# Patient Record
Sex: MALE | Race: WHITE | NOT HISPANIC OR LATINO | URBAN - METROPOLITAN AREA
[De-identification: names, ages, dates, MRNs, and addresses within clinical notes are randomized per-mention and may not be internally consistent; named-entity substitution may affect disease eponyms.]

---

## 2017-01-04 ENCOUNTER — EMERGENCY (EMERGENCY)
Facility: HOSPITAL | Age: 67
LOS: 0 days | Discharge: HOME | End: 2017-01-05

## 2017-06-27 DIAGNOSIS — R04.0 EPISTAXIS: ICD-10-CM

## 2017-06-27 DIAGNOSIS — Z79.82 LONG TERM (CURRENT) USE OF ASPIRIN: ICD-10-CM

## 2017-06-27 DIAGNOSIS — Z91.040 LATEX ALLERGY STATUS: ICD-10-CM

## 2017-06-27 DIAGNOSIS — I10 ESSENTIAL (PRIMARY) HYPERTENSION: ICD-10-CM

## 2018-09-06 ENCOUNTER — APPOINTMENT (OUTPATIENT)
Dept: UROLOGY | Facility: CLINIC | Age: 68
End: 2018-09-06
Payer: COMMERCIAL

## 2018-09-06 DIAGNOSIS — Z87.891 PERSONAL HISTORY OF NICOTINE DEPENDENCE: ICD-10-CM

## 2018-09-06 DIAGNOSIS — M10.9 GOUT, UNSPECIFIED: ICD-10-CM

## 2018-09-06 DIAGNOSIS — E78.5 HYPERLIPIDEMIA, UNSPECIFIED: ICD-10-CM

## 2018-09-06 DIAGNOSIS — Z82.3 FAMILY HISTORY OF STROKE: ICD-10-CM

## 2018-09-06 DIAGNOSIS — I10 ESSENTIAL (PRIMARY) HYPERTENSION: ICD-10-CM

## 2018-09-06 DIAGNOSIS — Z78.9 OTHER SPECIFIED HEALTH STATUS: ICD-10-CM

## 2018-09-06 DIAGNOSIS — Z85.528 PERSONAL HISTORY OF OTHER MALIGNANT NEOPLASM OF KIDNEY: ICD-10-CM

## 2018-09-06 LAB
BILIRUB UR QL STRIP: NORMAL
CLARITY UR: CLEAR
COLLECTION METHOD: NORMAL
GLUCOSE UR-MCNC: NORMAL
HCG UR QL: NORMAL EU/DL
HGB UR QL STRIP.AUTO: NORMAL
KETONES UR-MCNC: NORMAL
LEUKOCYTE ESTERASE UR QL STRIP: NORMAL
NITRITE UR QL STRIP: NORMAL
PH UR STRIP: 5
PROT UR STRIP-MCNC: NORMAL
SP GR UR STRIP: 1010

## 2018-09-06 PROCEDURE — 81003 URINALYSIS AUTO W/O SCOPE: CPT | Mod: QW

## 2018-09-06 PROCEDURE — 99204 OFFICE O/P NEW MOD 45 MIN: CPT

## 2018-09-08 ENCOUNTER — OUTPATIENT (OUTPATIENT)
Dept: OUTPATIENT SERVICES | Facility: HOSPITAL | Age: 68
LOS: 1 days | Discharge: HOME | End: 2018-09-08

## 2018-09-18 ENCOUNTER — OTHER (OUTPATIENT)
Age: 68
End: 2018-09-18

## 2018-10-25 ENCOUNTER — APPOINTMENT (OUTPATIENT)
Dept: UROLOGY | Facility: CLINIC | Age: 68
End: 2018-10-25
Payer: COMMERCIAL

## 2018-10-25 ENCOUNTER — RESULT CHARGE (OUTPATIENT)
Age: 68
End: 2018-10-25

## 2018-10-25 VITALS
WEIGHT: 230 LBS | HEART RATE: 92 BPM | HEIGHT: 68 IN | SYSTOLIC BLOOD PRESSURE: 124 MMHG | BODY MASS INDEX: 34.86 KG/M2 | DIASTOLIC BLOOD PRESSURE: 77 MMHG

## 2018-10-25 LAB
BILIRUB UR QL STRIP: NORMAL
CLARITY UR: CLEAR
COLLECTION METHOD: NORMAL
GLUCOSE UR-MCNC: NORMAL
HCG UR QL: NORMAL EU/DL
HGB UR QL STRIP.AUTO: NORMAL
KETONES UR-MCNC: NORMAL
LEUKOCYTE ESTERASE UR QL STRIP: NORMAL
NITRITE UR QL STRIP: NORMAL
PH UR STRIP: 5
PROT UR STRIP-MCNC: 30
SP GR UR STRIP: 1015

## 2018-10-25 PROCEDURE — 99213 OFFICE O/P EST LOW 20 MIN: CPT

## 2018-11-01 ENCOUNTER — TRANSCRIPTION ENCOUNTER (OUTPATIENT)
Age: 68
End: 2018-11-01

## 2018-11-08 DIAGNOSIS — R89.7 ABNORMAL HISTOLOGICAL FINDINGS IN SPECIMENS FROM OTHER ORGANS, SYSTEMS AND TISSUES: ICD-10-CM

## 2019-01-18 ENCOUNTER — APPOINTMENT (OUTPATIENT)
Dept: UROLOGY | Facility: CLINIC | Age: 69
End: 2019-01-18
Payer: COMMERCIAL

## 2019-01-18 PROCEDURE — 52000 CYSTOURETHROSCOPY: CPT | Mod: 22

## 2019-02-18 ENCOUNTER — FORM ENCOUNTER (OUTPATIENT)
Age: 69
End: 2019-02-18

## 2019-02-19 ENCOUNTER — OUTPATIENT (OUTPATIENT)
Dept: OUTPATIENT SERVICES | Facility: HOSPITAL | Age: 69
LOS: 1 days | Discharge: HOME | End: 2019-02-19

## 2019-02-19 VITALS
WEIGHT: 229.28 LBS | SYSTOLIC BLOOD PRESSURE: 141 MMHG | RESPIRATION RATE: 16 BRPM | HEIGHT: 68 IN | HEART RATE: 84 BPM | DIASTOLIC BLOOD PRESSURE: 72 MMHG | OXYGEN SATURATION: 97 % | TEMPERATURE: 97 F

## 2019-02-19 DIAGNOSIS — Z01.818 ENCOUNTER FOR OTHER PREPROCEDURAL EXAMINATION: ICD-10-CM

## 2019-02-19 DIAGNOSIS — Z98.890 OTHER SPECIFIED POSTPROCEDURAL STATES: Chronic | ICD-10-CM

## 2019-02-19 DIAGNOSIS — Z90.5 ACQUIRED ABSENCE OF KIDNEY: Chronic | ICD-10-CM

## 2019-02-19 DIAGNOSIS — D41.4 NEOPLASM OF UNCERTAIN BEHAVIOR OF BLADDER: ICD-10-CM

## 2019-02-19 DIAGNOSIS — D11.9 BENIGN NEOPLASM OF MAJOR SALIVARY GLAND, UNSPECIFIED: Chronic | ICD-10-CM

## 2019-02-19 LAB
ALBUMIN SERPL ELPH-MCNC: 4.8 G/DL — SIGNIFICANT CHANGE UP (ref 3.5–5.2)
ALP SERPL-CCNC: 66 U/L — SIGNIFICANT CHANGE UP (ref 30–115)
ALT FLD-CCNC: 25 U/L — SIGNIFICANT CHANGE UP (ref 0–41)
ANION GAP SERPL CALC-SCNC: 16 MMOL/L — HIGH (ref 7–14)
APPEARANCE UR: CLEAR — SIGNIFICANT CHANGE UP
APTT BLD: 35.3 SEC — SIGNIFICANT CHANGE UP (ref 27–39.2)
AST SERPL-CCNC: 23 U/L — SIGNIFICANT CHANGE UP (ref 0–41)
BASOPHILS # BLD AUTO: 0.02 K/UL — SIGNIFICANT CHANGE UP (ref 0–0.2)
BASOPHILS NFR BLD AUTO: 0.4 % — SIGNIFICANT CHANGE UP (ref 0–1)
BILIRUB SERPL-MCNC: 0.8 MG/DL — SIGNIFICANT CHANGE UP (ref 0.2–1.2)
BILIRUB UR-MCNC: NEGATIVE — SIGNIFICANT CHANGE UP
BUN SERPL-MCNC: 21 MG/DL — HIGH (ref 10–20)
CALCIUM SERPL-MCNC: 10.5 MG/DL — HIGH (ref 8.5–10.1)
CHLORIDE SERPL-SCNC: 97 MMOL/L — LOW (ref 98–110)
CO2 SERPL-SCNC: 27 MMOL/L — SIGNIFICANT CHANGE UP (ref 17–32)
COLOR SPEC: YELLOW — SIGNIFICANT CHANGE UP
CREAT SERPL-MCNC: 0.9 MG/DL — SIGNIFICANT CHANGE UP (ref 0.7–1.5)
DIFF PNL FLD: NEGATIVE — SIGNIFICANT CHANGE UP
EOSINOPHIL # BLD AUTO: 0.08 K/UL — SIGNIFICANT CHANGE UP (ref 0–0.7)
EOSINOPHIL NFR BLD AUTO: 1.5 % — SIGNIFICANT CHANGE UP (ref 0–8)
GLUCOSE SERPL-MCNC: 72 MG/DL — SIGNIFICANT CHANGE UP (ref 70–99)
GLUCOSE UR QL: NEGATIVE MG/DL — SIGNIFICANT CHANGE UP
HCT VFR BLD CALC: 48.3 % — SIGNIFICANT CHANGE UP (ref 42–52)
HGB BLD-MCNC: 16 G/DL — SIGNIFICANT CHANGE UP (ref 14–18)
IMM GRANULOCYTES NFR BLD AUTO: 0.2 % — SIGNIFICANT CHANGE UP (ref 0.1–0.3)
INR BLD: 0.95 RATIO — SIGNIFICANT CHANGE UP (ref 0.65–1.3)
KETONES UR-MCNC: NEGATIVE — SIGNIFICANT CHANGE UP
LEUKOCYTE ESTERASE UR-ACNC: NEGATIVE — SIGNIFICANT CHANGE UP
LYMPHOCYTES # BLD AUTO: 1.08 K/UL — LOW (ref 1.2–3.4)
LYMPHOCYTES # BLD AUTO: 19.6 % — LOW (ref 20.5–51.1)
MCHC RBC-ENTMCNC: 31.3 PG — HIGH (ref 27–31)
MCHC RBC-ENTMCNC: 33.1 G/DL — SIGNIFICANT CHANGE UP (ref 32–37)
MCV RBC AUTO: 94.3 FL — HIGH (ref 80–94)
MONOCYTES # BLD AUTO: 0.58 K/UL — SIGNIFICANT CHANGE UP (ref 0.1–0.6)
MONOCYTES NFR BLD AUTO: 10.5 % — HIGH (ref 1.7–9.3)
NEUTROPHILS # BLD AUTO: 3.74 K/UL — SIGNIFICANT CHANGE UP (ref 1.4–6.5)
NEUTROPHILS NFR BLD AUTO: 67.8 % — SIGNIFICANT CHANGE UP (ref 42.2–75.2)
NITRITE UR-MCNC: NEGATIVE — SIGNIFICANT CHANGE UP
NRBC # BLD: 0 /100 WBCS — SIGNIFICANT CHANGE UP (ref 0–0)
PH UR: 5.5 — SIGNIFICANT CHANGE UP (ref 5–8)
PLATELET # BLD AUTO: 242 K/UL — SIGNIFICANT CHANGE UP (ref 130–400)
POTASSIUM SERPL-MCNC: 5 MMOL/L — SIGNIFICANT CHANGE UP (ref 3.5–5)
POTASSIUM SERPL-SCNC: 5 MMOL/L — SIGNIFICANT CHANGE UP (ref 3.5–5)
PROT SERPL-MCNC: 7.7 G/DL — SIGNIFICANT CHANGE UP (ref 6–8)
PROT UR-MCNC: 100 MG/DL
PROTHROM AB SERPL-ACNC: 10.9 SEC — SIGNIFICANT CHANGE UP (ref 9.95–12.87)
RBC # BLD: 5.12 M/UL — SIGNIFICANT CHANGE UP (ref 4.7–6.1)
RBC # FLD: 14.7 % — HIGH (ref 11.5–14.5)
SODIUM SERPL-SCNC: 140 MMOL/L — SIGNIFICANT CHANGE UP (ref 135–146)
SP GR SPEC: 1.02 — SIGNIFICANT CHANGE UP (ref 1.01–1.03)
UROBILINOGEN FLD QL: 0.2 MG/DL — SIGNIFICANT CHANGE UP (ref 0.2–0.2)
WBC # BLD: 5.51 K/UL — SIGNIFICANT CHANGE UP (ref 4.8–10.8)
WBC # FLD AUTO: 5.51 K/UL — SIGNIFICANT CHANGE UP (ref 4.8–10.8)
WBC UR QL: SIGNIFICANT CHANGE UP /HPF

## 2019-02-19 NOTE — H&P PST ADULT - REASON FOR ADMISSION
69 yo male presents with hx "malignant tumor in my left kidney& I had part of it removed, I go for routine f/u& I have polyps in my bladder", pt is scheduled fo biopsy, turbt  denies chest pain, palpitations, shortness of breath, dyspnea, or dysuria. exercise tolerance: 2 blocks/ flights of stairs w/o sob

## 2019-02-19 NOTE — H&P PST ADULT - PMH
Back pain    BPH (benign prostatic hyperplasia)    GERD (gastroesophageal reflux disease)    Gout    HTN (hypertension)    Renal cancer, left

## 2019-02-20 LAB
CULTURE RESULTS: NO GROWTH — SIGNIFICANT CHANGE UP
SPECIMEN SOURCE: SIGNIFICANT CHANGE UP

## 2019-03-01 ENCOUNTER — TRANSCRIPTION ENCOUNTER (OUTPATIENT)
Age: 69
End: 2019-03-01

## 2019-03-04 ENCOUNTER — RESULT REVIEW (OUTPATIENT)
Age: 69
End: 2019-03-04

## 2019-03-04 ENCOUNTER — APPOINTMENT (OUTPATIENT)
Dept: UROLOGY | Facility: HOSPITAL | Age: 69
End: 2019-03-04
Payer: COMMERCIAL

## 2019-03-04 ENCOUNTER — OUTPATIENT (OUTPATIENT)
Dept: OUTPATIENT SERVICES | Facility: HOSPITAL | Age: 69
LOS: 1 days | Discharge: HOME | End: 2019-03-04

## 2019-03-04 VITALS
HEART RATE: 105 BPM | HEIGHT: 68 IN | OXYGEN SATURATION: 100 % | RESPIRATION RATE: 16 BRPM | DIASTOLIC BLOOD PRESSURE: 84 MMHG | SYSTOLIC BLOOD PRESSURE: 151 MMHG | WEIGHT: 225.09 LBS | TEMPERATURE: 98 F

## 2019-03-04 VITALS — DIASTOLIC BLOOD PRESSURE: 60 MMHG | RESPIRATION RATE: 18 BRPM | SYSTOLIC BLOOD PRESSURE: 110 MMHG | HEART RATE: 90 BPM

## 2019-03-04 DIAGNOSIS — Z90.5 ACQUIRED ABSENCE OF KIDNEY: Chronic | ICD-10-CM

## 2019-03-04 DIAGNOSIS — Z98.890 OTHER SPECIFIED POSTPROCEDURAL STATES: Chronic | ICD-10-CM

## 2019-03-04 DIAGNOSIS — D11.9 BENIGN NEOPLASM OF MAJOR SALIVARY GLAND, UNSPECIFIED: Chronic | ICD-10-CM

## 2019-03-04 PROBLEM — K21.9 GASTRO-ESOPHAGEAL REFLUX DISEASE WITHOUT ESOPHAGITIS: Chronic | Status: ACTIVE | Noted: 2019-02-19

## 2019-03-04 PROBLEM — I10 ESSENTIAL (PRIMARY) HYPERTENSION: Chronic | Status: ACTIVE | Noted: 2019-02-19

## 2019-03-04 PROBLEM — M54.9 DORSALGIA, UNSPECIFIED: Chronic | Status: ACTIVE | Noted: 2019-02-19

## 2019-03-04 PROBLEM — M10.9 GOUT, UNSPECIFIED: Chronic | Status: ACTIVE | Noted: 2019-02-19

## 2019-03-04 PROCEDURE — 52234 CYSTOSCOPY AND TREATMENT: CPT

## 2019-03-04 PROCEDURE — 52630 REMOVE PROSTATE REGROWTH: CPT

## 2019-03-04 RX ORDER — ACETAMINOPHEN 500 MG
650 TABLET ORAL ONCE
Qty: 0 | Refills: 0 | Status: DISCONTINUED | OUTPATIENT
Start: 2019-03-04 | End: 2019-03-19

## 2019-03-04 RX ORDER — HYDROMORPHONE HYDROCHLORIDE 2 MG/ML
0.5 INJECTION INTRAMUSCULAR; INTRAVENOUS; SUBCUTANEOUS
Qty: 0 | Refills: 0 | Status: DISCONTINUED | OUTPATIENT
Start: 2019-03-04 | End: 2019-03-04

## 2019-03-04 RX ORDER — KETOROLAC TROMETHAMINE 30 MG/ML
30 SYRINGE (ML) INJECTION ONCE
Qty: 0 | Refills: 0 | Status: DISCONTINUED | OUTPATIENT
Start: 2019-03-04 | End: 2019-03-19

## 2019-03-04 RX ORDER — ONDANSETRON 8 MG/1
4 TABLET, FILM COATED ORAL ONCE
Qty: 0 | Refills: 0 | Status: DISCONTINUED | OUTPATIENT
Start: 2019-03-04 | End: 2019-03-19

## 2019-03-04 RX ORDER — SODIUM CHLORIDE 9 MG/ML
1000 INJECTION, SOLUTION INTRAVENOUS
Qty: 0 | Refills: 0 | Status: DISCONTINUED | OUTPATIENT
Start: 2019-03-04 | End: 2019-03-19

## 2019-03-04 RX ORDER — PHENAZOPYRIDINE HCL 100 MG
200 TABLET ORAL ONCE
Qty: 0 | Refills: 0 | Status: DISCONTINUED | OUTPATIENT
Start: 2019-03-04 | End: 2019-03-19

## 2019-03-04 RX ORDER — OXYCODONE AND ACETAMINOPHEN 5; 325 MG/1; MG/1
1 TABLET ORAL ONCE
Qty: 0 | Refills: 0 | Status: DISCONTINUED | OUTPATIENT
Start: 2019-03-04 | End: 2019-03-04

## 2019-03-04 NOTE — ASU DISCHARGE PLAN (ADULT/PEDIATRIC). - SPECIAL INSTRUCTIONS
expect some blood in urine   expect some pain   cath to leg bag   finish ABT   call now for appt tomorrow to remove cath

## 2019-03-04 NOTE — BRIEF OPERATIVE NOTE - PROCEDURE
<<-----Click on this checkbox to enter Procedure Cystoscopy  03/04/2019    Active  CHET  TURBT, using bipolar cautery  03/04/2019    Active  CHET  TURP, using bipolar cautery probe  03/04/2019    Active  CHET  Prostate biopsy, transurethral  03/04/2019  cold cup  Active  CHET

## 2019-03-05 ENCOUNTER — APPOINTMENT (OUTPATIENT)
Dept: UROLOGY | Facility: CLINIC | Age: 69
End: 2019-03-05
Payer: COMMERCIAL

## 2019-03-05 DIAGNOSIS — R31.9 HEMATURIA, UNSPECIFIED: ICD-10-CM

## 2019-03-05 LAB — SURGICAL PATHOLOGY STUDY: SIGNIFICANT CHANGE UP

## 2019-03-05 PROCEDURE — 99213 OFFICE O/P EST LOW 20 MIN: CPT

## 2019-03-05 NOTE — HISTORY OF PRESENT ILLNESS
[Urinary Retention] : urinary retention [FreeTextEntry1] : 68 y.o male s/p TURBT/TURP/TU prostate biopsy yesterday 3/4/19\par here for catheter removal\par doing well\par no complaints\par appetite good\par denies fevers

## 2019-03-05 NOTE — PHYSICAL EXAM
[General Appearance - Well Developed] : well developed [General Appearance - Well Nourished] : well nourished [Normal Appearance] : normal appearance [Well Groomed] : well groomed [General Appearance - In No Acute Distress] : no acute distress [Abdomen Soft] : soft [Abdomen Tenderness] : non-tender [Costovertebral Angle Tenderness] : no ~M costovertebral angle tenderness [Urethral Meatus] : meatus normal [Scrotum] : the scrotum was normal [Testes Mass (___cm)] : there were no testicular masses [Edema] : no peripheral edema [] : no respiratory distress [Respiration, Rhythm And Depth] : normal respiratory rhythm and effort [Exaggerated Use Of Accessory Muscles For Inspiration] : no accessory muscle use [Oriented To Time, Place, And Person] : oriented to person, place, and time [Affect] : the affect was normal [Mood] : the mood was normal [Not Anxious] : not anxious [Normal Station and Gait] : the gait and station were normal for the patient's age [No Focal Deficits] : no focal deficits [No Palpable Adenopathy] : no palpable adenopathy [FreeTextEntry1] : landon catheter to leg nag with clear urine removed without difficulty- pt tolerated well

## 2019-03-05 NOTE — ASSESSMENT
[FreeTextEntry1] : 1. s/p TURBT/TURP/TU prostate biopsy yesterday 3/4/19\par \par increase liquid intake- if no u/o in 6-8 hrs call office or go to the ER\par f/u 7-10 days for pathology discussion

## 2019-03-08 DIAGNOSIS — M10.9 GOUT, UNSPECIFIED: ICD-10-CM

## 2019-03-08 DIAGNOSIS — N40.0 BENIGN PROSTATIC HYPERPLASIA WITHOUT LOWER URINARY TRACT SYMPTOMS: ICD-10-CM

## 2019-03-08 DIAGNOSIS — M54.9 DORSALGIA, UNSPECIFIED: ICD-10-CM

## 2019-03-08 DIAGNOSIS — Z87.891 PERSONAL HISTORY OF NICOTINE DEPENDENCE: ICD-10-CM

## 2019-03-08 DIAGNOSIS — N34.2 OTHER URETHRITIS: ICD-10-CM

## 2019-03-08 DIAGNOSIS — I10 ESSENTIAL (PRIMARY) HYPERTENSION: ICD-10-CM

## 2019-03-08 DIAGNOSIS — Z85.528 PERSONAL HISTORY OF OTHER MALIGNANT NEOPLASM OF KIDNEY: ICD-10-CM

## 2019-03-08 DIAGNOSIS — Z90.5 ACQUIRED ABSENCE OF KIDNEY: ICD-10-CM

## 2019-03-08 DIAGNOSIS — K21.9 GASTRO-ESOPHAGEAL REFLUX DISEASE WITHOUT ESOPHAGITIS: ICD-10-CM

## 2019-03-08 DIAGNOSIS — D41.4 NEOPLASM OF UNCERTAIN BEHAVIOR OF BLADDER: ICD-10-CM

## 2019-03-20 ENCOUNTER — APPOINTMENT (OUTPATIENT)
Dept: UROLOGY | Facility: CLINIC | Age: 69
End: 2019-03-20
Payer: COMMERCIAL

## 2019-03-20 VITALS — WEIGHT: 230 LBS | HEIGHT: 68 IN | BODY MASS INDEX: 34.86 KG/M2

## 2019-03-20 VITALS — DIASTOLIC BLOOD PRESSURE: 74 MMHG | SYSTOLIC BLOOD PRESSURE: 137 MMHG | HEART RATE: 90 BPM

## 2019-03-20 PROCEDURE — 99213 OFFICE O/P EST LOW 20 MIN: CPT | Mod: 24

## 2019-04-09 ENCOUNTER — APPOINTMENT (OUTPATIENT)
Dept: UROLOGY | Facility: CLINIC | Age: 69
End: 2019-04-09

## 2020-03-24 ENCOUNTER — APPOINTMENT (OUTPATIENT)
Dept: UROLOGY | Facility: CLINIC | Age: 70
End: 2020-03-24

## 2020-07-13 ENCOUNTER — INPATIENT (INPATIENT)
Facility: HOSPITAL | Age: 70
LOS: 7 days | Discharge: HOME HEALTH | End: 2020-07-21
Attending: THORACIC SURGERY (CARDIOTHORACIC VASCULAR SURGERY) | Admitting: THORACIC SURGERY (CARDIOTHORACIC VASCULAR SURGERY)
Payer: MEDICARE

## 2020-07-13 VITALS
TEMPERATURE: 99 F | RESPIRATION RATE: 18 BRPM | WEIGHT: 240.97 LBS | OXYGEN SATURATION: 96 % | DIASTOLIC BLOOD PRESSURE: 94 MMHG | SYSTOLIC BLOOD PRESSURE: 149 MMHG | HEART RATE: 118 BPM

## 2020-07-13 DIAGNOSIS — Z98.890 OTHER SPECIFIED POSTPROCEDURAL STATES: Chronic | ICD-10-CM

## 2020-07-13 DIAGNOSIS — D11.9 BENIGN NEOPLASM OF MAJOR SALIVARY GLAND, UNSPECIFIED: Chronic | ICD-10-CM

## 2020-07-13 DIAGNOSIS — Z90.5 ACQUIRED ABSENCE OF KIDNEY: Chronic | ICD-10-CM

## 2020-07-13 DIAGNOSIS — D41.4 NEOPLASM OF UNCERTAIN BEHAVIOR OF BLADDER: Chronic | ICD-10-CM

## 2020-07-13 DIAGNOSIS — Z98.1 ARTHRODESIS STATUS: Chronic | ICD-10-CM

## 2020-07-13 LAB
ALBUMIN SERPL ELPH-MCNC: 4.4 G/DL — SIGNIFICANT CHANGE UP (ref 3.5–5.2)
ALP SERPL-CCNC: 60 U/L — SIGNIFICANT CHANGE UP (ref 30–115)
ALT FLD-CCNC: 24 U/L — SIGNIFICANT CHANGE UP (ref 0–41)
ANION GAP SERPL CALC-SCNC: 13 MMOL/L — SIGNIFICANT CHANGE UP (ref 7–14)
AST SERPL-CCNC: 22 U/L — SIGNIFICANT CHANGE UP (ref 0–41)
BILIRUB SERPL-MCNC: 0.6 MG/DL — SIGNIFICANT CHANGE UP (ref 0.2–1.2)
BUN SERPL-MCNC: 22 MG/DL — HIGH (ref 10–20)
CALCIUM SERPL-MCNC: 10.2 MG/DL — HIGH (ref 8.5–10.1)
CHLORIDE SERPL-SCNC: 102 MMOL/L — SIGNIFICANT CHANGE UP (ref 98–110)
CO2 SERPL-SCNC: 23 MMOL/L — SIGNIFICANT CHANGE UP (ref 17–32)
CREAT SERPL-MCNC: 0.9 MG/DL — SIGNIFICANT CHANGE UP (ref 0.7–1.5)
GLUCOSE SERPL-MCNC: 146 MG/DL — HIGH (ref 70–99)
HCT VFR BLD CALC: 48.2 % — SIGNIFICANT CHANGE UP (ref 42–52)
HGB BLD-MCNC: 15.7 G/DL — SIGNIFICANT CHANGE UP (ref 14–18)
INR BLD: 0.93 RATIO — SIGNIFICANT CHANGE UP (ref 0.65–1.3)
MCHC RBC-ENTMCNC: 31 PG — SIGNIFICANT CHANGE UP (ref 27–31)
MCHC RBC-ENTMCNC: 32.6 G/DL — SIGNIFICANT CHANGE UP (ref 32–37)
MCV RBC AUTO: 95.3 FL — HIGH (ref 80–94)
NRBC # BLD: 0 /100 WBCS — SIGNIFICANT CHANGE UP (ref 0–0)
NT-PROBNP SERPL-SCNC: 34 PG/ML — SIGNIFICANT CHANGE UP (ref 0–300)
PLATELET # BLD AUTO: 195 K/UL — SIGNIFICANT CHANGE UP (ref 130–400)
POTASSIUM SERPL-MCNC: 4.3 MMOL/L — SIGNIFICANT CHANGE UP (ref 3.5–5)
POTASSIUM SERPL-SCNC: 4.3 MMOL/L — SIGNIFICANT CHANGE UP (ref 3.5–5)
PROT SERPL-MCNC: 7.4 G/DL — SIGNIFICANT CHANGE UP (ref 6–8)
PROTHROM AB SERPL-ACNC: 10.7 SEC — SIGNIFICANT CHANGE UP (ref 9.95–12.87)
RBC # BLD: 5.06 M/UL — SIGNIFICANT CHANGE UP (ref 4.7–6.1)
RBC # FLD: 14.5 % — SIGNIFICANT CHANGE UP (ref 11.5–14.5)
SODIUM SERPL-SCNC: 138 MMOL/L — SIGNIFICANT CHANGE UP (ref 135–146)
TROPONIN T SERPL-MCNC: <0.01 NG/ML — SIGNIFICANT CHANGE UP
WBC # BLD: 8.71 K/UL — SIGNIFICANT CHANGE UP (ref 4.8–10.8)
WBC # FLD AUTO: 8.71 K/UL — SIGNIFICANT CHANGE UP (ref 4.8–10.8)

## 2020-07-13 PROCEDURE — 93010 ELECTROCARDIOGRAM REPORT: CPT | Mod: 76

## 2020-07-13 PROCEDURE — 99285 EMERGENCY DEPT VISIT HI MDM: CPT | Mod: CS

## 2020-07-13 PROCEDURE — 71046 X-RAY EXAM CHEST 2 VIEWS: CPT | Mod: 26

## 2020-07-13 RX ORDER — ASPIRIN/CALCIUM CARB/MAGNESIUM 324 MG
162 TABLET ORAL ONCE
Refills: 0 | Status: COMPLETED | OUTPATIENT
Start: 2020-07-13 | End: 2020-07-13

## 2020-07-13 RX ORDER — SODIUM CHLORIDE 9 MG/ML
350 INJECTION INTRAMUSCULAR; INTRAVENOUS; SUBCUTANEOUS ONCE
Refills: 0 | Status: DISCONTINUED | OUTPATIENT
Start: 2020-07-14 | End: 2020-07-14

## 2020-07-13 RX ORDER — OMEGA-3 ACID ETHYL ESTERS 1 G
2 CAPSULE ORAL
Refills: 0 | Status: DISCONTINUED | OUTPATIENT
Start: 2020-07-13 | End: 2020-07-14

## 2020-07-13 RX ORDER — SENNA PLUS 8.6 MG/1
2 TABLET ORAL AT BEDTIME
Refills: 0 | Status: DISCONTINUED | OUTPATIENT
Start: 2020-07-13 | End: 2020-07-15

## 2020-07-13 RX ORDER — PANTOPRAZOLE SODIUM 20 MG/1
40 TABLET, DELAYED RELEASE ORAL
Refills: 0 | Status: DISCONTINUED | OUTPATIENT
Start: 2020-07-13 | End: 2020-07-15

## 2020-07-13 RX ORDER — ALLOPURINOL 300 MG
300 TABLET ORAL DAILY
Refills: 0 | Status: DISCONTINUED | OUTPATIENT
Start: 2020-07-13 | End: 2020-07-15

## 2020-07-13 RX ORDER — ENOXAPARIN SODIUM 100 MG/ML
40 INJECTION SUBCUTANEOUS AT BEDTIME
Refills: 0 | Status: DISCONTINUED | OUTPATIENT
Start: 2020-07-13 | End: 2020-07-14

## 2020-07-13 RX ORDER — HYDROCHLOROTHIAZIDE 25 MG
12.5 TABLET ORAL DAILY
Refills: 0 | Status: DISCONTINUED | OUTPATIENT
Start: 2020-07-13 | End: 2020-07-14

## 2020-07-13 RX ORDER — ASPIRIN/CALCIUM CARB/MAGNESIUM 324 MG
1 TABLET ORAL
Qty: 0 | Refills: 0 | DISCHARGE

## 2020-07-13 RX ORDER — OMEPRAZOLE 10 MG/1
1 CAPSULE, DELAYED RELEASE ORAL
Qty: 0 | Refills: 0 | DISCHARGE

## 2020-07-13 RX ORDER — LOSARTAN POTASSIUM 100 MG/1
50 TABLET, FILM COATED ORAL DAILY
Refills: 0 | Status: DISCONTINUED | OUTPATIENT
Start: 2020-07-13 | End: 2020-07-14

## 2020-07-13 RX ORDER — SODIUM CHLORIDE 9 MG/ML
500 INJECTION, SOLUTION INTRAVENOUS
Refills: 0 | Status: DISCONTINUED | OUTPATIENT
Start: 2020-07-14 | End: 2020-07-14

## 2020-07-13 RX ORDER — ACETAMINOPHEN 500 MG
2 TABLET ORAL
Qty: 0 | Refills: 0 | DISCHARGE

## 2020-07-13 RX ORDER — ASPIRIN/CALCIUM CARB/MAGNESIUM 324 MG
81 TABLET ORAL DAILY
Refills: 0 | Status: DISCONTINUED | OUTPATIENT
Start: 2020-07-13 | End: 2020-07-15

## 2020-07-13 RX ORDER — CHLORHEXIDINE GLUCONATE 213 G/1000ML
1 SOLUTION TOPICAL
Refills: 0 | Status: DISCONTINUED | OUTPATIENT
Start: 2020-07-13 | End: 2020-07-15

## 2020-07-13 RX ADMIN — Medication 162 MILLIGRAM(S): at 11:32

## 2020-07-13 NOTE — ED ADULT TRIAGE NOTE - CHIEF COMPLAINT QUOTE
Patient c/o left sided CP that started this morning after eating and radiates down left arm. Denies SOB and states pain has subsided

## 2020-07-13 NOTE — H&P ADULT - NSICDXPASTMEDICALHX_GEN_ALL_CORE_FT
PAST MEDICAL HISTORY:  Back pain     BPH (benign prostatic hyperplasia)     GERD (gastroesophageal reflux disease)     Gout     HTN (hypertension)     Renal cancer, left Renal cell carcinoma s/p partial left nephrectomy (20% removed)

## 2020-07-13 NOTE — H&P ADULT - NSHPPHYSICALEXAM_GEN_ALL_CORE
CONSTITUTIONAL: No acute distress, obese, well-groomed, AAOx3  HEAD: Atraumatic, normocephalic  EYES: EOM intact, PERRLA, conjunctiva and sclera clear  ENT: Supple, no masses, no thyromegaly, no bruits, no JVD; moist mucous membranes  PULMONARY: Clear to auscultation bilaterally; no wheezes, rales, or rhonchi  CARDIOVASCULAR: Sinus tachycardia; no murmurs, rubs, or gallops  GASTROINTESTINAL: Soft, non-tender, distended; bowel sounds present  MUSCULOSKELETAL: 2+ peripheral pulses; no clubbing, no cyanosis, no edema  NEUROLOGY: non-focal  SKIN: No rashes or lesions; warm and dry

## 2020-07-13 NOTE — H&P ADULT - NSHPLABSRESULTS_GEN_ALL_CORE
15.7   8.71  )-----------( 195      ( 13 Jul 2020 11:20 )             48.2     07-13    138  |  102  |  22<H>  ----------------------------<  146<H>  4.3   |  23  |  0.9    Ca    10.2<H>      13 Jul 2020 11:20    TPro  7.4  /  Alb  4.4  /  TBili  0.6  /  DBili  x   /  AST  22  /  ALT  24  /  AlkPhos  60  07-13    PT/INR - ( 13 Jul 2020 11:42 )   PT: 10.70 sec;   INR: 0.93 ratio         Troponin T, Serum: <0.01 ng/mL (07-13-20 @ 11:20)    CARDIAC MARKERS ( 13 Jul 2020 11:20 )  x     / <0.01 ng/mL / x     / x     / x        < from: 12 Lead ECG (07.13.20 @ 11:37) >    Diagnosis Line Sinus tachycardia  Low voltage QRS  Left anterior fascicular block  Inferior infarct , age undetermined  Possible Anterolateral infarct , age undetermined  Abnormal ECG  < end of copied text >

## 2020-07-13 NOTE — ED PROVIDER NOTE - NS ED MD TWO NIGHTS YN
Yes Consent 2/Introductory Paragraph: Mohs surgery was explained to the patient and consent was obtained. The risks, benefits and alternatives to therapy were discussed in detail. Specifically, the risks of infection, scarring, bleeding, prolonged wound healing, incomplete removal, allergy to anesthesia, nerve injury and recurrence were addressed. Prior to the procedure, the treatment site was clearly identified and confirmed by the patient using mirror when possible. All components of Universal Protocol/PAUSE Rule completed.

## 2020-07-13 NOTE — H&P ADULT - NSHPOUTPATIENTPROVIDERS_GEN_ALL_CORE
PCP: Dr. Vinita Farrell  Cardiologist: Dr. Sree Fernandes  Rheumatologist: Dr. Constantino Diamond  Nephrologist: Dr. Sylvia Arce

## 2020-07-13 NOTE — ED ADULT NURSE NOTE - OBJECTIVE STATEMENT
Pt states after eating breakfast he had left back/shoulder pain that radiated down his arm associated with left side chest tightness and mild dizziness.

## 2020-07-13 NOTE — CHART NOTE - NSCHARTNOTEFT_GEN_A_CORE
Pre cath note:    indication:  [ ] STEMI                [ ] NSTEMI                 [ ] Acute coronary syndrome                     [ ]Unstable Angina   [ ] high risk  [ ] intermediate risk  [ ] low risk                     [ ] Stable Angina     non-invasive testing:  abdnormal stress test                        Date:                     result: [ ] high risk  [ ] intermediate risk  [ ] low risk    Anti- Anginal medications:                    [x] not used                       [ ] used                   [ ] not used but strong indication not to use    Ejection Fraction                   [ ] <29            [ ] 30-39%   [ ] 40-49%     [ ]>50%    CHF                   [ ] active (within last 14 days on meds   [ ] Chronic (on meds but no exacerbation)    COPD                   [ ] mild (on chronic bronchodilators)  [ ] moderate (on chronic steroid therapy)      [ ] severe (indication for home O2 or PACO2 >50)    Other risk factors:                       [ ] Previous MI                     [ ] CVA/ stroke                    [ ] carotid stent/ CEA                    [ ] PVD/PAD- (arterial aneurysm, non-palpable pulses, tortuous vessel with inability to insert catheter, infra-renal dissection, renal or subclavian artery stenosis)                    [ ] diabetic                    [ ] previous CABG                    [ ] Renal Failure                           15.7   8.71  )-----------( 195      ( 13 Jul 2020 11:20 )             48.2     07-13    138  |  102  |  22<H>  ----------------------------<  146<H>  4.3   |  23  |  0.9    Ca    10.2<H>      13 Jul 2020 11:20    TPro  7.4  /  Alb  4.4  /  TBili  0.6  /  DBili  x   /  AST  22  /  ALT  24  /  AlkPhos  60  07-13                         -Patient Schedule for LHC/PCI tomorrow at :   -Keep NPO after midnight  -Hold Anticoagulation prior to PCI: Hold Lovenox for DVT prophylaxis on day of cardiac cath  -Start IV Fluids NS at : patient already on IV fluids Pre cath note:    indication:  [ ] STEMI                [ ] NSTEMI                 [ ] Acute coronary syndrome                     [ ]Unstable Angina   [ ] high risk  [ ] intermediate risk  [ ] low risk                     [x] Stable Angina     non-invasive testing:  abnormal stress test                        Date:                     result: [ ] high risk  [ ] intermediate risk  [ ] low risk    Anti- Anginal medications:                    [x] not used                       [ ] used                   [ ] not used but strong indication not to use    Ejection Fraction                   [ ] <29            [ ] 30-39%   [ ] 40-49%     [ ]>50%    CHF                   [ ] active (within last 14 days on meds   [ ] Chronic (on meds but no exacerbation)    COPD                   [ ] mild (on chronic bronchodilators)  [ ] moderate (on chronic steroid therapy)      [ ] severe (indication for home O2 or PACO2 >50)    Other risk factors:                       [ ] Previous MI                     [ ] CVA/ stroke                    [ ] carotid stent/ CEA                    [ ] PVD/PAD- (arterial aneurysm, non-palpable pulses, tortuous vessel with inability to insert catheter, infra-renal dissection, renal or subclavian artery stenosis)                    [ ] diabetic                    [ ] previous CABG                    [ ] Renal Failure                           15.7   8.71  )-----------( 195      ( 13 Jul 2020 11:20 )             48.2     07-13    138  |  102  |  22<H>  ----------------------------<  146<H>  4.3   |  23  |  0.9    Ca    10.2<H>      13 Jul 2020 11:20    TPro  7.4  /  Alb  4.4  /  TBili  0.6  /  DBili  x   /  AST  22  /  ALT  24  /  AlkPhos  60  07-13                         -Patient Schedule for LHC/PCI tomorrow at :   -Keep NPO after midnight  -Hold Anticoagulation prior to PCI: Hold Lovenox for DVT prophylaxis on day of cardiac cath  -Start IV Fluids NS at : 350cc to give 1 hour prior to cardiac cath Pre cath note:    indication:  [ ] STEMI                [ ] NSTEMI                 [ ] Acute coronary syndrome                     [ ]Unstable Angina   [ ] high risk  [ ] intermediate risk  [ ] low risk                     [x] Stable Angina     non-invasive testing:  abnormal stress test                        Date:                     result: [ ] high risk  [ ] intermediate risk  [ ] low risk    Anti- Anginal medications:                    [x] not used                       [ ] used                   [ ] not used but strong indication not to use    Ejection Fraction                   [ ] <29            [ ] 30-39%   [ ] 40-49%     [ ]>50%    CHF                   [ ] active (within last 14 days on meds   [ ] Chronic (on meds but no exacerbation)    COPD                   [ ] mild (on chronic bronchodilators)  [ ] moderate (on chronic steroid therapy)      [ ] severe (indication for home O2 or PACO2 >50)    Other risk factors:                       [ ] Previous MI                     [ ] CVA/ stroke                    [ ] carotid stent/ CEA                    [ ] PVD/PAD- (arterial aneurysm, non-palpable pulses, tortuous vessel with inability to insert catheter, infra-renal dissection, renal or subclavian artery stenosis)                    [ ] diabetic                    [ ] previous CABG                    [ ] Renal Failure                           15.7   8.71  )-----------( 195      ( 13 Jul 2020 11:20 )             48.2     07-13    138  |  102  |  22<H>  ----------------------------<  146<H>  4.3   |  23  |  0.9    Ca    10.2<H>      13 Jul 2020 11:20    TPro  7.4  /  Alb  4.4  /  TBili  0.6  /  DBili  x   /  AST  22  /  ALT  24  /  AlkPhos  60  07-13                         -Patient Schedule for LHC/PCI tomorrow at :   -Keep NPO after midnight  -Hold Anticoagulation prior to PCI: Hold Lovenox for DVT prophylaxis on day of cardiac cath  -Start IV Fluids NS at : 350cc to give 1 hour prior to cardiac cath    agree

## 2020-07-13 NOTE — H&P ADULT - NSHPREVIEWOFSYSTEMS_GEN_ALL_CORE
CONSTITUTIONAL: No weakness, fevers or chills; No headaches  EYES: No visual changes, eye pain, or discharge  ENT: No vertigo; No ear pain or change in hearing; No sore throat or difficulty swallowing  NECK: No pain or stiffness  RESPIRATORY: No cough, wheezing, or hemoptysis; No shortness of breath  CARDIOVASCULAR: (+) chest pain, no palpitations  GASTROINTESTINAL: No abdominal or epigastric pain; (+) nausea (now resolved), no vomiting or hematemesis; No diarrhea or constipation; No melena or hematochezia  GENITOURINARY: No dysuria, frequency or hematuria  MUSCULOSKELETAL: No joint pain, no muscle pain, no weakness  NEUROLOGICAL: No numbness or weakness  SKIN: No itching or rashes

## 2020-07-13 NOTE — ED PROVIDER NOTE - CLINICAL SUMMARY MEDICAL DECISION MAKING FREE TEXT BOX
evaluated for chest pain, symptoms are concerning for possible unstable angina. we spoke to cardiology and a plan is set for catherization for further evaluation, lab work and cxr were obtained with lab work showing no signs of NSTEMI.
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
## 2020-07-13 NOTE — ED PROVIDER NOTE - NS ED ROS FT
CONST: No fever, chills or bodyaches  EYES: No pain, redness, drainage or visual changes.  ENT: No ear pain or discharge, nasal discharge or congestion. No sore throat  CARD: see HPI  RESP: No SOB, cough  GI: No abdominal pain, N/V/D  MS: No joint pain, back pain or extremity pain/injury  SKIN: No rashes  NEURO: No headache, dizziness, paresthesias or LOC

## 2020-07-13 NOTE — ED PROVIDER NOTE - PROGRESS NOTE DETAILS
Attending Note:   70 yo M with plan for catheterization next month for heart due to recurrent CP, today he has been having CP described as chest tightness. L chest into L shoulder and arm without any associated sx. Now sx have resolved. On exam: PT in NAD. Cardiac- S1S2. Lungs- CTAB. Abdomen soft NTND. Extremities- No LE edema. Neuro- grossly intact. Plan: Cardiac workup with troponin, EKG, CXR and cardio consult Discussed with Dr. Gomez, pts cardiologist, will discuss with Dr. Woodruff for potential cath earlier

## 2020-07-13 NOTE — ED PROVIDER NOTE - ATTENDING CONTRIBUTION TO CARE
Attending Note:   68 yo M with plan for upcoming catheterizationr due to abn stress test, today he has been having CP described as chest tightness. L chest into L shoulder and arm without any associated sx. Now sx have resolved. On exam: PT in NAD. Cardiac- S1S2. Lungs- CTAB. Abdomen soft NTND. Extremities- No LE edema. Neuro- grossly intact. Plan: Cardiac workup with troponin, EKG, CXR and cardio consult.

## 2020-07-13 NOTE — H&P ADULT - ATTENDING COMMENTS
patient seen and examined independently   agree with above note with the following additions   chets xray which I reviewed shows no acute changes   EKG which I reviewed shows sinus tachy 113     Chest pain:   ruled out for MI  Cath today   asa, lipitor started. start lopressor 50 q12h, on losartan     HTN: uncontrolled. c/w HCTZ, added lopressor, losartan     HLD added statin     mild hypercalcemia 10.2 OPT follow up

## 2020-07-13 NOTE — ED PROVIDER NOTE - PHYSICAL EXAMINATION
CONST: Well appearing in NAD  EYES: PERRL, EOMI, Sclera and conjunctiva clear.   ENT: No nasal discharge.  NECK: Non-tender  CARD: Normal S1 S2; Normal rate and rhythm  RESP: Equal BS B/L, No wheezes, rhonchi or rales. No distress  GI: Soft, non-tender, non-distended.  MS: Normal ROM in all extremities. No midline spinal tenderness.  SKIN: Warm, dry, no acute rashes. Good turgor  NEURO: A&Ox3, No focal deficits. Strength 5/5 with no sensory deficits.

## 2020-07-13 NOTE — H&P ADULT - ASSESSMENT
Patient is a 70yo male with PMH of hypertension, chronic back pain, BPH, GERD, gout, and renal cell carcinoma s/p partial left nephrectomy (9/17/2009) who presented to the ED for acute left-sided chest pain.    #Atypical chest pain vs unstable angina  - Chest pain is not provoked by exertion (occurred at rest) or relieved by rest or nitro  - Abnormal echocardiogram and stress test outpatient (will need to obtain records)  - Patient states chest pain is 1/10 on admission  - Troponin negative x1  - ECG on admission: sinus tachycardia, left anterior fascicular block (new compared to previous ECG)  - Admit to telemetry  - NPO after midnight  - Follow HbA1c and lipid profile in AM  - Pending cardiology consult    #Hypercalcemia  - Calcium on admission: 10.2 (borderline high)  - Patient currently asymptomatic  - Follow repeat calcium in AM    #Hypertension  - Continue with hydrochlorothiazide 12.5mg PO QD  - Convert valsartan 160mg to losartan 50mg PO QD    #GERD  - Convert outpatient omeprazole to pantoprazole 40mg PO QD    #Gout  - Continue with allopurinol 300mg PO QD    #Renal cell carcinoma s/p partial left nephrectomy (9/17/2009)  - Creatinine on admission: 0.9  - Estimated GFR: 87  - Follow outpatient with nephrology (Dr. Sylvia Arce)    #Chronic constipation  - Convert colace to senna 2 tabs PO QHS    #Suspected vitamin deficiencies  - Continue with multivitamin 1 tab PO QD, Lovaza 2g PO BID    #Misc  - DVT Prophylaxis: Lovenox 40mg SQ QHS  - GI Prophylaxis: pantoprazole 40mg PO QD   - Diet: DASH/TLC  - Activity: ambulate as tolerated  - IV Fluids: Start LR at 75mL/hr while NPO  - Code Status: Full Code    Dispo: admit to telemetry, NPO after midnight for cardiac catheterization with Dr. Fernandes Patient is a 70yo male with PMH of hypertension, chronic back pain, BPH, GERD, gout, and renal cell carcinoma s/p partial left nephrectomy (9/17/2009) who presented to the ED for acute left-sided chest pain.    #Atypical chest pain  - Chest pain is not provoked by exertion (occurred at rest) or relieved by rest or nitro  - Abnormal echocardiogram and stress test outpatient (will need to obtain records)  - Patient states chest pain is 1/10 on admission  - Troponin negative x1  - ECG on admission: sinus tachycardia, left anterior fascicular block (new compared to previous ECG)  - Admit to telemetry  - NPO after midnight  - Follow HbA1c and lipid profile in AM  - Pending cardiology consult    #Hypercalcemia  - Calcium on admission: 10.2 (borderline high)  - Patient currently asymptomatic  - Follow repeat calcium in AM    #Hypertension  - Continue with hydrochlorothiazide 12.5mg PO QD  - Convert valsartan 160mg to losartan 50mg PO QD    #GERD  - Convert outpatient omeprazole to pantoprazole 40mg PO QD    #Gout  - Continue with allopurinol 300mg PO QD    #Renal cell carcinoma s/p partial left nephrectomy (9/17/2009)  - Creatinine on admission: 0.9  - Estimated GFR: 87  - Follow outpatient with nephrology (Dr. Sylvia Arce)    #Chronic constipation  - Convert colace to senna 2 tabs PO QHS    #Suspected vitamin deficiencies  - Continue with multivitamin 1 tab PO QD, Lovaza 2g PO BID    #Misc  - DVT Prophylaxis: Lovenox 40mg SQ QHS  - GI Prophylaxis: pantoprazole 40mg PO QD   - Diet: DASH/TLC  - Activity: ambulate as tolerated  - IV Fluids: Start LR at 75mL/hr while NPO  - Code Status: Full Code    Dispo: admit to telemetry, NPO after midnight for cardiac catheterization with Dr. Fernandes

## 2020-07-13 NOTE — H&P ADULT - HISTORY OF PRESENT ILLNESS
[69y man]    CC: chest pain    PMH: hypertension, chronic back pain, BPH, GERD, gout, and renal cell carcinoma s/p partial left nephrectomy (9/17/2009)    PSH: Bladder polyp S/p removal (4/4/2019 by Dr. Wylie), TURP (7/27/2005 by Dr. Mendez), laminectomy of L3/L4 (9/11/2002 by Dr. Jara), partial left nephrectomy (9/17/2009 by Dr. Vergara), spinal fusion (L4/5-L5/S1 6/23/2008 by Dr. Jara) with revision of fusion Transome Axial JF (3/1/2020 by Dr. 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 The patient was told the results were abnormal and that he had "a possible blockage." He was scheduled for elective cardiac catheterization with Dr. Fernandes on 7/23/2020. On the day of presentation, the patient had finished eat breakfast ("a bowl of Cheerios for the heart") and was sitting down talking to his wife at home when he began developing back pain behind his left shoulder. He said that the pain was different from the chronic back pain that he usually feels. The pain was 6/10 intensity that did not radiate and was accompanied with chest tightness and nausea. He decided to come to ED, worried that something was wrong. By the time the patient arrived at the hospital, his chest pain had diminished to a 3-4/10 intensity and the nausea had resolved.    In the ED, vital signs were Tmax 98.7F, , /91, RR 16, SpO2 99% on room air. Patient was given aspirin 162mg PO x1 dose. On sign out, ED mentioned possible Q waves on ECG but I did not appreciated them. ECG noted for left anterior fascicular block. Troponin negative x1. Per ED, patient is scheduled in AM for cardiac catheterization with Dr. Fernandes. When I spoke to the patient, the pain had mostly resolved and was now a 1/10.

## 2020-07-13 NOTE — ED PROVIDER NOTE - OBJECTIVE STATEMENT
70yo male with PMHx L nephrectomy s/p remote renal CA, BPH, HTN, gout, chronic back pain, scheduled for cath 7/23 (Kacy Gonzalez) presents c/o L sided CP with radiation to L shoulder, down L arm, and mid-back, s/p eating breakfast at 0830. Pt denies SOB, diaphoresis, nausea. Pt took 162mg ASA and his AM meds. CP has since resolved but mid-back discomfort still persists. Pt notes he recently underwent treadmill stress testing and echo and was told he needed a cardiac cath.

## 2020-07-14 LAB
A1C WITH ESTIMATED AVERAGE GLUCOSE RESULT: 6 % — HIGH (ref 4–5.6)
ANION GAP SERPL CALC-SCNC: 17 MMOL/L — HIGH (ref 7–14)
APPEARANCE UR: CLEAR — SIGNIFICANT CHANGE UP
APTT BLD: 32.4 SEC — SIGNIFICANT CHANGE UP (ref 27–39.2)
BACTERIA # UR AUTO: NEGATIVE — SIGNIFICANT CHANGE UP
BILIRUB UR-MCNC: NEGATIVE — SIGNIFICANT CHANGE UP
BLD GP AB SCN SERPL QL: SIGNIFICANT CHANGE UP
BUN SERPL-MCNC: 17 MG/DL — SIGNIFICANT CHANGE UP (ref 10–20)
CALCIUM SERPL-MCNC: 10.2 MG/DL — HIGH (ref 8.5–10.1)
CHLORIDE SERPL-SCNC: 101 MMOL/L — SIGNIFICANT CHANGE UP (ref 98–110)
CHOLEST SERPL-MCNC: 227 MG/DL — HIGH (ref 100–200)
CO2 SERPL-SCNC: 23 MMOL/L — SIGNIFICANT CHANGE UP (ref 17–32)
COLOR SPEC: YELLOW — SIGNIFICANT CHANGE UP
CREAT SERPL-MCNC: 0.8 MG/DL — SIGNIFICANT CHANGE UP (ref 0.7–1.5)
DIFF PNL FLD: NEGATIVE — SIGNIFICANT CHANGE UP
EPI CELLS # UR: 1 /HPF — SIGNIFICANT CHANGE UP (ref 0–5)
ESTIMATED AVERAGE GLUCOSE: 126 MG/DL — HIGH (ref 68–114)
GLUCOSE SERPL-MCNC: 117 MG/DL — HIGH (ref 70–99)
GLUCOSE UR QL: NEGATIVE — SIGNIFICANT CHANGE UP
HCT VFR BLD CALC: 50.5 % — SIGNIFICANT CHANGE UP (ref 42–52)
HCV AB S/CO SERPL IA: 0.03 COI — SIGNIFICANT CHANGE UP
HCV AB SERPL-IMP: SIGNIFICANT CHANGE UP
HDLC SERPL-MCNC: 50 MG/DL — SIGNIFICANT CHANGE UP
HGB BLD-MCNC: 16.8 G/DL — SIGNIFICANT CHANGE UP (ref 14–18)
HYALINE CASTS # UR AUTO: 5 /LPF — SIGNIFICANT CHANGE UP (ref 0–7)
INR BLD: 0.97 RATIO — SIGNIFICANT CHANGE UP (ref 0.65–1.3)
KETONES UR-MCNC: NEGATIVE — SIGNIFICANT CHANGE UP
LEUKOCYTE ESTERASE UR-ACNC: NEGATIVE — SIGNIFICANT CHANGE UP
LIPID PNL WITH DIRECT LDL SERPL: 147 MG/DL — HIGH (ref 4–129)
MCHC RBC-ENTMCNC: 31.8 PG — HIGH (ref 27–31)
MCHC RBC-ENTMCNC: 33.3 G/DL — SIGNIFICANT CHANGE UP (ref 32–37)
MCV RBC AUTO: 95.6 FL — HIGH (ref 80–94)
NITRITE UR-MCNC: NEGATIVE — SIGNIFICANT CHANGE UP
NRBC # BLD: 0 /100 WBCS — SIGNIFICANT CHANGE UP (ref 0–0)
PH UR: 6 — SIGNIFICANT CHANGE UP (ref 5–8)
PLATELET # BLD AUTO: 190 K/UL — SIGNIFICANT CHANGE UP (ref 130–400)
POTASSIUM SERPL-MCNC: 4.1 MMOL/L — SIGNIFICANT CHANGE UP (ref 3.5–5)
POTASSIUM SERPL-SCNC: 4.1 MMOL/L — SIGNIFICANT CHANGE UP (ref 3.5–5)
PROT UR-MCNC: ABNORMAL
PROTHROM AB SERPL-ACNC: 11.2 SEC — SIGNIFICANT CHANGE UP (ref 9.95–12.87)
RBC # BLD: 5.28 M/UL — SIGNIFICANT CHANGE UP (ref 4.7–6.1)
RBC # FLD: 14.6 % — HIGH (ref 11.5–14.5)
RBC CASTS # UR COMP ASSIST: 2 /HPF — SIGNIFICANT CHANGE UP (ref 0–4)
SARS-COV-2 RNA SPEC QL NAA+PROBE: SIGNIFICANT CHANGE UP
SODIUM SERPL-SCNC: 141 MMOL/L — SIGNIFICANT CHANGE UP (ref 135–146)
SP GR SPEC: >1.05 (ref 1.01–1.02)
TOTAL CHOLESTEROL/HDL RATIO MEASUREMENT: 4.5 RATIO — SIGNIFICANT CHANGE UP (ref 4–5.5)
TRIGL SERPL-MCNC: 205 MG/DL — HIGH (ref 10–149)
UROBILINOGEN FLD QL: SIGNIFICANT CHANGE UP
WBC # BLD: 7.84 K/UL — SIGNIFICANT CHANGE UP (ref 4.8–10.8)
WBC # FLD AUTO: 7.84 K/UL — SIGNIFICANT CHANGE UP (ref 4.8–10.8)
WBC UR QL: 2 /HPF — SIGNIFICANT CHANGE UP (ref 0–5)

## 2020-07-14 PROCEDURE — 99231 SBSQ HOSP IP/OBS SF/LOW 25: CPT | Mod: 57

## 2020-07-14 PROCEDURE — 93306 TTE W/DOPPLER COMPLETE: CPT | Mod: 26

## 2020-07-14 PROCEDURE — 71250 CT THORAX DX C-: CPT | Mod: 26

## 2020-07-14 PROCEDURE — 93880 EXTRACRANIAL BILAT STUDY: CPT | Mod: 26

## 2020-07-14 PROCEDURE — 99223 1ST HOSP IP/OBS HIGH 75: CPT | Mod: AI

## 2020-07-14 PROCEDURE — 93010 ELECTROCARDIOGRAM REPORT: CPT

## 2020-07-14 RX ORDER — HEPARIN SODIUM 5000 [USP'U]/ML
1000 INJECTION INTRAVENOUS; SUBCUTANEOUS
Qty: 25000 | Refills: 0 | Status: DISCONTINUED | OUTPATIENT
Start: 2020-07-14 | End: 2020-07-15

## 2020-07-14 RX ORDER — CHLORHEXIDINE GLUCONATE 213 G/1000ML
15 SOLUTION TOPICAL ONCE
Refills: 0 | Status: COMPLETED | OUTPATIENT
Start: 2020-07-14 | End: 2020-07-14

## 2020-07-14 RX ORDER — METOPROLOL TARTRATE 50 MG
50 TABLET ORAL ONCE
Refills: 0 | Status: COMPLETED | OUTPATIENT
Start: 2020-07-14 | End: 2020-07-14

## 2020-07-14 RX ORDER — CHLORHEXIDINE GLUCONATE 213 G/1000ML
1 SOLUTION TOPICAL ONCE
Refills: 0 | Status: COMPLETED | OUTPATIENT
Start: 2020-07-14 | End: 2020-07-14

## 2020-07-14 RX ORDER — METOPROLOL TARTRATE 50 MG
50 TABLET ORAL
Refills: 0 | Status: DISCONTINUED | OUTPATIENT
Start: 2020-07-14 | End: 2020-07-14

## 2020-07-14 RX ORDER — ATORVASTATIN CALCIUM 80 MG/1
80 TABLET, FILM COATED ORAL AT BEDTIME
Refills: 0 | Status: DISCONTINUED | OUTPATIENT
Start: 2020-07-14 | End: 2020-07-15

## 2020-07-14 RX ADMIN — SENNA PLUS 2 TABLET(S): 8.6 TABLET ORAL at 22:54

## 2020-07-14 RX ADMIN — CHLORHEXIDINE GLUCONATE 15 MILLILITER(S): 213 SOLUTION TOPICAL at 22:56

## 2020-07-14 RX ADMIN — HEPARIN SODIUM 10 UNIT(S)/HR: 5000 INJECTION INTRAVENOUS; SUBCUTANEOUS at 17:02

## 2020-07-14 RX ADMIN — SODIUM CHLORIDE 75 MILLILITER(S): 9 INJECTION, SOLUTION INTRAVENOUS at 06:01

## 2020-07-14 RX ADMIN — LOSARTAN POTASSIUM 50 MILLIGRAM(S): 100 TABLET, FILM COATED ORAL at 06:00

## 2020-07-14 RX ADMIN — Medication 12.5 MILLIGRAM(S): at 06:00

## 2020-07-14 RX ADMIN — ATORVASTATIN CALCIUM 80 MILLIGRAM(S): 80 TABLET, FILM COATED ORAL at 22:54

## 2020-07-14 RX ADMIN — PANTOPRAZOLE SODIUM 40 MILLIGRAM(S): 20 TABLET, DELAYED RELEASE ORAL at 06:00

## 2020-07-14 RX ADMIN — Medication 81 MILLIGRAM(S): at 11:08

## 2020-07-14 RX ADMIN — Medication 50 MILLIGRAM(S): at 22:56

## 2020-07-14 RX ADMIN — Medication 50 MILLIGRAM(S): at 10:24

## 2020-07-14 RX ADMIN — Medication 2 GRAM(S): at 06:02

## 2020-07-14 RX ADMIN — CHLORHEXIDINE GLUCONATE 1 APPLICATION(S): 213 SOLUTION TOPICAL at 22:57

## 2020-07-14 NOTE — PROGRESS NOTE ADULT - ASSESSMENT
Patient is a 68yo male with PMH of hypertension, chronic back pain, BPH, GERD, gout, and renal cell carcinoma s/p partial left nephrectomy  who presented to the ED for acute left-sided chest pain.    # Atypical chest pain vs unstable angina - occurred at rest - resolved (1/10 today)  - Reported abnormal Echo and Stress test outpatient  - ECG on admission: sinus tachycardia, left anterior fascicular block (new compared to previous ECG)  - Troponin negative x1  - Scheduled for LHC/PCI this morning --> f/u results  - f/u lipid profile and HgbA1c  - started lipitor 80 BID  - cont asa  - f/u cardio    # HTN  - Cont hydrochlorothiazide 12.5mg qd & losartan 50mg qd  - started Lopressor 50mg BID  - DASH diet    # GERD  - cont PPI    # Gout  - cont allopurinol 300mg qd    # Renal cell carcinoma s/p partial left nephrectomy (9/17/2009)  - Kidney function stable  - Follow outpatient with nephrology (Dr. Sylvia Arce)    # Chronic constipation  - cont senna 2 tabs PO QHS    # Hypercalcemia / Suspected vitamin deficiencies   - monitor and replete as needed  - cont multivitamin, Lovaza    GI ppx:                                   [] Not indicated   /   [x] Pantoprazole 40mg PO Daily    DVT ppx:  [] Not indicated / [] Heparin 5000mg SubQ / [x] Lovenox 40mg SubQ / [] SCDs    Fluids:   [] PO  |  [x] IVF    Activity:  [] Ad Dayana  /  [X] Increase as Tolerated  /  [] OOB w/ assist  /  [] Bed Rest    DISPO:  Patient to be discharged when condition(s) optimized.  [x] Home  /  [] SNF  /  [] Long Term       [X] Discussion with patient and/or proxy regarding goals of care.  [X] Discussed Case and Plan with the Medical Attending.    CODE STATUS  [X] FULL   /    [] DNR    Please call Dr. GRIS Marquez with any questions

## 2020-07-14 NOTE — CONSULT NOTE ADULT - ASSESSMENT
Surgeon: / Yesi? Pearl    Consult requesting by: DR Woodruff    HISTORY OF PRESENT ILLNESS:  This is 70y/o male with PMH of hypertension, chronic back pain, BPH, GERD, gout, and renal cell carcinoma s/p partial left nephrectomy (9/17/2009) presented to ED with complaints of nonradiating chest pressure and back pain. History dates back to a month ago when the patient went to his cardiologist for an echocardiogram and stress test. The patient was told the results were abnormal and that he had "a possible blockage." He was scheduled for elective cardiac catheterization with Dr. Fernandes on 7/23/2020. On the day of presentation, the patient had finished eat breakfast ("a bowl of Cheerios for the heart") and was sitting down talking to his wife at home when he began developing back pain behind his left shoulder. He said that the pain was different from the chronic back pain that he usually feels. The pain was 6/10 intensity that did not radiate and was accompanied with chest tightness and nausea. He decided to come to ED, worried that something was wrong. By the time the patient arrived at the hospital, his chest pain had diminished to a 3-4/10 intensity and the nausea had resolved.    In the ED, vital signs were Tmax 98.7F, , /91, RR 16, SpO2 99% on room air. Patient was given aspirin 162mg PO x1 dose. On sign out, ED mentioned possible Q waves on ECG but I did not appreciated them. ECG noted for left anterior fascicular block. Troponin negative x1. Per ED, patient is scheduled in AM for cardiac catheterization with Dr. Fernandes. When I spoke to the patient, the pain had mostly resolved and was now a 1/10.     Pt has subsequent cardiac cath which revealed LM/3vCAD. CT surgery called for CABG evaluation.     NYHA functional class    [ ] Class I (no limitation) [ ] Class II (slight limitation) [ ] Class III (marked limitation) [ ] Class IV (symptoms at rest)    PAST MEDICAL & SURGICAL HISTORY:  BPH (benign prostatic hyperplasia)  GERD (gastroesophageal reflux disease)  Back pain  HTN (hypertension)  Gout  Renal cancer, left: Renal cell carcinoma s/p partial left nephrectomy (20% removed)  Bladder polyp: S/p removal 4/4/2019 by Dr. Wylie  H/O spinal fusion: L4/5-L5/S1 6/23/2008 by Dr. Jara with revision of fusion Transome Axial JF 3/1/2020 by Dr. Jara  H/O laminectomy: L3/L4 9/11/2002 by Dr. Marek Clay tumor: Removed 2/3/2006 by Dr. Prieto  H/O cystoscopy: TURP 7/27/2005 by Dr. Mendez  H/O partial nephrectomy: 9/17/2009 by Dr. Vergara      MEDICATIONS  (STANDING):  allopurinol 300 milliGRAM(s) Oral daily  aspirin enteric coated 81 milliGRAM(s) Oral daily  atorvastatin 80 milliGRAM(s) Oral at bedtime  chlorhexidine 4% Liquid 1 Application(s) Topical <User Schedule>  hydrochlorothiazide 12.5 milliGRAM(s) Oral daily  losartan 50 milliGRAM(s) Oral daily  metoprolol tartrate 50 milliGRAM(s) Oral two times a day  multivitamin 1 Tablet(s) Oral daily  omega-3-Acid Ethyl Esters 2 Gram(s) Oral two times a day  pantoprazole    Tablet 40 milliGRAM(s) Oral before breakfast  senna 2 Tablet(s) Oral at bedtime  sodium chloride 0.9%. 1000 milliLiter(s) (250 mL/Hr) IV Continuous <Continuous>    MEDICATIONS  (PRN):    Antiplatelet therapy:        none                   Last dose/amt:    Allergies    adhesives (Rash)  Keflex (Diarrhea)  methylPREDNISolone (Pruritus)  rash (Rash)    Intolerances        SOCIAL HISTORY:  Smoker: [x ] Yes  [ ] No        PACK YEARS:    1.5 pack x 44 years                     WHEN QUIT? 2009  ETOH use: [ ] Yes  [ x] No              FREQUENCY / QUANTITY:  Ilicit Drug use:  [ ] Yes  [x ] No  Occupation: retired  Lives with: wife  Assisted device use: none  5 meter walk test: 1____sec, 2____sec, 3___sec, unable to do just cath  FAMILY HISTORY: denies family history of heart disease      Review of Systems  CONSTITUTIONAL:  Fevers[ ] chills[ ] sweats[ ] fatigue[ ] weight loss[ ] weight gain [ ]                                     NEGATIVE [X ]   NEURO:  parathesias[ ] seizures [ ]  syncope [ ]  confusion [ ]                                                                                NEGATIVE[ X]   EYES: glasses[ ]  blurry vision[ ]  discharge[ ] pain[ ] glaucoma [ ]                                                                          NEGATIVE[X ]   ENMT:  difficulty hearing [ ]  vertigo[ ]  dysphagia[ ] epistaxis[ ] recent dental work [ ]                                    NEGATIVE[ X]   CV:  chest pain[ ] palpitations[ ] MENDEZ [ ] diaphoresis [ ]                                                                                           NEGATIVE[ X]   RESPIRATORY:  wheezing[ ] SOB[ ] cough [ ] sputum[ ] hemoptysis[ ]                                                                  NEGATIVE[ ]   GI:  nausea[ ]  vommiting [ ]  diarrhea[ ] constipation [ ] melena [ ]                                                                      NEGATIVE[ X]   : hematuria[ ]  dysuria[ ] urgency[ ] incontinence[ ]                                                                                            NEGATIVE[ X]   MUSKULOSKELETAL:  arthritis[ ]  joint swelling [ ] muscle weakness [ ] Hx vein stripping [ ]                             NEGATIVE[X ]   SKIN/BREAST:  rash[ ] itching [ ]  hair loss[ ] masses[ ]                                                                                              NEGATIVE[ X]   PSYCH:  dementia [ ] depresion [ ] anxiety[ ]                                                                                                               NEGATIVE[X ]   HEME/LYMPH:  bruises easily[ ] enlarged lymph nodes[ ] tender lymph nodes[ ]                                               NEGATIVE[ X]   ENDOCRINE:  cold intolerance[ ] heat intolerance[ ] polydipsia[ ]                                                                          NEGATIVE[ X]     PHYSICAL EXAM  Vital Signs Last 24 Hrs  T(C): 35.8 (14 Jul 2020 06:51), Max: 37.2 (13 Jul 2020 23:15)  T(F): 96.5 (14 Jul 2020 06:51), Max: 98.9 (13 Jul 2020 23:15)  HR: 107 (14 Jul 2020 10:22) (100 - 116)  BP: 171/76 (14 Jul 2020 10:22) (140/66 - 176/90)  RR: 18 (14 Jul 2020 06:51) (18 - 18)  SpO2: 96% (14 Jul 2020 07:35) (96% - 98%)      CONSTITUTIONAL:                                                                          WNL[ x]   Neuro: WNL[ x] Normal exam oriented to person/place & time with no focal motor or sensory  deficits. Other                     Eyes: WNL[x ]   Normal exam of conjunctiva & lids, pupils equally reactive. Other     ENT: WNL[x ]    Normal exam of nasal/oral mucosa with absence of cyanosis. Other  Neck: WNL[x ]  Normal exam of jugular veins, trachea & thyroid. Other  Chest: WNL[x ] Normal lung exam with good air movement absence of wheezes, rales, or rhonchi: Other                                                                                CV:  Auscultation: normal [ x] S3[ ] S4[ ] Irregular [ ] Rub[ ] Clicks[ ]    Murmurs none:[x ]systolic [ ]  diastolic [ ] holosystolic [ ]  Carotids: No Bruits[x ] Other                 Abdominal Aorta: normal [ ] nonpalpable[ ]Other                                                                                      GI:           WNL[x ] Normal exam of abdomen, liver & spleen with no noted masses or tenderness. Other                                                                                                        Extremities: WNL[x ] Normal no evidence of cyanosis or deformity Edema: none[ ]trace[ ]1+[ ]2+[ ]3+[ ]4+[ ]  Lower Extremity Pulses: Right[ ] Left[ ]Varicosities[ ]  SKIN :WNL[x ] Normal exam to inspection & palation. Other:                                                          LABS:                        16.8   7.84  )-----------( 190      ( 14 Jul 2020 08:52 )             50.5     07-14    141  |  101  |  17  ----------------------------<  117<H>  4.1   |  23  |  0.8    Ca    10.2<H>      14 Jul 2020 08:52    TPro  7.4  /  Alb  4.4  /  TBili  0.6  /  DBili  x   /  AST  22  /  ALT  24  /  AlkPhos  60  07-13    PT/INR - ( 14 Jul 2020 08:52 )   PT: 11.20 sec;   INR: 0.97 ratio         PTT - ( 14 Jul 2020 08:52 )  PTT:32.4 sec    CARDIAC MARKERS ( 13 Jul 2020 11:20 )  x     / <0.01 ng/mL / x     / x     / x              Cardiac Cath:     TTE / JOSE DE JESUS: pending    Recommendation: (Procedures/Evaluations)  CT HEAD Nonn-Contrast:[  ]  CT Chest without contrast [x ]  Carotid Duplex :[ x]  KAMILLA/PVR: [ ]  PFT : Simple PFT [x ]  Full [ ]  Renal Consult [ ]  Pulmonary Consult: [ ]   Vascular Consult [ ]    Dental Consult [ ]   Hem-Onc Consult [ ]   GI Consult [ ]   Other Consultations :    STS Score:     Impression:    CAD [ x]  Valvular  disease [ ]   Aortic Disease [ ]   FÉLIX: Yes[ ] No [ ]   CKD Stage I [ ] , Stage II [ ] , Stage III [ ], Stage IV [ ]   Anemia: Yes [ ], No [ ]  Diabetes :Yes [ ], No [ ]  Acute MI: Yes [ ], No [ ]   Heart Failure: Yes [ ] , No [ ] HFpEF [ ], HFrEF [ ]        Assessment/ Plan:  69 year-old male with PMH as above presents with chest pressure, ruled out AMI, subsequent cardiac cath showed LM/3vCAD  will need CABG  will discuss with CT Surgeon Surgeon: / Yesi/ Pearl    Consult requesting by: DR Woodruff    HISTORY OF PRESENT ILLNESS:  This is 70y/o male with PMH of hypertension, chronic back pain, BPH, GERD, gout, and renal cell carcinoma s/p partial left nephrectomy (9/17/2009) presented to ED with complaints of nonradiating chest pressure and back pain. History dates back to a month ago when the patient went to his cardiologist for an echocardiogram and stress test. The patient was told the results were abnormal and that he had "a possible blockage." He was scheduled for elective cardiac catheterization with Dr. Fernandes on 7/23/2020. On the day of presentation, the patient had finished eat breakfast ("a bowl of Cheerios for the heart") and was sitting down talking to his wife at home when he began developing back pain behind his left shoulder. He said that the pain was different from the chronic back pain that he usually feels. The pain was 6/10 intensity that did not radiate and was accompanied with chest tightness and nausea. He decided to come to ED, worried that something was wrong. By the time the patient arrived at the hospital, his chest pain had diminished to a 3-4/10 intensity and the nausea had resolved.    In the ED, vital signs were Tmax 98.7F, , /91, RR 16, SpO2 99% on room air. Patient was given aspirin 162mg PO x1 dose. On sign out, ED mentioned possible Q waves on ECG but I did not appreciated them. ECG noted for left anterior fascicular block. Troponin negative x1. Per ED, patient is scheduled in AM for cardiac catheterization with Dr. Fernandes. When I spoke to the patient, the pain had mostly resolved and was now a 1/10.     Pt has subsequent cardiac cath which revealed LM/3vCAD. CT surgery called for CABG evaluation.     NYHA functional class    [ ] Class I (no limitation) [ ] Class II (slight limitation) [ ] Class III (marked limitation) [ ] Class IV (symptoms at rest)    PAST MEDICAL & SURGICAL HISTORY:  BPH (benign prostatic hyperplasia)  GERD (gastroesophageal reflux disease)  Back pain  HTN (hypertension)  Gout  Renal cancer, left: Renal cell carcinoma s/p partial left nephrectomy (20% removed)  Bladder polyp: S/p removal 4/4/2019 by Dr. Wylie  H/O spinal fusion: L4/5-L5/S1 6/23/2008 by Dr. Jara with revision of fusion Transome Axial JF 3/1/2020 by Dr. Jara  H/O laminectomy: L3/L4 9/11/2002 by Dr. Marek Clay tumor: Removed 2/3/2006 by Dr. Prieto  H/O cystoscopy: TURP 7/27/2005 by Dr. Mendez  H/O partial nephrectomy: 9/17/2009 by Dr. Vergara      MEDICATIONS  (STANDING):  allopurinol 300 milliGRAM(s) Oral daily  aspirin enteric coated 81 milliGRAM(s) Oral daily  atorvastatin 80 milliGRAM(s) Oral at bedtime  chlorhexidine 4% Liquid 1 Application(s) Topical <User Schedule>  hydrochlorothiazide 12.5 milliGRAM(s) Oral daily  losartan 50 milliGRAM(s) Oral daily  metoprolol tartrate 50 milliGRAM(s) Oral two times a day  multivitamin 1 Tablet(s) Oral daily  omega-3-Acid Ethyl Esters 2 Gram(s) Oral two times a day  pantoprazole    Tablet 40 milliGRAM(s) Oral before breakfast  senna 2 Tablet(s) Oral at bedtime  sodium chloride 0.9%. 1000 milliLiter(s) (250 mL/Hr) IV Continuous <Continuous>    MEDICATIONS  (PRN):    Antiplatelet therapy:        none                   Last dose/amt:    Allergies    adhesives (Rash)  Keflex (Diarrhea)  methylPREDNISolone (Pruritus)  rash (Rash)    Intolerances        SOCIAL HISTORY:  Smoker: [x ] Yes  [ ] No        PACK YEARS:    1.5 pack x 44 years                     WHEN QUIT? 2009  ETOH use: [ ] Yes  [ x] No              FREQUENCY / QUANTITY:  Ilicit Drug use:  [ ] Yes  [x ] No  Occupation: retired  Lives with: wife  Assisted device use: none  5 meter walk test: 1____sec, 2____sec, 3___sec, unable to do just cath  FAMILY HISTORY: denies family history of heart disease      Review of Systems  CONSTITUTIONAL:  Fevers[ ] chills[ ] sweats[ ] fatigue[ ] weight loss[ ] weight gain [ ]                                     NEGATIVE [X ]   NEURO:  parathesias[ ] seizures [ ]  syncope [ ]  confusion [ ]                                                                                NEGATIVE[ X]   EYES: glasses[ ]  blurry vision[ ]  discharge[ ] pain[ ] glaucoma [ ]                                                                          NEGATIVE[X ]   ENMT:  difficulty hearing [ ]  vertigo[ ]  dysphagia[ ] epistaxis[ ] recent dental work [ ]                                    NEGATIVE[ X]   CV:  chest pain[ ] palpitations[ ] MENDEZ [ ] diaphoresis [ ]                                                                                           NEGATIVE[ X]   RESPIRATORY:  wheezing[ ] SOB[ ] cough [ ] sputum[ ] hemoptysis[ ]                                                                  NEGATIVE[ ]   GI:  nausea[ ]  vommiting [ ]  diarrhea[ ] constipation [ ] melena [ ]                                                                      NEGATIVE[ X]   : hematuria[ ]  dysuria[ ] urgency[ ] incontinence[ ]                                                                                            NEGATIVE[ X]   MUSKULOSKELETAL:  arthritis[ ]  joint swelling [ ] muscle weakness [ ] Hx vein stripping [ ]                             NEGATIVE[X ]   SKIN/BREAST:  rash[ ] itching [ ]  hair loss[ ] masses[ ]                                                                                              NEGATIVE[ X]   PSYCH:  dementia [ ] depresion [ ] anxiety[ ]                                                                                                               NEGATIVE[X ]   HEME/LYMPH:  bruises easily[ ] enlarged lymph nodes[ ] tender lymph nodes[ ]                                               NEGATIVE[ X]   ENDOCRINE:  cold intolerance[ ] heat intolerance[ ] polydipsia[ ]                                                                          NEGATIVE[ X]     PHYSICAL EXAM  Vital Signs Last 24 Hrs  T(C): 35.8 (14 Jul 2020 06:51), Max: 37.2 (13 Jul 2020 23:15)  T(F): 96.5 (14 Jul 2020 06:51), Max: 98.9 (13 Jul 2020 23:15)  HR: 107 (14 Jul 2020 10:22) (100 - 116)  BP: 171/76 (14 Jul 2020 10:22) (140/66 - 176/90)  RR: 18 (14 Jul 2020 06:51) (18 - 18)  SpO2: 96% (14 Jul 2020 07:35) (96% - 98%)      CONSTITUTIONAL:                                                                          WNL[ x]   Neuro: WNL[ x] Normal exam oriented to person/place & time with no focal motor or sensory  deficits. Other                     Eyes: WNL[x ]   Normal exam of conjunctiva & lids, pupils equally reactive. Other     ENT: WNL[x ]    Normal exam of nasal/oral mucosa with absence of cyanosis. Other  Neck: WNL[x ]  Normal exam of jugular veins, trachea & thyroid. Other  Chest: WNL[x ] Normal lung exam with good air movement absence of wheezes, rales, or rhonchi: Other                                                                                CV:  Auscultation: normal [ x] S3[ ] S4[ ] Irregular [ ] Rub[ ] Clicks[ ]    Murmurs none:[x ]systolic [ ]  diastolic [ ] holosystolic [ ]  Carotids: No Bruits[x ] Other                 Abdominal Aorta: normal [ ] nonpalpable[ ]Other                                                                                      GI:           WNL[x ] Normal exam of abdomen, liver & spleen with no noted masses or tenderness. Other                                                                                                        Extremities: WNL[x ] Normal no evidence of cyanosis or deformity Edema: none[ ]trace[ ]1+[ ]2+[ ]3+[ ]4+[ ]  Lower Extremity Pulses: Right[ ] Left[ ]Varicosities[ ]  SKIN :WNL[x ] Normal exam to inspection & palation. Other:                                                          LABS:                        16.8   7.84  )-----------( 190      ( 14 Jul 2020 08:52 )             50.5     07-14    141  |  101  |  17  ----------------------------<  117<H>  4.1   |  23  |  0.8    Ca    10.2<H>      14 Jul 2020 08:52    TPro  7.4  /  Alb  4.4  /  TBili  0.6  /  DBili  x   /  AST  22  /  ALT  24  /  AlkPhos  60  07-13    PT/INR - ( 14 Jul 2020 08:52 )   PT: 11.20 sec;   INR: 0.97 ratio         PTT - ( 14 Jul 2020 08:52 )  PTT:32.4 sec    CARDIAC MARKERS ( 13 Jul 2020 11:20 )  x     / <0.01 ng/mL / x     / x     / x              Cardiac Cath:     TTE / JOSE DE JESUS: pending    Recommendation: (Procedures/Evaluations)  CT HEAD Nonn-Contrast:[  ]  CT Chest without contrast [x ]  Carotid Duplex :[ x]  KAMILLA/PVR: [ ]  PFT : Simple PFT [x ]  Full [ ]  Renal Consult [ ]  Pulmonary Consult: [ ]   Vascular Consult [ ]    Dental Consult [ ]   Hem-Onc Consult [ ]   GI Consult [ ]   Other Consultations :    STS Score:     Impression:    CAD [ x]  Valvular  disease [ ]   Aortic Disease [ ]   FÉLIX: Yes[ ] No [ ]   CKD Stage I [ ] , Stage II [ ] , Stage III [ ], Stage IV [ ]   Anemia: Yes [ ], No [ ]  Diabetes :Yes [ ], No [ ]  Acute MI: Yes [ ], No [ ]   Heart Failure: Yes [ ] , No [ ] HFpEF [ ], HFrEF [ ]        Assessment/ Plan:  69 year-old male with PMH as above presents with chest pressure, ruled out AMI, subsequent cardiac cath showed LM/3vCAD  will need CABG  will discuss with CT Surgeon Surgeon: / Yesi/ Pearl    Consult requesting by: DR Woodruff    HISTORY OF PRESENT ILLNESS:  This is 70y/o male with PMH of hypertension, chronic back pain, BPH, GERD, gout, and renal cell carcinoma s/p partial left nephrectomy (9/17/2009) presented to ED with complaints of nonradiating chest pressure and back pain. History dates back to a month ago when the patient went to his cardiologist for an echocardiogram and stress test. The patient was told the results were abnormal and that he had "a possible blockage." He was scheduled for elective cardiac catheterization with Dr. Fernandes on 7/23/2020. On the day of presentation, the patient had finished eat breakfast ("a bowl of Cheerios for the heart") and was sitting down talking to his wife at home when he began developing back pain behind his left shoulder. He said that the pain was different from the chronic back pain that he usually feels. The pain was 6/10 intensity that did not radiate and was accompanied with chest tightness and nausea. He decided to come to ED, worried that something was wrong. By the time the patient arrived at the hospital, his chest pain had diminished to a 3-4/10 intensity and the nausea had resolved.    In the ED, vital signs were Tmax 98.7F, , /91, RR 16, SpO2 99% on room air. Patient was given aspirin 162mg PO x1 dose. On sign out, ED mentioned possible Q waves on ECG but I did not appreciated them. ECG noted for left anterior fascicular block. Troponin negative x1. Per ED, patient is scheduled in AM for cardiac catheterization with Dr. Fernandes. When I spoke to the patient, the pain had mostly resolved and was now a 1/10.     Pt has subsequent cardiac cath which revealed LM/3vCAD. CT surgery called for CABG evaluation.     NYHA functional class    [ ] Class I (no limitation) [ ] Class II (slight limitation) [ ] Class III (marked limitation) [ ] Class IV (symptoms at rest)    PAST MEDICAL & SURGICAL HISTORY:  BPH (benign prostatic hyperplasia)  GERD (gastroesophageal reflux disease)  Back pain  HTN (hypertension)  Gout  Renal cancer, left: Renal cell carcinoma s/p partial left nephrectomy (20% removed)  Bladder polyp: S/p removal 4/4/2019 by Dr. Wylie  H/O spinal fusion: L4/5-L5/S1 6/23/2008 by Dr. Jara with revision of fusion Transome Axial JF 3/1/2020 by Dr. Jara  H/O laminectomy: L3/L4 9/11/2002 by Dr. Marek Clay tumor: Removed 2/3/2006 by Dr. Prieto  H/O cystoscopy: TURP 7/27/2005 by Dr. Mendez  H/O partial nephrectomy: 9/17/2009 by Dr. Vergara      MEDICATIONS  (STANDING):  allopurinol 300 milliGRAM(s) Oral daily  aspirin enteric coated 81 milliGRAM(s) Oral daily  atorvastatin 80 milliGRAM(s) Oral at bedtime  chlorhexidine 4% Liquid 1 Application(s) Topical <User Schedule>  hydrochlorothiazide 12.5 milliGRAM(s) Oral daily  losartan 50 milliGRAM(s) Oral daily  metoprolol tartrate 50 milliGRAM(s) Oral two times a day  multivitamin 1 Tablet(s) Oral daily  omega-3-Acid Ethyl Esters 2 Gram(s) Oral two times a day  pantoprazole    Tablet 40 milliGRAM(s) Oral before breakfast  senna 2 Tablet(s) Oral at bedtime  sodium chloride 0.9%. 1000 milliLiter(s) (250 mL/Hr) IV Continuous <Continuous>    MEDICATIONS  (PRN):    Antiplatelet therapy:        none                   Last dose/amt:    Allergies    adhesives (Rash)  Keflex (Diarrhea)  methylPREDNISolone (Pruritus)  rash (Rash)    Intolerances        SOCIAL HISTORY:  Smoker: [x ] Yes  [ ] No        PACK YEARS:    1.5 pack x 44 years                     WHEN QUIT? 2009  ETOH use: [ ] Yes  [ x] No              FREQUENCY / QUANTITY:  Ilicit Drug use:  [ ] Yes  [x ] No  Occupation: retired  Lives with: wife  Assisted device use: none  5 meter walk test: 1____sec, 2____sec, 3___sec, unable to do just cath  FAMILY HISTORY: denies family history of heart disease      Review of Systems  CONSTITUTIONAL:  Fevers[ ] chills[ ] sweats[ ] fatigue[ ] weight loss[ ] weight gain [ ]                                     NEGATIVE [X ]   NEURO:  parathesias[ ] seizures [ ]  syncope [ ]  confusion [ ]                                                                                NEGATIVE[ X]   EYES: glasses[ ]  blurry vision[ ]  discharge[ ] pain[ ] glaucoma [ ]                                                                          NEGATIVE[X ]   ENMT:  difficulty hearing [ ]  vertigo[ ]  dysphagia[ ] epistaxis[ ] recent dental work [ ]                                    NEGATIVE[ X]   CV:  chest pain[ ] palpitations[ ] MENDEZ [ ] diaphoresis [ ]                                                                                           NEGATIVE[ X]   RESPIRATORY:  wheezing[ ] SOB[ ] cough [ ] sputum[ ] hemoptysis[ ]                                                                  NEGATIVE[ ]   GI:  nausea[ ]  vommiting [ ]  diarrhea[ ] constipation [ ] melena [ ]                                                                      NEGATIVE[ X]   : hematuria[ ]  dysuria[ ] urgency[ ] incontinence[ ]                                                                                            NEGATIVE[ X]   MUSKULOSKELETAL:  arthritis[ ]  joint swelling [ ] muscle weakness [ ] Hx vein stripping [ ]                             NEGATIVE[X ]   SKIN/BREAST:  rash[ ] itching [ ]  hair loss[ ] masses[ ]                                                                                              NEGATIVE[ X]   PSYCH:  dementia [ ] depresion [ ] anxiety[ ]                                                                                                               NEGATIVE[X ]   HEME/LYMPH:  bruises easily[ ] enlarged lymph nodes[ ] tender lymph nodes[ ]                                               NEGATIVE[ X]   ENDOCRINE:  cold intolerance[ ] heat intolerance[ ] polydipsia[ ]                                                                          NEGATIVE[ X]     PHYSICAL EXAM  Vital Signs Last 24 Hrs  T(C): 35.8 (14 Jul 2020 06:51), Max: 37.2 (13 Jul 2020 23:15)  T(F): 96.5 (14 Jul 2020 06:51), Max: 98.9 (13 Jul 2020 23:15)  HR: 107 (14 Jul 2020 10:22) (100 - 116)  BP: 171/76 (14 Jul 2020 10:22) (140/66 - 176/90)  RR: 18 (14 Jul 2020 06:51) (18 - 18)  SpO2: 96% (14 Jul 2020 07:35) (96% - 98%)      CONSTITUTIONAL:                                                                          WNL[ x]   Neuro: WNL[ x] Normal exam oriented to person/place & time with no focal motor or sensory  deficits. Other                     Eyes: WNL[x ]   Normal exam of conjunctiva & lids, pupils equally reactive. Other     ENT: WNL[x ]    Normal exam of nasal/oral mucosa with absence of cyanosis. Other  Neck: WNL[x ]  Normal exam of jugular veins, trachea & thyroid. Other  Chest: WNL[x ] Normal lung exam with good air movement absence of wheezes, rales, or rhonchi: Other                                                                                CV:  Auscultation: normal [ x] S3[ ] S4[ ] Irregular [ ] Rub[ ] Clicks[ ]    Murmurs none:[x ]systolic [ ]  diastolic [ ] holosystolic [ ]  Carotids: No Bruits[x ] Other                 Abdominal Aorta: normal [ ] nonpalpable[ ]Other                                                                                      GI:           WNL[x ] Normal exam of abdomen, liver & spleen with no noted masses or tenderness. Other                                                                                                        Extremities: WNL[x ] Normal no evidence of cyanosis or deformity Edema: none[ ]trace[ ]1+[ ]2+[ ]3+[ ]4+[ ]  Lower Extremity Pulses: Right[ ] Left[ ]Varicosities[ ]  SKIN :WNL[x ] Normal exam to inspection & palation. Other:                                                          LABS:                        16.8   7.84  )-----------( 190      ( 14 Jul 2020 08:52 )             50.5     07-14    141  |  101  |  17  ----------------------------<  117<H>  4.1   |  23  |  0.8    Ca    10.2<H>      14 Jul 2020 08:52    TPro  7.4  /  Alb  4.4  /  TBili  0.6  /  DBili  x   /  AST  22  /  ALT  24  /  AlkPhos  60  07-13    PT/INR - ( 14 Jul 2020 08:52 )   PT: 11.20 sec;   INR: 0.97 ratio         PTT - ( 14 Jul 2020 08:52 )  PTT:32.4 sec    CARDIAC MARKERS ( 13 Jul 2020 11:20 )  x     / <0.01 ng/mL / x     / x     / x              Cardiac Cath:     TTE / JOSE DE JESUS: pending    Recommendation: (Procedures/Evaluations)  CT HEAD Nonn-Contrast:[  ]  CT Chest without contrast [x ]  Carotid Duplex :[ x]  KAMILLA/PVR: [ ]  PFT : Simple PFT [x ]  Full [ ]  Renal Consult [ ]  Pulmonary Consult: [ ]   Vascular Consult [ ]    Dental Consult [ ]   Hem-Onc Consult [ ]   GI Consult [ ]   Other Consultations :    STS Score:   Risk of Mortality:  0.630%  Renal Failure:  0.588%  Permanent Stroke:  0.539%  Prolonged Ventilation:  4.298%  DSW Infection:  0.219%  Reoperation:  1.360%  Morbidity or Mortality:  6.382%  Short Length of Stay:  60.208%  Long Length of Stay:  2.083%  Impression:    CAD [ x]  Valvular  disease [ ]   Aortic Disease [ ]   FÉLIX: Yes[ ] No [ ]   CKD Stage I [ ] , Stage II [ ] , Stage III [ ], Stage IV [ ]   Anemia: Yes [ ], No [ ]  Diabetes :Yes [ ], No [ ]  Acute MI: Yes [ ], No [ ]   Heart Failure: Yes [ ] , No [ ] HFpEF [ ], HFrEF [ ]        Assessment/ Plan:  69 year-old male with PMH as above presents with chest pressure, ruled out AMI, subsequent cardiac cath showed LM/3vCAD  will need CABG  will discuss with CT Surgeon

## 2020-07-14 NOTE — PRE-OP CHECKLIST - ANTIBIOTIC
n/a
I will SWITCH the dose or number of times a day I take the medications listed below when I get home from the hospital:  None

## 2020-07-14 NOTE — PROGRESS NOTE ADULT - SUBJECTIVE AND OBJECTIVE BOX
Cardiac Surgery Pre-op Note:  CC: Patient is a 69y old  Male who presents with a chief complaint of chest pain (14 Jul 2020 12:48)      Referring Physician:             Kacy SWEENEY                                                                                Surgeon:    Dr. Key  Procedure: (Date) (Procedure)  7/15/2020 Coronary Artery Bypass Grafting    Allergies    adhesives (Rash)  Keflex (Diarrhea)  methylPREDNISolone (Pruritus)  rash (Rash)    Intolerances      HPI:  This is 70y/o male with PMH of hypertension, chronic back pain, BPH, GERD, gout, and renal cell carcinoma s/p partial left nephrectomy (9/17/2009) presented to ED with complaints of nonradiating chest pressure and back pain. History dates back to a month ago when the patient went to his cardiologist for an echocardiogram and stress test. The patient was told the results were abnormal and that he had "a possible blockage." He was scheduled for elective cardiac catheterization with Dr. Fernandes on 7/23/2020. On the day of presentation, the patient had finished eat breakfast ("a bowl of Cheerios for the heart") and was sitting down talking to his wife at home when he began developing back pain behind his left shoulder. He said that the pain was different from the chronic back pain that he usually feels. The pain was 6/10 intensity that did not radiate and was accompanied with chest tightness and nausea. He decided to come to ED, worried that something was wrong. By the time the patient arrived at the hospital, his chest pain had diminished to a 3-4/10 intensity and the nausea had resolved.    In the ED, vital signs were Tmax 98.7F, , /91, RR 16, SpO2 99% on room air. Patient was given aspirin 162mg PO x1 dose. On sign out, ED mentioned possible Q waves on ECG but I did not appreciated them. ECG noted for left anterior fascicular block. Troponin negative x1. Per ED, patient is scheduled in AM for cardiac catheterization with Dr. Fernandes. When I spoke to the patient, the pain had mostly resolved and was now a 1/10.     Pt has subsequent cardiac cath which revealed LM/3vCAD. CT surgery called for CABG evaluation.     PAST MEDICAL & SURGICAL HISTORY:  BPH (benign prostatic hyperplasia)  GERD (gastroesophageal reflux disease)  Back pain  HTN (hypertension)  Gout  Renal cancer, left: Renal cell carcinoma s/p partial left nephrectomy (20% removed)  Bladder polyp: S/p removal 4/4/2019 by Dr. Wylie  H/O spinal fusion: L4/5-L5/S1 6/23/2008 by Dr. Jara with revision of fusion Transome Axial JF 3/1/2020 by Dr. Jara  H/O laminectomy: L3/L4 9/11/2002 by Dr. Marek Clay tumor: Removed 2/3/2006 by Dr. Prieto  H/O cystoscopy: TURP 7/27/2005 by Dr. Mendez  H/O partial nephrectomy: 9/17/2009 by Dr. Vergara      MEDICATIONS  (STANDING):  allopurinol 300 milliGRAM(s) Oral daily  aspirin enteric coated 81 milliGRAM(s) Oral daily  atorvastatin 80 milliGRAM(s) Oral at bedtime  chlorhexidine 0.12% Liquid 15 milliLiter(s) Swish and Spit once  chlorhexidine 4% Liquid 1 Application(s) Topical once  chlorhexidine 4% Liquid 1 Application(s) Topical <User Schedule>  heparin  Infusion 1000 Unit(s)/Hr (10 mL/Hr) IV Continuous <Continuous>  metoprolol tartrate 50 milliGRAM(s) Oral once  multivitamin 1 Tablet(s) Oral daily  pantoprazole    Tablet 40 milliGRAM(s) Oral before breakfast  senna 2 Tablet(s) Oral at bedtime  sodium chloride 0.9%. 1000 milliLiter(s) (250 mL/Hr) IV Continuous <Continuous>    MEDICATIONS  (PRN):      Labs:                        16.8   7.84  )-----------( 190      ( 14 Jul 2020 08:52 )             50.5     07-14    141  |  101  |  17  ----------------------------<  117<H>  4.1   |  23  |  0.8    Ca    10.2<H>      14 Jul 2020 08:52    TPro  7.4  /  Alb  4.4  /  TBili  0.6  /  DBili  x   /  AST  22  /  ALT  24  /  AlkPhos  60  07-13    PT/INR - ( 14 Jul 2020 08:52 )   PT: 11.20 sec;   INR: 0.97 ratio         PTT - ( 14 Jul 2020 08:52 )  PTT:32.4 sec  Covid: COVID-19 PCR: Anthony (07-13-20 @ 16:00)      Blood Type:   HGB A1C:   Prealbumin:   Pro-BNP: Serum Pro-Brain Natriuretic Peptide: 34 pg/mL (07-13 @ 11:20)    Thyroid Panel:   MRSA:  / MSSA:       CXR: < from: Xray Chest 2 Views PA/Lat (07.13.20 @ 12:51) >  Impression:      No radiographic evidence of acute cardiopulmonary disease.    < end of copied text >      EKG: < from: 12 Lead ECG (07.13.20 @ 11:37) >  Ventricular Rate 114 BPM    Atrial Rate 114 BPM    P-R Interval 180 ms    QRS Duration 88 ms    Q-T Interval 340 ms    QTC Calculation(Bezet) 468 ms    P Axis 19 degrees    R Axis -58 degrees    T Axis 30 degrees    Diagnosis Line Sinus tachycardia  Low voltage QRS  Left anterior fascicular block  Inferior infarct , age undetermined  Possible Anterolateral infarct , age undetermined  Abnormal ECG    < end of copied text >      Carotid Duplex:  prelim right 20-30%; left 40-59%    PFT's:  FEV1 73%    Echocardiogram: < from: Transthoracic Echocardiogram (07.14.20 @ 14:53) >  Summary:   1. Normal global left ventricular systolic function.   2. LV Ejection Fraction by Sousa's Method with a biplane EF of 59 %.   3. Mild asymmetric left ventricular hypertrophy involving the septal wall.   4. Mildly increased LV wall thickness.   5. Mild late systolic dynamic gradient c/w LVOT obstruction of 13 mm. Hg.   6. Mild anterior leaflet systolic anterior motion of the mitral valve.   7. LA volume Index is 11.2 ml/m² ml/m2.    PHYSICIAN INTERPRETATION:  Left Ventricle: The left ventricular internal cavity size is normal. Left ventricular wall thickness is mildly increased. There is mild asymmetric left ventricular hypertrophy involving the septal wall. Global LV systolic function was normal. Normal segmental left ventricular systolic function. Spectral Doppler shows normal pattern of LV diastolic filling. Normal LV filling pressures. Mild late systolic dynamic gradient c/w LVOT obstruction of 13 mm. Hg.       LV Wall Scoring:  All segments are normal.    Right Ventricle: Normal right ventricular size and function.  Left Atrium: Normal left atrial size. LA volume Index is 11.2 ml/m² ml/m2.  Right Atrium: Normal right atrial size.  Pericardium: There is no evidence of pericardial effusion.  Mitral Valve: Structurally normal mitral valve, with normal leaflet excursion. The mitral valve is normal in structure. Mild systolic anterior motion of the anterior leaflet of the mitral valve. No evidence of mitral valve regurgitation is seen.  Tricuspid Valve: Structurally normal tricuspid valve, with normal leaflet excursion. The tricuspid valve is normal in structure. Trivial tricuspid regurgitation is visualized.  Aortic Valve: Normal trileaflet aortic valve with normal opening. The aortic valve is normal. No evidence of aortic valve regurgitation is seen.  Pulmonic Valve: Structurally normal pulmonic valve, with normal leaflet excursion. The pulmonic valve is normal. No indication of pulmonic valve regurgitation.  Aorta: The aortic root and ascending aorta are structurally normal, with no evidence of dilitation.  Pulmonary Artery: The main pulmonary artery is normal in size.  Venous: The inferior vena cava was normal sized, with respiratory size variation less than 50%.       < end of copied text >      Cardiac catheterization: Coronary circulation: There was significant 3-vessel coronary artery disease (LAD, RCA, and circumflex). Left main: The vessel was large sized and calcified. Angiography showed severe atherosclerosis. There was a tubular 80 % stenosis in the distal third of the vessel segment. LAD: The vessel was large sized and calcified. Angiography showed severe atherosclerosis. Proximal LAD: There was a tubular 90 % stenosis at the origin of D1. There was FAMILIA grade 3 flow through the vessel (brisk flow). Distal LAD: There was a tubular 90 % stenosis at a site with no prior intervention. There was FAMILIA grade 3 flow through the vessel (brisk flow). 1st diagonal: There was a discrete 90 % stenosis at the ostium of the vessel segment. There was FAMILIA grade 3 flow through the vessel (brisk flow). Circumflex: The vessel was medium sized. Angiography showed severe atherosclerosis. There was a tubular 90 % stenosis at the ostium of the vessel segment. There was FAMILIA grade 3 flow through the vessel (brisk flow). 1st obtuse marginal: The vessel was small to medium sized. Angiography showed severe atherosclerosis. There was a diffuse 80 % stenosis in the proximal third of the vessel segment. There was FAMILIA grade 3 flow through the vessel (brisk flow). RCA: The vessel was large sized (dominant). Angiography showed severe atherosclerosis. Right PDA: The vessel was medium sized. Angiography showed mild atherosclerosis with no flow limiting lesions. Right posterolateral segment: The vessel was medium sized. Angiography showed severe atherosclerosis. There was a diffuse 80 % stenosis at a site with no prior intervention. There was FAMILIA grade 3 flow through the vessel (brisk flow).     Vital Signs Last 24 Hrs  T(C): 36.5 (14 Jul 2020 15:15), Max: 37.2 (13 Jul 2020 23:15)  T(F): 97.7 (14 Jul 2020 14:45), Max: 98.9 (13 Jul 2020 23:15)  HR: 95 (14 Jul 2020 17:00) (78 - 107)  BP: 112/62 (14 Jul 2020 17:00) (107/57 - 176/90)  BP(mean): 81 (14 Jul 2020 17:00) (73 - 98)  RR: 20 (14 Jul 2020 17:00) (12 - 33)  SpO2: 96% (14 Jul 2020 17:00) (90% - 98%)  right arm bp:  left arm bp:    5 meter: 5,5,5    Gen: WN/WD NAD  Neuro: A&Ox3, nonfocal  Pulm: CTA B/L  CV: RRR, S1S2    Abd: Soft, NT, ND +BS  Ext: No edema, + peripheral pulses    Cardic Surgery Risk Factors  CVA and/or carotid/cerebrovascular disease. Yes  No  Explain if Yes left carotid stenosis 40-59%  Aortoiliac disease Yes  No  Explain if Yes  Previous MI Yes  No  Explain if Yes  Previous Cardiac Surgery Yes  No  Explain if Yes  Hemodynamics-Unstable or Shock Yes  No  Explain if Yes  Diabetes Yes  No  Explain if Yes  Hepatic Failure Yes  No  Explain if Yes  Renal failure and/or dialysis Yes  No  Explain if Yes  Heart failure-type-present or past Yes  No  Explain if Yes  COPD Yes  No  Explain if Yes  FEV1 73%  Immune System Deficiency Yes  No  Explain if Yes  Malignant Ventricular Arrhythmia Yes  No  Explain if Yes    STS Score:   Risk of Mortality:  0.816%  Renal Failure:  0.714%  Permanent Stroke:  0.733%  Prolonged Ventilation:  4.939%  DSW Infection:  0.285%  Reoperation:  1.360%  Morbidity or Mortality:  7.708%  Short Length of Stay:  52.968%  Long Length of Stay:  2.469%      Pt has AICD/PPM [ ] Yes  [x ] No             Brand Name:  Pre-op Beta Blocker ordered within 24 hrs of surgery (CABG ONLY)?  [x ] Yes  [ ] No  If not, Why?  Type & Cross  [ x] Yes  [ ] No  NPO after Midnight [x ] Yes  [ ] No  Pre-op ABX ordered  [ ] Yes  [ x] No     Hibiclens/Peridex ordered [x ] Yes  [ ] No  Intraop on Hold: PRBCs, CXR, JOSE DE JESUS [x ]   Consent obtained  [x ] Yes  [ ] No

## 2020-07-14 NOTE — PROGRESS NOTE ADULT - SUBJECTIVE AND OBJECTIVE BOX
SUBJECTIVE:    Patient is a 69y old Male who presents with a chief complaint of chest pain (13 Jul 2020 18:43)    A month ago patient went to his cardiologist for an echocardiogram and stress test for which he was told he had "a possible blockage." He was scheduled for elective cardiac catheterization with Dr. Fernandes on 7/23/2020. On the day of presentation, patient says he developed back pain behind his left shoulder. He said that the pain was different from the chronic back pain that he usually feels. He described the pain as 6/10 intensity, without radiation, but associated with chest tightness and nausea. By the time the patient arrived at the hospital, his chest pain had diminished to a 3-4/10 intensity and the nausea had resolved.    Today is hospital day 1d. This morning he is resting comfortably in bed and reports no new issues or overnight events. Patient says the chest pain is now 1/10 intensity. Patient has no other complaints besides some back spasms that affected his sleep. ROS otherwise negative    PAST MEDICAL & SURGICAL HISTORY  BPH (benign prostatic hyperplasia)  GERD (gastroesophageal reflux disease)  Back pain  HTN (hypertension)  Gout  Renal cancer, left: Renal cell carcinoma s/p partial left nephrectomy (20% removed)  Bladder polyp: S/p removal 4/4/2019 by Dr. Wylie  H/O spinal fusion: L4/5-L5/S1 6/23/2008 by Dr. Jara with revision of fusion Transome Axial JF 3/1/2020 by Dr. Jara  H/O laminectomy: L3/L4 9/11/2002 by Dr. Marek Alonso's tumor: Removed 2/3/2006 by Dr. Prieto  H/O cystoscopy: TURP 7/27/2005 by Dr. Mendez  H/O partial nephrectomy: 9/17/2009 by Dr. Vergara    SOCIAL HISTORY:    ALLERGIES:  adhesives (Rash)  Keflex (Diarrhea)  methylPREDNISolone (Pruritus)  rash (Rash)    MEDICATIONS:  STANDING MEDICATIONS  allopurinol 300 milliGRAM(s) Oral daily  aspirin enteric coated 81 milliGRAM(s) Oral daily  atorvastatin 80 milliGRAM(s) Oral at bedtime  chlorhexidine 4% Liquid 1 Application(s) Topical <User Schedule>  hydrochlorothiazide 12.5 milliGRAM(s) Oral daily  lactated ringers. 500 milliLiter(s) IV Continuous <Continuous>  losartan 50 milliGRAM(s) Oral daily  metoprolol tartrate 50 milliGRAM(s) Oral two times a day  multivitamin 1 Tablet(s) Oral daily  omega-3-Acid Ethyl Esters 2 Gram(s) Oral two times a day  pantoprazole    Tablet 40 milliGRAM(s) Oral before breakfast  senna 2 Tablet(s) Oral at bedtime  sodium chloride 0.9% Bolus 350 milliLiter(s) IV Bolus once    PRN MEDICATIONS    VITALS:   T(F): 96.5  HR: 107  BP: 171/76  RR: 18  SpO2: 96%    LABS:                        16.8   7.84  )-----------( 190      ( 14 Jul 2020 08:52 )             50.5     07-14    141  |  101  |  17  ----------------------------<  117<H>  4.1   |  23  |  0.8    Ca    10.2<H>      14 Jul 2020 08:52    TPro  7.4  /  Alb  4.4  /  TBili  0.6  /  DBili  x   /  AST  22  /  ALT  24  /  AlkPhos  60  07-13    PT/INR - ( 14 Jul 2020 08:52 )   PT: 11.20 sec;   INR: 0.97 ratio    PTT - ( 14 Jul 2020 08:52 )  PTT:32.4 sec    Troponin T, Serum: <0.01 ng/mL (07-13-20 @ 11:20)  Serum Pro-Brain Natriuretic Peptide: 34 pg/mL (07-13-20 @ 11:20)    RADIOLOGY:  Xray Chest 2 Views PA/Lat (07.13.20 @ 12:51)  Impression:    1. No radiographic evidence of acute cardiopulmonary disease.  2. Degenerative changes of the mid lower thoracic spine.    PHYSICAL EXAM:  GENERAL: NAD  HEENT:  NCAT, EOMI, PERRL, conjunctiva clear, Moist mucous membranes  NECK: Supple, No JVD, Normal thyroid  NERVOUS SYSTEM: AAOx3, Good concentration; Motor Strength 5/5 B/L upper and lower extremities  CHEST/LUNG: CTA b/l, no w/r/r  HEART: +s1s2 RRR no m/g/r  ABDOMEN: soft, NT/ND, (+) bs  EXTREMITIES:  2+ Peripheral Pulses, No c/c/e  LYMPH: No lymphadenopathy noted  SKIN: No rashes or lesions

## 2020-07-14 NOTE — PRE-ANESTHESIA EVALUATION ADULT - NSRADCARDRESULTSFT_GEN_ALL_CORE
ECHO July 14 2020  Summary:   1. Normal global left ventricular systolic function.   2. LV Ejection Fraction by Sousa's Method with a biplane EF of 59 %.   3. Mild asymmetric left ventricular hypertrophy involving the septal wall.   4. Mildly increased LV wall thickness.   5. Mild late systolic dynamic gradient c/w LVOT obstruction of 13 mm. Hg.   6. Mild anterior leaflet systolic anterior motion of the mitral valve.   7. LA volume Index is 11.2 ml/m² ml/m2.

## 2020-07-14 NOTE — CHART NOTE - NSCHARTNOTEFT_GEN_A_CORE
PRE-OP DIAGNOSIS: suspected CAD, abnormal stress test    PROCEDURE:  [x] C with coronary angiography                           [ ] C  Physician: Dr Woodruff  Assistant: Marta    ANESTHESIA TYPE:  [  ]General Anesthesia  [x] Sedation  [x] Local/Regional    ESTIMATED BLOOD LOSS:    10   mL    CONDITION  [  ] Critical  [  ] Serious  [  ]Fair  [ x]Good      SPECIMENS REMOVED (IF APPLICABLE):      IV CONTRAST:        50     mL      ACCESS:    [x] right radial artery  [ ] right femoral artery    CLOSURE: D-STAT     LEFT HEART CATHETERIZATION:                                    Hemodynamics: Hemodynamic assessment demonstrates no systemic hypertension.   Ventricles: Global left ventricular function was normal. EF estimated was 55 %.   Valves: Aortic valve: No significant aortic gradient.     Coronary circulation: There was significant 3-vessel coronary artery disease (LAD, RCA, and circumflex). Left main: The vessel was large sized and calcified. Angiography showed severe atherosclerosis. There was a tubular 80 % stenosis in the distal third of the vessel segment. LAD: The vessel was large sized and calcified. Angiography showed severe atherosclerosis. Proximal LAD: There was a tubular 90 % stenosis at the origin of D1. There was FAMILIA grade 3 flow through the vessel (brisk flow). Distal LAD: There was a tubular 90 % stenosis at a site with no prior intervention. There was FAMILIA grade 3 flow through the vessel (brisk flow). 1st diagonal: There was a discrete 90 % stenosis at the ostium of the vessel segment. There was FAMILIA grade 3 flow through the vessel (brisk flow). Circumflex: The vessel was medium sized. Angiography showed severe atherosclerosis. There was a tubular 90 % stenosis at the ostium of the vessel segment. There was FAMILIA grade 3 flow through the vessel (brisk flow). 1st obtuse marginal: The vessel was small to medium sized. Angiography showed severe atherosclerosis. There was a diffuse 80 % stenosis in the proximal third of the vessel segment. There was FAMILIA grade 3 flow through the vessel (brisk flow). RCA: The vessel was large sized (dominant). Angiography showed severe atherosclerosis. Right PDA: The vessel was medium sized. Angiography showed mild atherosclerosis with no flow limiting lesions. Right posterolateral segment: The vessel was medium sized. Angiography showed severe atherosclerosis. There was a diffuse 80 % stenosis at a site with no prior intervention. There was FAMILIA grade 3 flow through the vessel (brisk flow).     INTERVENTION: none  IMPLANTS: none      POST-OP DIAGNOSIS  Significant triple vessel disease   SYNTHAX 2 score PCI 29%, CABG 30.8%        PLAN OF CARE  [x] CT Surgery consult called for CABG evaluation  [x] Continue ASA, B-blocker & Statin therapy

## 2020-07-14 NOTE — CHART NOTE - NSCHARTNOTEFT_GEN_A_CORE
PREOPERATIVE DAY OF PROCEDURE EVALUATION:  I have personally seen and examined the patient.  I agree with the history and physical which I have reviewed and noted any changes below.  (Signed electronically by _____Dr Woodruff_____)  07-14-20 @ 10:45

## 2020-07-15 ENCOUNTER — TRANSCRIPTION ENCOUNTER (OUTPATIENT)
Age: 70
End: 2020-07-15

## 2020-07-15 LAB
A1C WITH ESTIMATED AVERAGE GLUCOSE RESULT: 6 % — HIGH (ref 4–5.6)
ALBUMIN SERPL ELPH-MCNC: 4.3 G/DL — SIGNIFICANT CHANGE UP (ref 3.5–5.2)
ALP SERPL-CCNC: 31 U/L — SIGNIFICANT CHANGE UP (ref 30–115)
ALT FLD-CCNC: 41 U/L — SIGNIFICANT CHANGE UP (ref 0–41)
ANION GAP SERPL CALC-SCNC: 14 MMOL/L — SIGNIFICANT CHANGE UP (ref 7–14)
ANION GAP SERPL CALC-SCNC: 17 MMOL/L — HIGH (ref 7–14)
APTT BLD: 24.5 SEC — LOW (ref 27–39.2)
APTT BLD: 28.1 SEC — SIGNIFICANT CHANGE UP (ref 27–39.2)
AST SERPL-CCNC: 52 U/L — HIGH (ref 0–41)
BILIRUB SERPL-MCNC: 1.6 MG/DL — HIGH (ref 0.2–1.2)
BLD GP AB SCN SERPL QL: SIGNIFICANT CHANGE UP
BUN SERPL-MCNC: 15 MG/DL — SIGNIFICANT CHANGE UP (ref 10–20)
BUN SERPL-MCNC: 17 MG/DL — SIGNIFICANT CHANGE UP (ref 10–20)
CALCIUM SERPL-MCNC: 7.9 MG/DL — LOW (ref 8.5–10.1)
CALCIUM SERPL-MCNC: 9.7 MG/DL — SIGNIFICANT CHANGE UP (ref 8.5–10.1)
CHLORIDE SERPL-SCNC: 100 MMOL/L — SIGNIFICANT CHANGE UP (ref 98–110)
CHLORIDE SERPL-SCNC: 105 MMOL/L — SIGNIFICANT CHANGE UP (ref 98–110)
CO2 SERPL-SCNC: 21 MMOL/L — SIGNIFICANT CHANGE UP (ref 17–32)
CO2 SERPL-SCNC: 23 MMOL/L — SIGNIFICANT CHANGE UP (ref 17–32)
CREAT SERPL-MCNC: 0.9 MG/DL — SIGNIFICANT CHANGE UP (ref 0.7–1.5)
CREAT SERPL-MCNC: 1 MG/DL — SIGNIFICANT CHANGE UP (ref 0.7–1.5)
ESTIMATED AVERAGE GLUCOSE: 126 MG/DL — HIGH (ref 68–114)
GAS PNL BLDA: SIGNIFICANT CHANGE UP
GLUCOSE BLDC GLUCOMTR-MCNC: 118 MG/DL — HIGH (ref 70–99)
GLUCOSE BLDC GLUCOMTR-MCNC: 91 MG/DL — SIGNIFICANT CHANGE UP (ref 70–99)
GLUCOSE SERPL-MCNC: 116 MG/DL — HIGH (ref 70–99)
GLUCOSE SERPL-MCNC: 98 MG/DL — SIGNIFICANT CHANGE UP (ref 70–99)
HCT VFR BLD CALC: 35.1 % — LOW (ref 42–52)
HCT VFR BLD CALC: 45.4 % — SIGNIFICANT CHANGE UP (ref 42–52)
HGB BLD-MCNC: 11.5 G/DL — LOW (ref 14–18)
HGB BLD-MCNC: 15 G/DL — SIGNIFICANT CHANGE UP (ref 14–18)
INR BLD: 1.22 RATIO — SIGNIFICANT CHANGE UP (ref 0.65–1.3)
MAGNESIUM SERPL-MCNC: 2.6 MG/DL — HIGH (ref 1.8–2.4)
MCHC RBC-ENTMCNC: 31.5 PG — HIGH (ref 27–31)
MCHC RBC-ENTMCNC: 32 PG — HIGH (ref 27–31)
MCHC RBC-ENTMCNC: 32.8 G/DL — SIGNIFICANT CHANGE UP (ref 32–37)
MCHC RBC-ENTMCNC: 33 G/DL — SIGNIFICANT CHANGE UP (ref 32–37)
MCV RBC AUTO: 95.4 FL — HIGH (ref 80–94)
MCV RBC AUTO: 97.8 FL — HIGH (ref 80–94)
MRSA PCR RESULT.: POSITIVE
NRBC # BLD: 0 /100 WBCS — SIGNIFICANT CHANGE UP (ref 0–0)
NRBC # BLD: 0 /100 WBCS — SIGNIFICANT CHANGE UP (ref 0–0)
PLATELET # BLD AUTO: 119 K/UL — LOW (ref 130–400)
PLATELET # BLD AUTO: 191 K/UL — SIGNIFICANT CHANGE UP (ref 130–400)
POTASSIUM SERPL-MCNC: 4.1 MMOL/L — SIGNIFICANT CHANGE UP (ref 3.5–5)
POTASSIUM SERPL-MCNC: 4.3 MMOL/L — SIGNIFICANT CHANGE UP (ref 3.5–5)
POTASSIUM SERPL-SCNC: 4.1 MMOL/L — SIGNIFICANT CHANGE UP (ref 3.5–5)
POTASSIUM SERPL-SCNC: 4.3 MMOL/L — SIGNIFICANT CHANGE UP (ref 3.5–5)
PROT SERPL-MCNC: 5.5 G/DL — LOW (ref 6–8)
PROTHROM AB SERPL-ACNC: 14 SEC — HIGH (ref 9.95–12.87)
RBC # BLD: 3.59 M/UL — LOW (ref 4.7–6.1)
RBC # BLD: 4.76 M/UL — SIGNIFICANT CHANGE UP (ref 4.7–6.1)
RBC # FLD: 14.2 % — SIGNIFICANT CHANGE UP (ref 11.5–14.5)
RBC # FLD: 14.6 % — HIGH (ref 11.5–14.5)
SODIUM SERPL-SCNC: 140 MMOL/L — SIGNIFICANT CHANGE UP (ref 135–146)
SODIUM SERPL-SCNC: 140 MMOL/L — SIGNIFICANT CHANGE UP (ref 135–146)
WBC # BLD: 16.03 K/UL — HIGH (ref 4.8–10.8)
WBC # BLD: 7.95 K/UL — SIGNIFICANT CHANGE UP (ref 4.8–10.8)
WBC # FLD AUTO: 16.03 K/UL — HIGH (ref 4.8–10.8)
WBC # FLD AUTO: 7.95 K/UL — SIGNIFICANT CHANGE UP (ref 4.8–10.8)

## 2020-07-15 PROCEDURE — 33508 ENDOSCOPIC VEIN HARVEST: CPT | Mod: 80

## 2020-07-15 PROCEDURE — 33508 ENDOSCOPIC VEIN HARVEST: CPT

## 2020-07-15 PROCEDURE — 71045 X-RAY EXAM CHEST 1 VIEW: CPT | Mod: 26,77

## 2020-07-15 PROCEDURE — 33533 CABG ARTERIAL SINGLE: CPT | Mod: 80

## 2020-07-15 PROCEDURE — 33521 CABG ARTERY-VEIN FOUR: CPT

## 2020-07-15 PROCEDURE — 93010 ELECTROCARDIOGRAM REPORT: CPT

## 2020-07-15 PROCEDURE — 33533 CABG ARTERIAL SINGLE: CPT

## 2020-07-15 PROCEDURE — 99291 CRITICAL CARE FIRST HOUR: CPT

## 2020-07-15 PROCEDURE — 71045 X-RAY EXAM CHEST 1 VIEW: CPT | Mod: 26

## 2020-07-15 PROCEDURE — 33521 CABG ARTERY-VEIN FOUR: CPT | Mod: 80

## 2020-07-15 RX ORDER — IPRATROPIUM BROMIDE 0.2 MG/ML
2 SOLUTION, NON-ORAL INHALATION EVERY 6 HOURS
Refills: 0 | Status: DISCONTINUED | OUTPATIENT
Start: 2020-07-15 | End: 2020-07-16

## 2020-07-15 RX ORDER — MUPIROCIN 20 MG/G
1 OINTMENT TOPICAL EVERY 12 HOURS
Refills: 0 | Status: COMPLETED | OUTPATIENT
Start: 2020-07-15 | End: 2020-07-20

## 2020-07-15 RX ORDER — VASOPRESSIN 20 [USP'U]/ML
0.04 INJECTION INTRAVENOUS
Qty: 50 | Refills: 0 | Status: DISCONTINUED | OUTPATIENT
Start: 2020-07-15 | End: 2020-07-16

## 2020-07-15 RX ORDER — DEXTROSE 50 % IN WATER 50 %
50 SYRINGE (ML) INTRAVENOUS
Refills: 0 | Status: DISCONTINUED | OUTPATIENT
Start: 2020-07-15 | End: 2020-07-19

## 2020-07-15 RX ORDER — PROPOFOL 10 MG/ML
30 INJECTION, EMULSION INTRAVENOUS
Qty: 1000 | Refills: 0 | Status: DISCONTINUED | OUTPATIENT
Start: 2020-07-15 | End: 2020-07-16

## 2020-07-15 RX ORDER — DEXMEDETOMIDINE HYDROCHLORIDE IN 0.9% SODIUM CHLORIDE 4 UG/ML
0.1 INJECTION INTRAVENOUS
Qty: 200 | Refills: 0 | Status: DISCONTINUED | OUTPATIENT
Start: 2020-07-15 | End: 2020-07-16

## 2020-07-15 RX ORDER — NITROGLYCERIN 6.5 MG
30 CAPSULE, EXTENDED RELEASE ORAL
Qty: 50 | Refills: 0 | Status: DISCONTINUED | OUTPATIENT
Start: 2020-07-15 | End: 2020-07-16

## 2020-07-15 RX ORDER — NOREPINEPHRINE BITARTRATE/D5W 8 MG/250ML
0.05 PLASTIC BAG, INJECTION (ML) INTRAVENOUS
Qty: 8 | Refills: 0 | Status: DISCONTINUED | OUTPATIENT
Start: 2020-07-15 | End: 2020-07-16

## 2020-07-15 RX ORDER — ALBUTEROL 90 UG/1
2 AEROSOL, METERED ORAL EVERY 6 HOURS
Refills: 0 | Status: DISCONTINUED | OUTPATIENT
Start: 2020-07-15 | End: 2020-07-16

## 2020-07-15 RX ORDER — FAMOTIDINE 10 MG/ML
20 INJECTION INTRAVENOUS EVERY 12 HOURS
Refills: 0 | Status: DISCONTINUED | OUTPATIENT
Start: 2020-07-15 | End: 2020-07-16

## 2020-07-15 RX ORDER — SODIUM CHLORIDE 9 MG/ML
1000 INJECTION INTRAMUSCULAR; INTRAVENOUS; SUBCUTANEOUS
Refills: 0 | Status: DISCONTINUED | OUTPATIENT
Start: 2020-07-15 | End: 2020-07-16

## 2020-07-15 RX ORDER — MEPERIDINE HYDROCHLORIDE 50 MG/ML
25 INJECTION INTRAMUSCULAR; INTRAVENOUS; SUBCUTANEOUS ONCE
Refills: 0 | Status: DISCONTINUED | OUTPATIENT
Start: 2020-07-15 | End: 2020-07-16

## 2020-07-15 RX ORDER — NICARDIPINE HYDROCHLORIDE 30 MG/1
5 CAPSULE, EXTENDED RELEASE ORAL
Qty: 40 | Refills: 0 | Status: DISCONTINUED | OUTPATIENT
Start: 2020-07-15 | End: 2020-07-16

## 2020-07-15 RX ORDER — POLYETHYLENE GLYCOL 3350 17 G/17G
17 POWDER, FOR SOLUTION ORAL DAILY
Refills: 0 | Status: DISCONTINUED | OUTPATIENT
Start: 2020-07-15 | End: 2020-07-21

## 2020-07-15 RX ORDER — CEFAZOLIN SODIUM 1 G
1000 VIAL (EA) INJECTION EVERY 8 HOURS
Refills: 0 | Status: COMPLETED | OUTPATIENT
Start: 2020-07-15 | End: 2020-07-16

## 2020-07-15 RX ORDER — INSULIN HUMAN 100 [IU]/ML
10 INJECTION, SOLUTION SUBCUTANEOUS
Qty: 100 | Refills: 0 | Status: DISCONTINUED | OUTPATIENT
Start: 2020-07-15 | End: 2020-07-16

## 2020-07-15 RX ORDER — MUPIROCIN 20 MG/G
1 OINTMENT TOPICAL
Refills: 0 | Status: DISCONTINUED | OUTPATIENT
Start: 2020-07-15 | End: 2020-07-15

## 2020-07-15 RX ORDER — ALBUMIN HUMAN 25 %
1000 VIAL (ML) INTRAVENOUS ONCE
Refills: 0 | Status: DISCONTINUED | OUTPATIENT
Start: 2020-07-15 | End: 2020-07-16

## 2020-07-15 RX ORDER — OXYCODONE AND ACETAMINOPHEN 5; 325 MG/1; MG/1
2 TABLET ORAL EVERY 6 HOURS
Refills: 0 | Status: DISCONTINUED | OUTPATIENT
Start: 2020-07-15 | End: 2020-07-16

## 2020-07-15 RX ORDER — ASPIRIN/CALCIUM CARB/MAGNESIUM 324 MG
300 TABLET ORAL ONCE
Refills: 0 | Status: COMPLETED | OUTPATIENT
Start: 2020-07-15 | End: 2020-07-15

## 2020-07-15 RX ORDER — KETOROLAC TROMETHAMINE 30 MG/ML
15 SYRINGE (ML) INJECTION EVERY 4 HOURS
Refills: 0 | Status: DISCONTINUED | OUTPATIENT
Start: 2020-07-15 | End: 2020-07-18

## 2020-07-15 RX ORDER — DEXTROSE 50 % IN WATER 50 %
25 SYRINGE (ML) INTRAVENOUS
Refills: 0 | Status: DISCONTINUED | OUTPATIENT
Start: 2020-07-15 | End: 2020-07-19

## 2020-07-15 RX ORDER — OXYCODONE AND ACETAMINOPHEN 5; 325 MG/1; MG/1
1 TABLET ORAL EVERY 4 HOURS
Refills: 0 | Status: DISCONTINUED | OUTPATIENT
Start: 2020-07-15 | End: 2020-07-16

## 2020-07-15 RX ADMIN — CHLORHEXIDINE GLUCONATE 1 APPLICATION(S): 213 SOLUTION TOPICAL at 04:58

## 2020-07-15 RX ADMIN — Medication 300 MILLIGRAM(S): at 21:04

## 2020-07-15 RX ADMIN — Medication 100 MILLIGRAM(S): at 14:53

## 2020-07-15 RX ADMIN — Medication 100 MILLIGRAM(S): at 22:04

## 2020-07-15 RX ADMIN — Medication 15 MILLIGRAM(S): at 23:33

## 2020-07-15 RX ADMIN — Medication 15 MILLIGRAM(S): at 22:18

## 2020-07-15 RX ADMIN — FAMOTIDINE 20 MILLIGRAM(S): 10 INJECTION INTRAVENOUS at 18:06

## 2020-07-15 NOTE — DISCHARGE NOTE PROVIDER - NSDCFUSCHEDAPPT_GEN_ALL_CORE_FT
EUSEBIO MORENO ; 07/23/2020 ; [facility]  EUSEBIO MORENO ; 08/06/2020 ; [facility] [address] [place] [address] EUSEBIO MORENO ; 07/23/2020 ; [facility]  EUSEBIO MORENO ; 07/28/2020 ; [facility] [place] [address] [facility]  EUSEBIO MORENO ; 08/06/2020 ; [facility] [address] [facility]

## 2020-07-15 NOTE — DISCHARGE NOTE NURSING/CASE MANAGEMENT/SOCIAL WORK - PATIENT PORTAL LINK FT
You can access the FollowMyHealth Patient Portal offered by [facility] by registering at the following website: [email or web address]. By joining [employer]’s FollowMyHealth portal, you will also be able to view your health information using other applications (apps) compatible with our system.

## 2020-07-15 NOTE — DISCHARGE NOTE PROVIDER - PROVIDER TOKENS
PROVIDER:[TOKEN:[[id number]]],PROVIDER:[TOKEN:[[id number]]] PROVIDER:[TOKEN:[[id number]],SCHEDULEDAPPT:[07/28/2020],SCHEDULEDAPPTTIME:[02:00 PM]],PROVIDER:[TOKEN:[[id number]]]

## 2020-07-15 NOTE — DISCHARGE NOTE PROVIDER - HOSPITAL COURSE
This is 68y/o male with PMH of hypertension, chronic back pain, BPH, GERD, gout, and renal cell carcinoma s/p partial left nephrectomy (9/17/2009) presented to ED with complaints of nonradiating chest pressure and back pain at rest. He ruled for AMI via cardiac enzymes. He had an abnormal stress test few months ago and was scheduled for elective cath. He underwent a cath on this admission which revealed severe triple vessel disease. On 07/15/20, he underwent myocardial revascularization. This is 69 year-old male with a PMHx of hypertension, chronic back pain, BPH, GERD, gout, and renal cell carcinoma status post partial left nephrectomy (9/17/2009) presented to ED with complaints of non-radiating chest pressure and back pain at rest. The patient ruled-out for AMI via cardiac enzymes. He had an abnormal stress test few months ago and was scheduled for elective cath. He underwent a cardiac catheterization on this admission which revealed severe triple vessel disease. On 07/15/20 the patient underwent myocardial revascularization.  Post-operatively the patient had developed atrial fibrillation and was pharmacologically converted to NSR.  The patient otherwisse had an uneventful post-op course and was discharged home in stable condition on PO amiodarone.

## 2020-07-15 NOTE — DISCHARGE NOTE PROVIDER - NSDCQMSTROKE_NEU_ALL_CORE
General Surgery Week 2 Survey      Responses   Facility patient discharged from?  [facility]   Does the patient have one of the following disease processes/diagnoses(primary or secondary)?  General Surgery   Week 2 attempt successful?  No   Unsuccessful attempts  Attempt 1          Luz Cates RN   No

## 2020-07-15 NOTE — PROGRESS NOTE ADULT - SUBJECTIVE AND OBJECTIVE BOX
CTU Attending Progress Daily Note     15 Jul 2020 16:36  POD#     0          Procedure:  CABG    Patient seen as post-op critical care follow-up    HPI:  [69y man]    CC: chest pain    PMH: hypertension, chronic back pain, BPH, GERD, gout, and renal cell carcinoma s/p partial left nephrectomy (9/17/2009)    PSH: Bladder polyp S/p removal (4/4/2019 by Dr. Wylie), TURP (7/27/2005 by Dr. Mendez), laminectomy of L3/L4 (9/11/2002 by Dr. Jara), partial left nephrectomy (9/17/2009 by Dr. Vergara), spinal fusion (L4/5-L5/S1 6/23/2008 by Dr. Jara) with revision of fusion Transome Axial JF (3/1/2020 by Dr. 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 The patient was told the results were abnormal and that he had "a possible blockage." He was scheduled for elective cardiac catheterization with Dr. Fernandes on 7/23/2020. On the day of presentation, the patient had finished eat breakfast ("a bowl of Cheerios for the heart") and was sitting down talking to his wife at home when he began developing back pain behind his left shoulder. He said that the pain was different from the chronic back pain that he usually feels. The pain was 6/10 intensity that did not radiate and was accompanied with chest tightness and nausea. He decided to come to ED, worried that something was wrong. By the time the patient arrived at the hospital, his chest pain had diminished to a 3-4/10 intensity and the nausea had resolved.    In the ED, vital signs were Tmax 98.7F, , /91, RR 16, SpO2 99% on room air. Patient was given aspirin 162mg PO x1 dose. On sign out, ED mentioned possible Q waves on ECG but I did not appreciated them. ECG noted for left anterior fascicular block. Troponin negative x1. Per ED, patient is scheduled in AM for cardiac catheterization with Dr. Fernandes. When I spoke to the patient, the pain had mostly resolved and was now a 1/10. (13 Jul 2020 18:43)    See preop testing chart H&P    Interval event for past 24 hr:  EUSEBIO MORENO  69y had cardiac surgery as above      REVIEW OF SYSTEMS:    [x  ] Unable to assess ROS because : intubated, sedated    OBJECTIVE:  ICU Vital Signs Last 24 Hrs  T(C): 37 (15 Jul 2020 14:06), Max: 37 (15 Jul 2020 14:06)  T(F): 98.6 (15 Jul 2020 14:06), Max: 98.6 (15 Jul 2020 14:06)  HR: 89 (15 Jul 2020 16:15) (75 - 101)  BP: 98/54 (15 Jul 2020 16:15) (74/41 - 143/67)  BP(mean): 70 (15 Jul 2020 16:15) (53 - 98)  ABP: 101/53 (15 Jul 2020 16:15) (83/50 - 131/67)  ABP(mean): 70 (15 Jul 2020 16:15) (62 - 92)  RR: 13 (15 Jul 2020 16:15) (10 - 34)  SpO2: 97% (15 Jul 2020 16:15) (93% - 100%)      I&O's Summary    14 Jul 2020 07:01  -  15 Jul 2020 07:00  --------------------------------------------------------  IN: 2340 mL / OUT: 800 mL / NET: 1540 mL    15 Jul 2020 07:01  -  15 Jul 2020 16:36  --------------------------------------------------------  IN: 1113.8 mL / OUT: 633 mL / NET: 480.8 mL      Adult Advanced Hemodynamics Last 24 Hrs  CVP(mm Hg): 11 (15 Jul 2020 16:15) (3 - 26)  CVP(cm H2O): --  CO: 7.7 (15 Jul 2020 15:30) (5.7 - 7.7)  CI: 3.4 (15 Jul 2020 15:30) (2.5 - 3.4)  PA: 30/14 (15 Jul 2020 16:15) (22/7 - 36/21)  PA(mean): 21 (15 Jul 2020 16:15) (14 - 28)  PCWP: --  SVR: 601 (15 Jul 2020 15:30) (601 - 714)  SVRI: 1363 (15 Jul 2020 15:30) (1363 - 1629)  PVR: --  PVRI: --  Mode: AC/ CMV (Assist Control/ Continuous Mandatory Ventilation)  RR (machine): 12  TV (machine): 620  FiO2: 40  PEEP: 50  ITime: 1  MAP: 11  PIP: 22      PHYSICAL EXAM:  General: WN/WD NAD    HEENT:     [+] NCAT  [+] EOMI  [-] Conjuctival edema   [-] Icterus   [-] Thrush   [+] ETT  [+] NGT/OGT    Neck:         [+] FROM   [-] JVD     [-] Nodes     [-] Masses    [+] Mid-line trachea    [-] Tracheostomy    Chest:         [-] Sternal click   [-] Sternal drainage   [+] Pacing wires   [+] Chest tubes   [-] SubQ emphysema    Lungs:          [+] CTA   [-] Rhonchi   [-] Rales    [-] Wheezing    [-] Decreased BS    [-] Dullness R L    Cardiac:       [+] S1 [+] S2    [+] RRR   [-] Irregular   [-] S3   [-] S4    [-] Murmurs    [-] Rub    Abdomen:    [+] BS    [+] Soft    [+] Non-tender     [-] Distended    [-] Organomegaly  [-] PEG    Extremities:   [-] Cyanosis U/L   [-] Clubbing  U/L  [-] LE/UE Edema   [+] Capillary refill    [+] Pulses     Neuro:           [+] Sedated       Skin:        [-] Rashes    [-] Erythema   [+] Normal incisions   [+] IV sites intact   [-] Sacral decubitus    Tubes: chest  LINES: central    CAPILLARY BLOOD GLUCOSE      POCT Blood Glucose.: 91 mg/dL (14 Jul 2020 22:45)    CAPILLARY BLOOD GLUCOSE      POCT Blood Glucose.: 91 mg/dL (14 Jul 2020 22:45)      HOSPITAL MEDICATIONS:  MEDICATIONS  (STANDING):  albumin human  5% IVPB 1000 milliLiter(s) IV Intermittent once  ALBUTerol    90 MICROgram(s) HFA Inhaler 2 Puff(s) Inhalation every 6 hours  aspirin Suppository 300 milliGRAM(s) Rectal once  ceFAZolin   IVPB 1000 milliGRAM(s) IV Intermittent every 8 hours  dexMEDEtomidine Infusion 0.1 MICROgram(s)/kG/Hr (2.88 mL/Hr) IV Continuous <Continuous>  dextrose 50% Injectable 50 milliLiter(s) IV Push every 15 minutes  dextrose 50% Injectable 25 milliLiter(s) IV Push every 15 minutes  famotidine Injectable 20 milliGRAM(s) IV Push every 12 hours  insulin regular Infusion 10 Unit(s)/Hr (10 mL/Hr) IV Continuous <Continuous>  ipratropium 17 MICROgram(s) HFA Inhaler 2 Puff(s) Inhalation every 6 hours  meperidine     Injectable 25 milliGRAM(s) IV Push once  mupirocin 2% Nasal 1 Application(s) Nasal two times a day  niCARdipine Infusion 5 mG/Hr (25 mL/Hr) IV Continuous <Continuous>  nitroglycerin  Infusion 30 MICROgram(s)/Min (9 mL/Hr) IV Continuous <Continuous>  norepinephrine Infusion 0.05 MICROgram(s)/kG/Min (10.8 mL/Hr) IV Continuous <Continuous>  polyethylene glycol 3350 17 Gram(s) Oral daily  propofol Infusion 30 MICROgram(s)/kG/Min (20.7 mL/Hr) IV Continuous <Continuous>  sodium chloride 0.9%. 1000 milliLiter(s) (250 mL/Hr) IV Continuous <Continuous>  sodium chloride 0.9%. 1000 milliLiter(s) (20 mL/Hr) IV Continuous <Continuous>  vasopressin Infusion 0.04 Unit(s)/Min (2.4 mL/Hr) IV Continuous <Continuous>    MEDICATIONS  (PRN):  oxycodone    5 mG/acetaminophen 325 mG 1 Tablet(s) Oral every 4 hours PRN Mild Pain (1 - 3)  oxycodone    5 mG/acetaminophen 325 mG 2 Tablet(s) Oral every 6 hours PRN Moderate Pain (4 - 6)      LABS:  ABG - ( 15 Jul 2020 16:25 )  pH, Arterial: 7.27  pH, Blood: x     /  pCO2: 47    /  pO2: 81    / HCO3: 22    / Base Excess: -5.4  /  SaO2: 92                                      11.5   16.03 )-----------( 119      ( 15 Jul 2020 14:43 )             35.1     07-15    140  |  105  |  15  ----------------------------<  116<H>  4.3   |  21  |  0.9    Ca    7.9<L>      15 Jul 2020 14:43  Mg     2.6     07-15    TPro  5.5<L>  /  Alb  4.3  /  TBili  1.6<H>  /  DBili  x   /  AST  52<H>  /  ALT  41  /  AlkPhos  31  07-15    PT/INR - ( 15 Jul 2020 14:43 )   PT: 14.00 sec;   INR: 1.22 ratio         PTT - ( 15 Jul 2020 14:43 )  PTT:24.5 sec  Urinalysis Basic - ( 14 Jul 2020 18:00 )    Color: Yellow / Appearance: Clear / SG: >1.050 / pH: x  Gluc: x / Ketone: Negative  / Bili: Negative / Urobili: <2 mg/dL   Blood: x / Protein: 100 mg/dL / Nitrite: Negative   Leuk Esterase: Negative / RBC: 2 /HPF / WBC 2 /HPF   Sq Epi: x / Non Sq Epi: 1 /HPF / Bacteria: Negative          RADIOLOGY:  Reviewed and interpreted by me  CXR from 07-15-20 shows [+] mild congestion, [-] pneumothorax, [-] R/L effusion, [-] cardiomegaly,   NGT in place, S-G Catheter in place, R/L TLC in place, R/L Chest Tubes in place    ECG:  Reviewed and interpreted by me: SR  QTC:    Assessment:  CAD SP CABG  hypotension on levophed and vasopressin  Acidosis  elevated lactate treated with IV colloids    PAST MEDICAL & SURGICAL HISTORY:  BPH (benign prostatic hyperplasia)  GERD (gastroesophageal reflux disease)  Back pain  HTN (hypertension)  Gout  Renal cancer, left: Renal cell carcinoma s/p partial left nephrectomy (20% removed)  Bladder polyp: S/p removal 4/4/2019 by Dr. Wylie  H/O spinal fusion: L4/5-L5/S1 6/23/2008 by Dr. Jara with revision of fusion Transome Axial JF 3/1/2020 by Dr. Jara  H/O laminectomy: L3/L4 9/11/2002 by Dr. Marek Alonso's tumor: Removed 2/3/2006 by Dr. Prieto  H/O cystoscopy: TURP 7/27/2005 by Dr. Mendez  H/O partial nephrectomy: 9/17/2009 by Dr. Vergara      PLAN:  Neuro: SAT,  Pain control  Pulm: SBT. Daily CXR.   Cardio: Monitor telemetry/alarms. Continue cardiac meds  GI:  Continue GI prophylaxis  Renal: monitor urine output, supplement electrolytes as needed  Vasc: Heparin SC/SCDs for DVT prophylaxis  Heme: Monitor H/H.   ID: Post-op antibiotics per protocol  Endocrine: Monitor finger stick blood sugar and control hyperglycemia with insulin  Tubes: Monitor Chest tube output      Discussed with Cardiothoracic Team at PM rounds.    45 minutes of critical care time spent providing medical care for patient's acute illness/conditions that impairs at least one vital organ system and/or poses a high risk of imminent or life threatening deterioration in the patient's condition. It includes time spent evaluating and treating the patient's acute illness as well as time spent reviewing labs, radiology, discussing goals of care with patient and/or patient's family, and discussing the case with a multidisciplinary team in an effort to prevent further life threatening deterioration or end organ damage. This time is independent of any procedures performed.

## 2020-07-15 NOTE — BRIEF OPERATIVE NOTE - NSICDXBRIEFPREOP_GEN_ALL_CORE_FT
PRE-OP DIAGNOSIS:  Triple vessel coronary artery disease 15-Jul-2020 13:36:18  [facility]  Abnormal stress ECG 15-Jul-2020 13:37:42  [facility]  Coronary artery disease with angina pectoris 15-Jul-2020 13:36:02  [facility] PRE-OP DIAGNOSIS:  Triple vessel coronary artery disease 15-Jul-2020 13:36:18  [facility]  Coronary artery disease with angina pectoris 15-Jul-2020 13:36:02  [facility]

## 2020-07-15 NOTE — DISCHARGE NOTE PROVIDER - NSDCCPCAREPLAN_GEN_ALL_CORE_FT
PRINCIPAL DISCHARGE DIAGNOSIS  Diagnosis: Chest pain  Assessment and Plan of Treatment:       SECONDARY DISCHARGE DIAGNOSES  Diagnosis: Shoulder pain  Assessment and Plan of Treatment:

## 2020-07-15 NOTE — BRIEF OPERATIVE NOTE - NSICDXBRIEFPOSTOP_GEN_ALL_CORE_FT
POST-OP DIAGNOSIS:  Coronary artery disease with angina pectoris 15-Jul-2020 13:42:02  [facility]  Abnormal stress ECG 15-Jul-2020 13:38:11  [facility]  Triple vessel coronary artery disease 15-Jul-2020 13:37:59  [facility] POST-OP DIAGNOSIS:  Coronary artery disease with angina pectoris 15-Jul-2020 13:42:02  [facility]  Triple vessel coronary artery disease 15-Jul-2020 13:37:59  [facility]

## 2020-07-15 NOTE — DISCHARGE NOTE PROVIDER - NSDCMRMEDTOKEN_GEN_ALL_CORE_FT
allopurinol 300 mg oral tablet: 1 tab(s) orally once a day  aspirin 81 mg oral tablet: 1 tab(s) orally Tuesday, Thursday, Saturday, Sunday  Colace 100 mg oral capsule: 1 cap(s) orally Monday, Wednesday, and Friday  Diovan  mg-12.5 mg oral tablet: 1 tab(s) orally once a day  Fish Oil 1200 mg oral capsule: 1 cap(s) orally 2 times a day  Flax Seed Oil oral capsule: 1200 milligram(s) orally 2 times a day  Multiple Vitamins oral tablet: 1 tab(s) orally once a day  PriLOSEC OTC 20 mg oral delayed release tablet: 1 tab(s) orally Tuesday, Thursday, Saturday, Sunday allopurinol 300 mg oral tablet: 1 tab(s) orally once a day  aspirin 325 mg oral delayed release tablet: 1 tab(s) orally once a day  atorvastatin 40 mg oral tablet: 1 tab(s) orally once a day (at bedtime)  clopidogrel 75 mg oral tablet: 1 tab(s) orally once a day  Colace 100 mg oral capsule: 1 cap(s) orally Monday, Wednesday, and Friday  Fish Oil 1200 mg oral capsule: 1 cap(s) orally 2 times a day  Flax Seed Oil oral capsule: 1200 milligram(s) orally 2 times a day  metoprolol tartrate 100 mg oral tablet: 1 tab(s) orally every 12 hours  Multiple Vitamins oral tablet: 1 tab(s) orally once a day  oxycodone-acetaminophen 5 mg-325 mg oral tablet: 2 tab(s) orally every 6 hours, As Needed -for moderate pain MDD:8   Pacerone 200 mg oral tablet: 1 tab(s) orally once a day   polyethylene glycol 3350 oral powder for reconstitution: 17 gram(s) orally once a day  PriLOSEC OTC 20 mg oral delayed release tablet: 1 tab(s) orally Tuesday, Thursday, Saturday, Sunday

## 2020-07-15 NOTE — BRIEF OPERATIVE NOTE - NSICDXBRIEFPROCEDURE_GEN_ALL_CORE_FT
PROCEDURES:  [place], vein, saphenous, endoscopic 15-Jul-2020 13:41:36  [facility]  CABG, 1 arterial and 4 venous 15-Jul-2020 13:39:11  [facility] PROCEDURES:  [place], vein, saphenous, endoscopic 15-Jul-2020 13:41:36 [place] to LAD, RSVG to Diag, Ramus Intermedius, OM and RPLV [facility]  CABG, 1 arterial and 4 venous 15-Jul-2020 13:39:11  [facility]

## 2020-07-15 NOTE — DISCHARGE NOTE PROVIDER - CARE PROVIDER_API CALL
[facility]  SURGERY  [address] [place] [id number]  Phone: [phone]  Fax: [phone]  Follow Up Time:     Sree Gomez  Cardiovascular Disease  [address] [place] [address]  Phone: [phone]  Fax: [phone]  Follow Up Time: [facility]  SURGERY  [address] [place] [address]  Phone: [phone]  Fax: [phone]  Scheduled Appointment: 07/28/2020 02:00 PM    Sree Gomez  Cardiovascular Disease  [address] [place] [address]  Phone: [phone]  Fax: [phone]  Follow Up Time:

## 2020-07-16 LAB
ALBUMIN SERPL ELPH-MCNC: 4.4 G/DL — SIGNIFICANT CHANGE UP (ref 3.5–5.2)
ALP SERPL-CCNC: 28 U/L — LOW (ref 30–115)
ALT FLD-CCNC: 37 U/L — SIGNIFICANT CHANGE UP (ref 0–41)
ANION GAP SERPL CALC-SCNC: 12 MMOL/L — SIGNIFICANT CHANGE UP (ref 7–14)
APTT BLD: 25.2 SEC — LOW (ref 27–39.2)
AST SERPL-CCNC: 48 U/L — HIGH (ref 0–41)
BASOPHILS # BLD AUTO: 0.01 K/UL — SIGNIFICANT CHANGE UP (ref 0–0.2)
BASOPHILS NFR BLD AUTO: 0.1 % — SIGNIFICANT CHANGE UP (ref 0–1)
BILIRUB SERPL-MCNC: 1.6 MG/DL — HIGH (ref 0.2–1.2)
BUN SERPL-MCNC: 16 MG/DL — SIGNIFICANT CHANGE UP (ref 10–20)
CALCIUM SERPL-MCNC: 8 MG/DL — LOW (ref 8.5–10.1)
CHLORIDE SERPL-SCNC: 107 MMOL/L — SIGNIFICANT CHANGE UP (ref 98–110)
CO2 SERPL-SCNC: 21 MMOL/L — SIGNIFICANT CHANGE UP (ref 17–32)
CREAT SERPL-MCNC: 0.9 MG/DL — SIGNIFICANT CHANGE UP (ref 0.7–1.5)
EOSINOPHIL # BLD AUTO: 0 K/UL — SIGNIFICANT CHANGE UP (ref 0–0.7)
EOSINOPHIL NFR BLD AUTO: 0 % — SIGNIFICANT CHANGE UP (ref 0–8)
GLUCOSE BLDC GLUCOMTR-MCNC: 108 MG/DL — HIGH (ref 70–99)
GLUCOSE BLDC GLUCOMTR-MCNC: 111 MG/DL — HIGH (ref 70–99)
GLUCOSE BLDC GLUCOMTR-MCNC: 112 MG/DL — HIGH (ref 70–99)
GLUCOSE BLDC GLUCOMTR-MCNC: 112 MG/DL — HIGH (ref 70–99)
GLUCOSE BLDC GLUCOMTR-MCNC: 116 MG/DL — HIGH (ref 70–99)
GLUCOSE BLDC GLUCOMTR-MCNC: 120 MG/DL — HIGH (ref 70–99)
GLUCOSE BLDC GLUCOMTR-MCNC: 125 MG/DL — HIGH (ref 70–99)
GLUCOSE SERPL-MCNC: 112 MG/DL — HIGH (ref 70–99)
HCT VFR BLD CALC: 33.6 % — LOW (ref 42–52)
HGB BLD-MCNC: 10.9 G/DL — LOW (ref 14–18)
IMM GRANULOCYTES NFR BLD AUTO: 0.3 % — SIGNIFICANT CHANGE UP (ref 0.1–0.3)
INR BLD: 1.13 RATIO — SIGNIFICANT CHANGE UP (ref 0.65–1.3)
LYMPHOCYTES # BLD AUTO: 0.88 K/UL — LOW (ref 1.2–3.4)
LYMPHOCYTES # BLD AUTO: 8.4 % — LOW (ref 20.5–51.1)
MAGNESIUM SERPL-MCNC: 2 MG/DL — SIGNIFICANT CHANGE UP (ref 1.8–2.4)
MCHC RBC-ENTMCNC: 31.8 PG — HIGH (ref 27–31)
MCHC RBC-ENTMCNC: 32.4 G/DL — SIGNIFICANT CHANGE UP (ref 32–37)
MCV RBC AUTO: 98 FL — HIGH (ref 80–94)
MONOCYTES # BLD AUTO: 0.91 K/UL — HIGH (ref 0.1–0.6)
MONOCYTES NFR BLD AUTO: 8.7 % — SIGNIFICANT CHANGE UP (ref 1.7–9.3)
NEUTROPHILS # BLD AUTO: 8.6 K/UL — HIGH (ref 1.4–6.5)
NEUTROPHILS NFR BLD AUTO: 82.5 % — HIGH (ref 42.2–75.2)
NRBC # BLD: 0 /100 WBCS — SIGNIFICANT CHANGE UP (ref 0–0)
PLATELET # BLD AUTO: 118 K/UL — LOW (ref 130–400)
POTASSIUM SERPL-MCNC: 4.6 MMOL/L — SIGNIFICANT CHANGE UP (ref 3.5–5)
POTASSIUM SERPL-SCNC: 4.6 MMOL/L — SIGNIFICANT CHANGE UP (ref 3.5–5)
PROT SERPL-MCNC: 6 G/DL — SIGNIFICANT CHANGE UP (ref 6–8)
PROTHROM AB SERPL-ACNC: 13 SEC — HIGH (ref 9.95–12.87)
RBC # BLD: 3.43 M/UL — LOW (ref 4.7–6.1)
RBC # FLD: 14.3 % — SIGNIFICANT CHANGE UP (ref 11.5–14.5)
SODIUM SERPL-SCNC: 140 MMOL/L — SIGNIFICANT CHANGE UP (ref 135–146)
WBC # BLD: 10.43 K/UL — SIGNIFICANT CHANGE UP (ref 4.8–10.8)
WBC # FLD AUTO: 10.43 K/UL — SIGNIFICANT CHANGE UP (ref 4.8–10.8)

## 2020-07-16 PROCEDURE — 71045 X-RAY EXAM CHEST 1 VIEW: CPT | Mod: 26,77

## 2020-07-16 PROCEDURE — 93010 ELECTROCARDIOGRAM REPORT: CPT

## 2020-07-16 PROCEDURE — 71045 X-RAY EXAM CHEST 1 VIEW: CPT | Mod: 26

## 2020-07-16 PROCEDURE — 99233 SBSQ HOSP IP/OBS HIGH 50: CPT

## 2020-07-16 RX ORDER — ATORVASTATIN CALCIUM 80 MG/1
40 TABLET, FILM COATED ORAL AT BEDTIME
Refills: 0 | Status: DISCONTINUED | OUTPATIENT
Start: 2020-07-16 | End: 2020-07-21

## 2020-07-16 RX ORDER — SENNA PLUS 8.6 MG/1
1 TABLET ORAL EVERY 12 HOURS
Refills: 0 | Status: DISCONTINUED | OUTPATIENT
Start: 2020-07-16 | End: 2020-07-21

## 2020-07-16 RX ORDER — SODIUM BICARBONATE 1 MEQ/ML
50 SYRINGE (ML) INTRAVENOUS ONCE
Refills: 0 | Status: COMPLETED | OUTPATIENT
Start: 2020-07-16 | End: 2020-07-16

## 2020-07-16 RX ORDER — METOPROLOL TARTRATE 50 MG
25 TABLET ORAL
Refills: 0 | Status: DISCONTINUED | OUTPATIENT
Start: 2020-07-16 | End: 2020-07-17

## 2020-07-16 RX ORDER — OXYCODONE HYDROCHLORIDE 5 MG/1
5 TABLET ORAL EVERY 4 HOURS
Refills: 0 | Status: DISCONTINUED | OUTPATIENT
Start: 2020-07-16 | End: 2020-07-21

## 2020-07-16 RX ORDER — ASPIRIN/CALCIUM CARB/MAGNESIUM 324 MG
325 TABLET ORAL DAILY
Refills: 0 | Status: DISCONTINUED | OUTPATIENT
Start: 2020-07-16 | End: 2020-07-21

## 2020-07-16 RX ORDER — ALBUMIN HUMAN 25 %
500 VIAL (ML) INTRAVENOUS ONCE
Refills: 0 | Status: COMPLETED | OUTPATIENT
Start: 2020-07-16 | End: 2020-07-16

## 2020-07-16 RX ORDER — DEXTROSE 50 % IN WATER 50 %
15 SYRINGE (ML) INTRAVENOUS ONCE
Refills: 0 | Status: DISCONTINUED | OUTPATIENT
Start: 2020-07-16 | End: 2020-07-19

## 2020-07-16 RX ORDER — METOPROLOL TARTRATE 50 MG
2.5 TABLET ORAL ONCE
Refills: 0 | Status: COMPLETED | OUTPATIENT
Start: 2020-07-16 | End: 2020-07-16

## 2020-07-16 RX ORDER — ALLOPURINOL 300 MG
300 TABLET ORAL DAILY
Refills: 0 | Status: DISCONTINUED | OUTPATIENT
Start: 2020-07-16 | End: 2020-07-21

## 2020-07-16 RX ORDER — SODIUM CHLORIDE 9 MG/ML
1000 INJECTION, SOLUTION INTRAVENOUS
Refills: 0 | Status: DISCONTINUED | OUTPATIENT
Start: 2020-07-16 | End: 2020-07-21

## 2020-07-16 RX ORDER — OXYCODONE HYDROCHLORIDE 5 MG/1
10 TABLET ORAL EVERY 4 HOURS
Refills: 0 | Status: DISCONTINUED | OUTPATIENT
Start: 2020-07-16 | End: 2020-07-21

## 2020-07-16 RX ORDER — INSULIN LISPRO 100/ML
VIAL (ML) SUBCUTANEOUS
Refills: 0 | Status: DISCONTINUED | OUTPATIENT
Start: 2020-07-16 | End: 2020-07-19

## 2020-07-16 RX ORDER — ENOXAPARIN SODIUM 100 MG/ML
40 INJECTION SUBCUTANEOUS AT BEDTIME
Refills: 0 | Status: DISCONTINUED | OUTPATIENT
Start: 2020-07-16 | End: 2020-07-21

## 2020-07-16 RX ORDER — GLUCAGON INJECTION, SOLUTION 0.5 MG/.1ML
1 INJECTION, SOLUTION SUBCUTANEOUS ONCE
Refills: 0 | Status: DISCONTINUED | OUTPATIENT
Start: 2020-07-16 | End: 2020-07-21

## 2020-07-16 RX ORDER — IPRATROPIUM/ALBUTEROL SULFATE 18-103MCG
3 AEROSOL WITH ADAPTER (GRAM) INHALATION EVERY 6 HOURS
Refills: 0 | Status: DISCONTINUED | OUTPATIENT
Start: 2020-07-16 | End: 2020-07-20

## 2020-07-16 RX ORDER — PANTOPRAZOLE SODIUM 20 MG/1
40 TABLET, DELAYED RELEASE ORAL
Refills: 0 | Status: DISCONTINUED | OUTPATIENT
Start: 2020-07-16 | End: 2020-07-21

## 2020-07-16 RX ADMIN — SENNA PLUS 1 TABLET(S): 8.6 TABLET ORAL at 09:56

## 2020-07-16 RX ADMIN — Medication 2.5 MILLIGRAM(S): at 22:36

## 2020-07-16 RX ADMIN — ENOXAPARIN SODIUM 40 MILLIGRAM(S): 100 INJECTION SUBCUTANEOUS at 21:24

## 2020-07-16 RX ADMIN — MUPIROCIN 1 APPLICATION(S): 20 OINTMENT TOPICAL at 17:39

## 2020-07-16 RX ADMIN — Medication 250 MILLILITER(S): at 06:15

## 2020-07-16 RX ADMIN — SENNA PLUS 1 TABLET(S): 8.6 TABLET ORAL at 17:40

## 2020-07-16 RX ADMIN — Medication 2 PUFF(S): at 08:07

## 2020-07-16 RX ADMIN — Medication 100 MILLIGRAM(S): at 06:35

## 2020-07-16 RX ADMIN — OXYCODONE AND ACETAMINOPHEN 1 TABLET(S): 5; 325 TABLET ORAL at 08:00

## 2020-07-16 RX ADMIN — ATORVASTATIN CALCIUM 40 MILLIGRAM(S): 80 TABLET, FILM COATED ORAL at 21:24

## 2020-07-16 RX ADMIN — Medication 300 MILLIGRAM(S): at 11:24

## 2020-07-16 RX ADMIN — MUPIROCIN 1 APPLICATION(S): 20 OINTMENT TOPICAL at 06:35

## 2020-07-16 RX ADMIN — POLYETHYLENE GLYCOL 3350 17 GRAM(S): 17 POWDER, FOR SOLUTION ORAL at 11:24

## 2020-07-16 RX ADMIN — OXYCODONE AND ACETAMINOPHEN 1 TABLET(S): 5; 325 TABLET ORAL at 05:40

## 2020-07-16 RX ADMIN — Medication 25 MILLIGRAM(S): at 09:56

## 2020-07-16 RX ADMIN — Medication 15 MILLIGRAM(S): at 05:00

## 2020-07-16 RX ADMIN — Medication 325 MILLIGRAM(S): at 11:24

## 2020-07-16 RX ADMIN — Medication 15 MILLIGRAM(S): at 04:45

## 2020-07-16 RX ADMIN — FAMOTIDINE 20 MILLIGRAM(S): 10 INJECTION INTRAVENOUS at 06:34

## 2020-07-16 RX ADMIN — Medication 25 MILLIGRAM(S): at 17:39

## 2020-07-16 RX ADMIN — Medication 50 MILLIEQUIVALENT(S): at 05:49

## 2020-07-16 NOTE — PHYSICAL THERAPY INITIAL EVALUATION ADULT - CRITERIA FOR SKILLED THERAPEUTIC INTERVENTIONS
anticipated equipment needs at discharge/predicted duration of therapy intervention/impairments found/rehab potential/therapy frequency/anticipated discharge recommendation/functional limitations in following categories

## 2020-07-16 NOTE — PHYSICAL THERAPY INITIAL EVALUATION ADULT - GENERAL OBSERVATIONS, REHAB EVAL
14:00-14:30 Pt encountered sitting in bedside chair, alert and cooperative, +tele, + O2 via nasal canula, 2L/min , central line, A-line.

## 2020-07-16 NOTE — PROGRESS NOTE ADULT - SUBJECTIVE AND OBJECTIVE BOX
OPERATIVE PROCEDURE(s):    CABG            POD #     1                  69yMale  SURGEON(s): AGUSTIN Snell  SUBJECTIVE ASSESSMENT:  Patient has no complaints at this time.    Vital Signs Last 24 Hrs  T(F): 99.1 (2020 08:00), Max: 99.3 (15 Jul 2020 19:00)  HR: 103 (2020 09:00) (86 - 107) ST  BP: 93/54 (2020 08:00) (74/41 - 114/59)  BP(mean): 69 (2020 08:00) (53 - 83)  ABP: 116/49 (2020 09:00) (83/50 - 154/66)  ABP(mean): 67 (2020 09:00)  RR: 25 (:) (2 - 30)  SpO2: 99% (:00) (93% - 100%) 4LNC  CVP(mm Hg): 12 (2020 09:00)  CO: 7.6 (:)  CI: 3.4 (2020 09:00)  PA: 39/16 (2020 09:00)  SVR: 578 (2020 09:00)  Mode: CPAP with PS  FiO2: 40  PEEP: 5  PS: 7    I&O's Detail    15 Jul 2020 07:  -  2020 07:00  --------------------------------------------------------  IN:    Albumin 5%  - 500 mL: 1500 mL    dexmedetomidine Infusion: 35.9 mL    insulin regular Infusion: 27 mL    IV PiggyBack: 100 mL    niCARdipine Infusion: 25 mL    nitroglycerin  Infusion: 42 mL    norepinephrine Infusion: 73 mL    Oral Fluid: 200 mL    sodium chloride 0.9%.: 360 mL    vasopressin Infusion: 37.6 mL  Total IN: 2400.5 mL    OUT:    Chest Tube: 300 mL    Chest Tube: 158 mL    Indwelling Catheter - Urethral: 1524 mL  Total OUT: 1982 mL        Net:   I&O's Detail    2020 07:  -  15 Jul 2020 07:00  --------------------------------------------------------  Total NET: 1540 mL      15 Jul 2020 07:01  -  2020 07:00  --------------------------------------------------------  Total NET: 418.5 mL        CAPILLARY BLOOD GLUCOSE  111 (2020 05:30)  110 (2020 03:30)  115 (15 Jul 2020 22:00)  121 (15 Jul 2020 20:45)      POCT Blood Glucose.: 112 mg/dL (2020 08:59)  POCT Blood Glucose.: 120 mg/dL (2020 08:07)  POCT Blood Glucose.: 116 mg/dL (2020 06:49)  POCT Blood Glucose.: 112 mg/dL (2020 01:53)  POCT Blood Glucose.: 118 mg/dL (15 Jul 2020 20:39)    Physical Exam:  General: NAD; A&Ox3  Cardiac: S1/S2, RRR, no murmur, no rubs  Lungs: unlabored respirations, CTA b/l, no wheeze, no rales, no crackles  Abdomen: Soft/NT/ND; positive bowel sounds x 4  Sternum: Intact, no click, incision healing well with no drainage  Incisions: Incisions clean/dry/intact  Extremities: No edema b/l lower extremities; good capillary refill; no cyanosis; palpable 1+ pedal pulses b/l    Central Venous Catheter: Yes[x]  No[] , If Yes indication:   unstable        Day #1  Grigsby Catheter: Yes  [x] , No  [] , If yes indication:    remove today                  Day #1  NGT: Yes [] No [x] ,    If Yes Placement:                                     Day #  EPICARDIAL WIRES:  [x] YES [] NO           A-Wires                                   Day #1  BOWEL MOVEMENT:  [] YES [x] NO, If No, Timing since last BM:      Day #1  CHEST TUBE (Right):  [x] YES [] NO, If yes -  AIR LEAKS:  [] YES [x] NO        LABS:                        10.9<L>  10.43 )-----------( 118<L>    ( 2020 02:00 )             33.6<L>                        11.5<L>  16.03<H> )-----------( 119<L>    ( 15 Jul 2020 14:43 )             35.1<L>    07-16    140  |  107  |  16  ----------------------------<  112<H>  4.6   |  21  |  0.9  07-15    140  |  105  |  15  ----------------------------<  116<H>  4.3   |  21  |  0.9    Ca    8.0<L>      2020 02:00  Mg     2.0     07-16    TPro  6.0 [6.0 - 8.0]  /  Alb  4.4 [3.5 - 5.2]  /  TBili  1.6<H> [0.2 - 1.2]  /  DBili  x   /  AST  48<H> [0 - 41]  /  ALT  37 [0 - 41]  /  AlkPhos  28<L> [30 - 115]  07-16    PT/INR - ( 2020 02:00 )   PT: ;   INR: 1.13 ratio         PT/INR - ( 15 Jul 2020 14:43 )   PT: ;   INR: 1.22 ratio         PTT - ( 2020 02:00 )  PTT:25.2 sec, PTT - ( 15 Jul 2020 14:43 )  PTT:24.5 sec  Urinalysis Basic - ( 2020 18:00 )    Color: Yellow / Appearance: Clear / SG: >1.050 / pH: x  Gluc: x / Ketone: Negative  / Bili: Negative / Urobili: <2 mg/dL   Blood: x / Protein: 100 mg/dL / Nitrite: Negative   Leuk Esterase: Negative / RBC: 2 /HPF / WBC 2 /HPF   Sq Epi: x / Non Sq Epi: 1 /HPF / Bacteria: Negative      ABG - ( 2020 04:17 )  pH: 7.36  /  pCO2: 42    /  pO2: 105   / HCO3: 23    / Base Excess: -2.2  /  SaO2: 96    /  LA: 0.7              RADIOLOGY & ADDITIONAL TESTS:    EK Lead ECG:   Ventricular Rate 102 BPM    Atrial Rate 102 BPM    P-R Interval 164 ms    QRS Duration 84 ms    Q-T Interval 374 ms    QTC Calculation(Bezet) 487 ms    P Axis 35 degrees    R Axis -15 degrees    T Axis 27 degrees    Diagnosis Line *** Poor data quality, interpretation may be adversely affected  Sinus tachycardia  Otherwise normal ECG    Confirmed by Davey Yao ([id number]) on 2020 8:21:39 AM (20 @ 08:01)    MEDICATIONS  (STANDING):  albumin human  5% IVPB 1000 milliLiter(s) IV Intermittent once  ALBUTerol    90 MICROgram(s) HFA Inhaler 2 Puff(s) Inhalation every 6 hours  dexMEDEtomidine Infusion 0.1 MICROgram(s)/kG/Hr (2.88 mL/Hr) IV Continuous <Continuous>  dextrose 50% Injectable 50 milliLiter(s) IV Push every 15 minutes  dextrose 50% Injectable 25 milliLiter(s) IV Push every 15 minutes  famotidine Injectable 20 milliGRAM(s) IV Push every 12 hours  insulin regular Infusion 10 Unit(s)/Hr (10 mL/Hr) IV Continuous <Continuous>  ipratropium 17 MICROgram(s) HFA Inhaler 2 Puff(s) Inhalation every 6 hours  meperidine     Injectable 25 milliGRAM(s) IV Push once  mupirocin 2% Ointment 1 Application(s) Topical every 12 hours  niCARdipine Infusion 5 mG/Hr (25 mL/Hr) IV Continuous <Continuous>  nitroglycerin  Infusion 30 MICROgram(s)/Min (9 mL/Hr) IV Continuous <Continuous>  norepinephrine Infusion 0.05 MICROgram(s)/kG/Min (10.8 mL/Hr) IV Continuous <Continuous>  polyethylene glycol 3350 17 Gram(s) Oral daily  propofol Infusion 30 MICROgram(s)/kG/Min (20.7 mL/Hr) IV Continuous <Continuous>  sodium chloride 0.9%. 1000 milliLiter(s) (250 mL/Hr) IV Continuous <Continuous>  sodium chloride 0.9%. 1000 milliLiter(s) (20 mL/Hr) IV Continuous <Continuous>  vasopressin Infusion 0.04 Unit(s)/Min (2.4 mL/Hr) IV Continuous <Continuous>    MEDICATIONS  (PRN):  ketorolac   Injectable 15 milliGRAM(s) IV Push every 4 hours PRN Severe Pain (7 - 10)  oxycodone    5 mG/acetaminophen 325 mG 1 Tablet(s) Oral every 4 hours PRN Mild Pain (1 - 3)  oxycodone    5 mG/acetaminophen 325 mG 2 Tablet(s) Oral every 6 hours PRN Moderate Pain (4 - 6)    LOVENOX:[x] YES [] NO  Dose: 40mg QPM  SCD's: YES b/l  GI Prophylaxis: Protonix [x], Pepcid [], None [], (Contra-indication:.....)    Post-Op Aspirin: Yes [x],  No [], If No, then contra-indication:  Post-Op Statin: Yes [x], No[], If No, then contra-indication:  Post-Op Beta-Blockers: Yes [x], No [], If No, then contra-indication:    Allergies:  adhesives (Rash)  Keflex (Diarrhea)  methylPREDNISolone (Pruritus)  rash (Rash)      Ambulation/Activity Status: Ambulates several times daily with assistance.    Assessment/Plan:  69y Male status-post CABG POD#1  - Case and plan discussed with CTU Intensivist and CT Surgeon - Dr. Koroma/Yesi  - Continue CTU supportive care    - Continue DVT/GI prophylaxis  - Incentive Spirometry 10 times an hour  - Continue to advance physical activity as tolerated and continue PT/OT as directed  1. CAD: Start ASA, statin, BB  2. D/C CT; Sump; A-Line; Esther Grigsby

## 2020-07-16 NOTE — PROGRESS NOTE ADULT - SUBJECTIVE AND OBJECTIVE BOX
CTU Attending Progress Daily Note     16 Jul 2020 07:54    Procedure:                                                  POD#                   Patient seen as post-op critical care follow-up    HPI:  [69y man]    CC: chest pain    PMH: hypertension, chronic back pain, BPH, GERD, gout, and renal cell carcinoma s/p partial left nephrectomy (9/17/2009)    PSH: Bladder polyp S/p removal (4/4/2019 by Dr. Wylie), TURP (7/27/2005 by Dr. Mnedez), laminectomy of L3/L4 (9/11/2002 by Dr. Jara), partial left nephrectomy (9/17/2009 by Dr. Vergara), spinal fusion (L4/5-L5/S1 6/23/2008 by Dr. Jara) with revision of fusion Transome Axial JF (3/1/2020 by Dr. 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 The patient was told the results were abnormal and that he had "a possible blockage." He was scheduled for elective cardiac catheterization with Dr. Fernandes on 7/23/2020. On the day of presentation, the patient had finished eat breakfast ("a bowl of Cheerios for the heart") and was sitting down talking to his wife at home when he began developing back pain behind his left shoulder. He said that the pain was different from the chronic back pain that he usually feels. The pain was 6/10 intensity that did not radiate and was accompanied with chest tightness and nausea. He decided to come to ED, worried that something was wrong. By the time the patient arrived at the hospital, his chest pain had diminished to a 3-4/10 intensity and the nausea had resolved.    In the ED, vital signs were Tmax 98.7F, , /91, RR 16, SpO2 99% on room air. Patient was given aspirin 162mg PO x1 dose. On sign out, ED mentioned possible Q waves on ECG but I did not appreciated them. ECG noted for left anterior fascicular block. Troponin negative x1. Per ED, patient is scheduled in AM for cardiac catheterization with Dr. Fernandes. When I spoke to the patient, the pain had mostly resolved and was now a 1/10. (13 Jul 2020 18:43)    See preop testing chart H&P    Interval event for past 24 hr:  EUSEBIO MORENO  69y had no event.     Current Complains:  EUSEBIO MORENO has no new complaints    REVIEW OF SYSTEMS:  CONSTITUTIONAL:  [-] weakness, [-] fevers, [-] chills  EYES/ENT: [-] visual changes, [-] vertigo, [-] throat pain   NECK: [-] pain, [-] stiffness  RESPIRATORY: [-] cough, [-] wheezing, [-] hemoptysis, [-] shortness of breath  CARDIOVASCULAR: [-] chest pain, [-] palpitations, [-] orthopnea  GASTROINTESTINAL:    [-]abdominal pain, [-] nausea, [-] vomiting, [-] hematemesis, [-] diarrhea, [-] constipation, [-] melena, [-] hematochezia.  GENITOURINARY: [-] dysuria, [-] frequency, [-] hematuria  NEUROLOGICAL: [-] numbness, [-] weakness  SKIN: [-] itching, [-] burning, [-] rashes, [-] lesions   All other review of systems is negative unless indicated above.    [  ] Unable to assess ROS because :    OBJECTIVE:  ICU Vital Signs Last 24 Hrs  T(C): 37.2 (16 Jul 2020 07:00), Max: 37.4 (15 Jul 2020 19:00)  T(F): 99 (16 Jul 2020 07:00), Max: 99.3 (15 Jul 2020 19:00)  HR: 107 (16 Jul 2020 07:00) (86 - 107)  BP: 114/59 (15 Jul 2020 19:15) (74/41 - 114/59)  BP(mean): 83 (15 Jul 2020 19:15) (53 - 83)  ABP: 122/55 (16 Jul 2020 07:00) (83/50 - 154/66)  ABP(mean): 73 (16 Jul 2020 07:00) (62 - 95)  RR: 22 (16 Jul 2020 07:00) (2 - 30)  SpO2: 96% (16 Jul 2020 07:00) (93% - 100%)      I&O's Summary    15 Jul 2020 07:01  -  16 Jul 2020 07:00  --------------------------------------------------------  IN: 2400.5 mL / OUT: 1982 mL / NET: 418.5 mL      Adult Advanced Hemodynamics Last 24 Hrs  CVP(mm Hg): 291 (16 Jul 2020 07:00) (3 - 291)  CVP(cm H2O): --  CO: 6.4 (16 Jul 2020 04:30) (5.7 - 7.7)  CI: 2.8 (16 Jul 2020 04:30) (2.5 - 3.4)  PA: 38/15 (16 Jul 2020 07:00) (22/7 - 46/21)  PA(mean): 24 (16 Jul 2020 07:00) (10 - 30)  PCWP: --  SVR: 923 (16 Jul 2020 04:30) (601 - 976)  SVRI: 2111 (16 Jul 2020 04:30) (7343 - 2278)  PVR: --  PVRI: --  Mode: CPAP with PS  FiO2: 40  PEEP: 5  PS: 7      PHYSICAL EXAM:  General: WN/WD NAD    HEENT:     [+] NCAT  [+] EOMI  [-] Conjuctival edema   [-] Icterus   [-] Thrush   [-] ETT  [-] NGT/OGT    Neck:         [+] FROM   [-] JVD     [-] Nodes     [-] Masses    [+] Mid-line trachea    [-] Tracheostomy    Chest:         [-] Sternal click   [-] Sternal drainage   [+] Pacing wires   [+] Chest tubes   [-] SubQ emphysema    Lungs:          [+] CTA   [-] Rhonchi   [-] Rales    [-] Wheezing    [-] Decreased BS    [-] Dullness R L    Cardiac:       [+] S1 [+] S2    [+] RRR   [-] Irregular   [-] S3   [-] S4    [-] Murmurs    [-] Rub    Abdomen:    [+] BS    [+] Soft    [+] Non-tender     [-] Distended    [-] Organomegaly  [-] PEG    Extremities:   [-] Cyanosis U/L   [-] Clubbing  U/L  [-] LE/UE Edema   [+] Capillary refill    [+] Pulses     Neuro:        [+] Awake   [+]  Alert   [-] Confused   [-] Lethargic   [-] Sedated   [-] Generalized Weakness    Skin:        [-] Rashes    [-] Erythema   [+] Normal incisions   [+] IV sites intact   [-] Sacral decubitus    Tubes:  LINES:    CAPILLARY BLOOD GLUCOSE  111 (16 Jul 2020 05:30)      POCT Blood Glucose.: 116 mg/dL (16 Jul 2020 06:49)    CAPILLARY BLOOD GLUCOSE  111 (16 Jul 2020 05:30)  110 (16 Jul 2020 03:30)  115 (15 Jul 2020 22:00)  121 (15 Jul 2020 20:45)      POCT Blood Glucose.: 116 mg/dL (16 Jul 2020 06:49)  POCT Blood Glucose.: 112 mg/dL (16 Jul 2020 01:53)  POCT Blood Glucose.: 118 mg/dL (15 Jul 2020 20:39)      HOSPITAL MEDICATIONS:  MEDICATIONS  (STANDING):  albumin human  5% IVPB 1000 milliLiter(s) IV Intermittent once  ALBUTerol    90 MICROgram(s) HFA Inhaler 2 Puff(s) Inhalation every 6 hours  dexMEDEtomidine Infusion 0.1 MICROgram(s)/kG/Hr (2.88 mL/Hr) IV Continuous <Continuous>  dextrose 50% Injectable 50 milliLiter(s) IV Push every 15 minutes  dextrose 50% Injectable 25 milliLiter(s) IV Push every 15 minutes  famotidine Injectable 20 milliGRAM(s) IV Push every 12 hours  insulin regular Infusion 10 Unit(s)/Hr (10 mL/Hr) IV Continuous <Continuous>  ipratropium 17 MICROgram(s) HFA Inhaler 2 Puff(s) Inhalation every 6 hours  meperidine     Injectable 25 milliGRAM(s) IV Push once  mupirocin 2% Ointment 1 Application(s) Topical every 12 hours  niCARdipine Infusion 5 mG/Hr (25 mL/Hr) IV Continuous <Continuous>  nitroglycerin  Infusion 30 MICROgram(s)/Min (9 mL/Hr) IV Continuous <Continuous>  norepinephrine Infusion 0.05 MICROgram(s)/kG/Min (10.8 mL/Hr) IV Continuous <Continuous>  polyethylene glycol 3350 17 Gram(s) Oral daily  propofol Infusion 30 MICROgram(s)/kG/Min (20.7 mL/Hr) IV Continuous <Continuous>  sodium chloride 0.9%. 1000 milliLiter(s) (250 mL/Hr) IV Continuous <Continuous>  sodium chloride 0.9%. 1000 milliLiter(s) (20 mL/Hr) IV Continuous <Continuous>  vasopressin Infusion 0.04 Unit(s)/Min (2.4 mL/Hr) IV Continuous <Continuous>    MEDICATIONS  (PRN):  ketorolac   Injectable 15 milliGRAM(s) IV Push every 4 hours PRN Severe Pain (7 - 10)  oxycodone    5 mG/acetaminophen 325 mG 1 Tablet(s) Oral every 4 hours PRN Mild Pain (1 - 3)  oxycodone    5 mG/acetaminophen 325 mG 2 Tablet(s) Oral every 6 hours PRN Moderate Pain (4 - 6)      LABS:  ABG - ( 16 Jul 2020 04:17 )  pH, Arterial: 7.36  pH, Blood: x     /  pCO2: 42    /  pO2: 105   / HCO3: 23    / Base Excess: -2.2  /  SaO2: 96                                      10.9   10.43 )-----------( 118      ( 16 Jul 2020 02:00 )             33.6     07-16    140  |  107  |  16  ----------------------------<  112<H>  4.6   |  21  |  0.9    Ca    8.0<L>      16 Jul 2020 02:00  Mg     2.0     07-16    TPro  6.0  /  Alb  4.4  /  TBili  1.6<H>  /  DBili  x   /  AST  48<H>  /  ALT  37  /  AlkPhos  28<L>  07-16    PT/INR - ( 16 Jul 2020 02:00 )   PT: 13.00 sec;   INR: 1.13 ratio         PTT - ( 16 Jul 2020 02:00 )  PTT:25.2 sec  Urinalysis Basic - ( 14 Jul 2020 18:00 )    Color: Yellow / Appearance: Clear / SG: >1.050 / pH: x  Gluc: x / Ketone: Negative  / Bili: Negative / Urobili: <2 mg/dL   Blood: x / Protein: 100 mg/dL / Nitrite: Negative   Leuk Esterase: Negative / RBC: 2 /HPF / WBC 2 /HPF   Sq Epi: x / Non Sq Epi: 1 /HPF / Bacteria: Negative          RADIOLOGY:  Reviewed and interpreted by me  CXR from 07-16-20 shows [+] mild congestion, [-] pneumothorax, [-] R/L effusion, [-] cardiomegaly,   NGT in place, S-G Catheter in place, R/L TLC in place, R/L Chest Tubes in place    ECG:  Reviewed and interpreted by me:   QTC:    Assessment:      PAST MEDICAL & SURGICAL HISTORY:  BPH (benign prostatic hyperplasia)  GERD (gastroesophageal reflux disease)  Back pain  HTN (hypertension)  Gout  Renal cancer, left: Renal cell carcinoma s/p partial left nephrectomy (20% removed)  Bladder polyp: S/p removal 4/4/2019 by Dr. Wylie  H/O spinal fusion: L4/5-L5/S1 6/23/2008 by Dr. Jara with revision of fusion Transome Axial JF 3/1/2020 by Dr. Jara  H/O laminectomy: L3/L4 9/11/2002 by Dr. Marek Clay tumor: Removed 2/3/2006 by Dr. Prieto  H/O cystoscopy: TURP 7/27/2005 by Dr. Mendez  H/O partial nephrectomy: 9/17/2009 by Dr. Vergara      PLAN:  Neuro: Pain control  Pulm: Encourage coughing, deep breathing and use of incentive spirometry. Wean off supplemental oxygen as able. Daily CXR.   Cardio: Monitor telemetry/alarms. Continue cardiac meds  GI: Tolerating diet. Continue stool softeners. Continue GI prophylaxis  Renal: monitor urine output, supplement electrolytes as needed  Vasc: Heparin SC/SCDs for DVT prophylaxis  Heme: Monitor H/H.   ID: Off antibiotics. Stable.  Endocrine: Monitor finger stick blood sugar and control hyperglycemia with insulin  Physical Therapy: OOB/ambulate  Tubes: Monitor Chest tube output      Discussed with Cardiothoracic Team at AM rounds.    45 minutes of critical care time spent providing medical care for patient's acute illness/conditions that impairs at least one vital organ system and/or poses a high risk of imminent or life threatening deterioration in the patient's condition. It includes time spent evaluating and treating the patient's acute illness as well as time spent reviewing labs, radiology, discussing goals of care with patient and/or patient's family, and discussing the case with a multidisciplinary team in an effort to prevent further life threatening deterioration or end organ damage. This time is independent of any procedures performed. CTU Attending Progress Daily Note     16 Jul 2020 07:54    Procedure:               CABG                                   POD#       1            Patient seen as post-op critical care follow-up    HPI:  [69y man]    CC: chest pain    PMH: hypertension, chronic back pain, BPH, GERD, gout, and renal cell carcinoma s/p partial left nephrectomy (9/17/2009)    PSH: Bladder polyp S/p removal (4/4/2019 by Dr. Wylie), TURP (7/27/2005 by Dr. Mendez), laminectomy of L3/L4 (9/11/2002 by Dr. Jara), partial left nephrectomy (9/17/2009 by Dr. Vergara), spinal fusion (L4/5-L5/S1 6/23/2008 by Dr. Jara) with revision of fusion Transome Axial JF (3/1/2020 by Dr. 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 The patient was told the results were abnormal and that he had "a possible blockage." He was scheduled for elective cardiac catheterization with Dr. Fernandes on 7/23/2020. On the day of presentation, the patient had finished eat breakfast ("a bowl of Cheerios for the heart") and was sitting down talking to his wife at home when he began developing back pain behind his left shoulder. He said that the pain was different from the chronic back pain that he usually feels. The pain was 6/10 intensity that did not radiate and was accompanied with chest tightness and nausea. He decided to come to ED, worried that something was wrong. By the time the patient arrived at the hospital, his chest pain had diminished to a 3-4/10 intensity and the nausea had resolved.    In the ED, vital signs were Tmax 98.7F, , /91, RR 16, SpO2 99% on room air. Patient was given aspirin 162mg PO x1 dose. On sign out, ED mentioned possible Q waves on ECG but I did not appreciated them. ECG noted for left anterior fascicular block. Troponin negative x1. Per ED, patient is scheduled in AM for cardiac catheterization with Dr. Fernandes. When I spoke to the patient, the pain had mostly resolved and was now a 1/10. (13 Jul 2020 18:43)    See preop testing chart H&P    Interval event for past 24 hr:  EUSEBIO MORENO  69y had hypotension followed by HTN on cardene gtt    Current Complains:  EUSEBIO MORENO has no new complaints    REVIEW OF SYSTEMS:  CONSTITUTIONAL:  [-] weakness, [-] fevers, [-] chills  EYES/ENT: [-] visual changes, [-] vertigo, [-] throat pain   NECK: [-] pain, [-] stiffness  RESPIRATORY: [-] cough, [-] wheezing, [-] hemoptysis, [-] shortness of breath  CARDIOVASCULAR: [-] chest pain, [-] palpitations, [-] orthopnea  GASTROINTESTINAL:    [-]abdominal pain, [-] nausea, [-] vomiting, [-] hematemesis, [-] diarrhea, [-] constipation, [-] melena, [-] hematochezia.  GENITOURINARY: [-] dysuria, [-] frequency, [-] hematuria  NEUROLOGICAL: [-] numbness, [-] weakness  SKIN: [-] itching, [-] burning, [-] rashes, [-] lesions   All other review of systems is negative unless indicated above.    [  ] Unable to assess ROS because :    OBJECTIVE:  ICU Vital Signs Last 24 Hrs  T(C): 37.2 (16 Jul 2020 07:00), Max: 37.4 (15 Jul 2020 19:00)  T(F): 99 (16 Jul 2020 07:00), Max: 99.3 (15 Jul 2020 19:00)  HR: 107 (16 Jul 2020 07:00) (86 - 107)  BP: 114/59 (15 Jul 2020 19:15) (74/41 - 114/59)  BP(mean): 83 (15 Jul 2020 19:15) (53 - 83)  ABP: 122/55 (16 Jul 2020 07:00) (83/50 - 154/66)  ABP(mean): 73 (16 Jul 2020 07:00) (62 - 95)  RR: 22 (16 Jul 2020 07:00) (2 - 30)  SpO2: 96% (16 Jul 2020 07:00) (93% - 100%)      I&O's Summary    15 Jul 2020 07:01  -  16 Jul 2020 07:00  --------------------------------------------------------  IN: 2400.5 mL / OUT: 1982 mL / NET: 418.5 mL      Adult Advanced Hemodynamics Last 24 Hrs  CVP(mm Hg): 291 (16 Jul 2020 07:00) (3 - 291)  CVP(cm H2O): --  CO: 6.4 (16 Jul 2020 04:30) (5.7 - 7.7)  CI: 2.8 (16 Jul 2020 04:30) (2.5 - 3.4)  PA: 38/15 (16 Jul 2020 07:00) (22/7 - 46/21)  PA(mean): 24 (16 Jul 2020 07:00) (10 - 30)  PCWP: --  SVR: 923 (16 Jul 2020 04:30) (601 - 976)  SVRI: 2111 (16 Jul 2020 04:30) (7163 - 2278)  PVR: --  PVRI: --  Mode: CPAP with PS  FiO2: 40  PEEP: 5  PS: 7      PHYSICAL EXAM:  General: WN/WD NAD    HEENT:     [+] NCAT  [+] EOMI  [-] Conjuctival edema   [-] Icterus   [-] Thrush   [-] ETT  [-] NGT/OGT    Neck:         [+] FROM   [-] JVD     [-] Nodes     [-] Masses    [+] Mid-line trachea    [-] Tracheostomy    Chest:         [-] Sternal click   [-] Sternal drainage   [+] Pacing wires   [+] Chest tubes   [-] SubQ emphysema    Lungs:          [+] CTA   [-] Rhonchi   [-] Rales    [-] Wheezing    [-] Decreased BS    [-] Dullness R L    Cardiac:       [+] S1 [+] S2    [+] RRR   [-] Irregular   [-] S3   [-] S4    [-] Murmurs    [-] Rub    Abdomen:    [+] BS    [+] Soft    [+] Non-tender     [-] Distended    [-] Organomegaly  [-] PEG    Extremities:   [-] Cyanosis U/L   [-] Clubbing  U/L  [-] LE/UE Edema   [+] Capillary refill    [+] Pulses     Neuro:        [+] Awake   [+]  Alert   [-] Confused   [-] Lethargic   [-] Sedated   [-] Generalized Weakness    Skin:        [-] Rashes    [-] Erythema   [+] Normal incisions   [+] IV sites intact   [-] Sacral decubitus    Tubes:  LINES:    CAPILLARY BLOOD GLUCOSE  111 (16 Jul 2020 05:30)      POCT Blood Glucose.: 116 mg/dL (16 Jul 2020 06:49)    CAPILLARY BLOOD GLUCOSE  111 (16 Jul 2020 05:30)  110 (16 Jul 2020 03:30)  115 (15 Jul 2020 22:00)  121 (15 Jul 2020 20:45)      POCT Blood Glucose.: 116 mg/dL (16 Jul 2020 06:49)  POCT Blood Glucose.: 112 mg/dL (16 Jul 2020 01:53)  POCT Blood Glucose.: 118 mg/dL (15 Jul 2020 20:39)      HOSPITAL MEDICATIONS:  MEDICATIONS  (STANDING):  albumin human  5% IVPB 1000 milliLiter(s) IV Intermittent once  ALBUTerol    90 MICROgram(s) HFA Inhaler 2 Puff(s) Inhalation every 6 hours  dexMEDEtomidine Infusion 0.1 MICROgram(s)/kG/Hr (2.88 mL/Hr) IV Continuous <Continuous>  dextrose 50% Injectable 50 milliLiter(s) IV Push every 15 minutes  dextrose 50% Injectable 25 milliLiter(s) IV Push every 15 minutes  famotidine Injectable 20 milliGRAM(s) IV Push every 12 hours  insulin regular Infusion 10 Unit(s)/Hr (10 mL/Hr) IV Continuous <Continuous>  ipratropium 17 MICROgram(s) HFA Inhaler 2 Puff(s) Inhalation every 6 hours  meperidine     Injectable 25 milliGRAM(s) IV Push once  mupirocin 2% Ointment 1 Application(s) Topical every 12 hours  niCARdipine Infusion 5 mG/Hr (25 mL/Hr) IV Continuous <Continuous>  nitroglycerin  Infusion 30 MICROgram(s)/Min (9 mL/Hr) IV Continuous <Continuous>  norepinephrine Infusion 0.05 MICROgram(s)/kG/Min (10.8 mL/Hr) IV Continuous <Continuous>  polyethylene glycol 3350 17 Gram(s) Oral daily  propofol Infusion 30 MICROgram(s)/kG/Min (20.7 mL/Hr) IV Continuous <Continuous>  sodium chloride 0.9%. 1000 milliLiter(s) (250 mL/Hr) IV Continuous <Continuous>  sodium chloride 0.9%. 1000 milliLiter(s) (20 mL/Hr) IV Continuous <Continuous>  vasopressin Infusion 0.04 Unit(s)/Min (2.4 mL/Hr) IV Continuous <Continuous>    MEDICATIONS  (PRN):  ketorolac   Injectable 15 milliGRAM(s) IV Push every 4 hours PRN Severe Pain (7 - 10)  oxycodone    5 mG/acetaminophen 325 mG 1 Tablet(s) Oral every 4 hours PRN Mild Pain (1 - 3)  oxycodone    5 mG/acetaminophen 325 mG 2 Tablet(s) Oral every 6 hours PRN Moderate Pain (4 - 6)      Home Medications:  allopurinol 300 mg oral tablet: 1 tab(s) orally once a day (13 Jul 2020 18:35)  aspirin 81 mg oral tablet: 1 tab(s) orally Tuesday, Thursday, Saturday, Sunday (13 Jul 2020 18:35)  Colace 100 mg oral capsule: 1 cap(s) orally Monday, Wednesday, and Friday (13 Jul 2020 18:35)  Diovan  mg-12.5 mg oral tablet: 1 tab(s) orally once a day (13 Jul 2020 18:35)  Fish Oil 1200 mg oral capsule: 1 cap(s) orally 2 times a day (13 Jul 2020 18:35)  Flax Seed Oil oral capsule: 1200 milligram(s) orally 2 times a day (13 Jul 2020 18:35)  Multiple Vitamins oral tablet: 1 tab(s) orally once a day (13 Jul 2020 18:35)  PriLOSEC OTC 20 mg oral delayed release tablet: 1 tab(s) orally Tuesday, Thursday, Saturday, Sunday (13 Jul 2020 18:35)        LABS:  ABG - ( 16 Jul 2020 04:17 )  pH, Arterial: 7.36  pH, Blood: x     /  pCO2: 42    /  pO2: 105   / HCO3: 23    / Base Excess: -2.2  /  SaO2: 96                                      10.9   10.43 )-----------( 118      ( 16 Jul 2020 02:00 )             33.6     07-16    140  |  107  |  16  ----------------------------<  112<H>  4.6   |  21  |  0.9    Ca    8.0<L>      16 Jul 2020 02:00  Mg     2.0     07-16    TPro  6.0  /  Alb  4.4  /  TBili  1.6<H>  /  DBili  x   /  AST  48<H>  /  ALT  37  /  AlkPhos  28<L>  07-16    PT/INR - ( 16 Jul 2020 02:00 )   PT: 13.00 sec;   INR: 1.13 ratio         PTT - ( 16 Jul 2020 02:00 )  PTT:25.2 sec  Urinalysis Basic - ( 14 Jul 2020 18:00 )    Color: Yellow / Appearance: Clear / SG: >1.050 / pH: x  Gluc: x / Ketone: Negative  / Bili: Negative / Urobili: <2 mg/dL   Blood: x / Protein: 100 mg/dL / Nitrite: Negative   Leuk Esterase: Negative / RBC: 2 /HPF / WBC 2 /HPF   Sq Epi: x / Non Sq Epi: 1 /HPF / Bacteria: Negative          RADIOLOGY:  Reviewed and interpreted by me  CXR from 07-16-20 shows [+] mild congestion, [-] pneumothorax, [-] R/L effusion, [-] cardiomegaly,   NGT in place, S-G Catheter in place, R/L TLC in place, R/L Chest Tubes in place    ECG:  Reviewed and interpreted by me:   QTC: 487    Assessment:  CAD SP CABG  HTN on cardene gtt      PAST MEDICAL & SURGICAL HISTORY:  BPH (benign prostatic hyperplasia)  GERD (gastroesophageal reflux disease)  Back pain  HTN (hypertension)  Gout  Renal cancer, left: Renal cell carcinoma s/p partial left nephrectomy (20% removed)  Bladder polyp: S/p removal 4/4/2019 by Dr. Wylie  H/O spinal fusion: L4/5-L5/S1 6/23/2008 by Dr. Jara with revision of fusion Transome Axial JF 3/1/2020 by Dr. Jara  H/O laminectomy: L3/L4 9/11/2002 by Dr. Marek Clay tumor: Removed 2/3/2006 by Dr. Prieto  H/O cystoscopy: TURP 7/27/2005 by Dr. Mendez  H/O partial nephrectomy: 9/17/2009 by Dr. Vergara      PLAN:  Neuro: Pain control  Pulm: Encourage coughing, deep breathing and use of incentive spirometry. Wean off supplemental oxygen as able. Daily CXR.   Cardio: Monitor telemetry/alarms. Continue cardiac meds  GI: Tolerating diet. Continue stool softeners. Continue GI prophylaxis  Renal: monitor urine output, supplement electrolytes as needed  Vasc: Heparin SC/SCDs for DVT prophylaxis  Heme: Monitor H/H.   ID: Off antibiotics. Stable.  Endocrine: Monitor finger stick blood sugar and control hyperglycemia with insulin  Physical Therapy: OOB/ambulate  Tubes: Monitor Chest tube output      Discussed with Cardiothoracic Team at AM rounds.

## 2020-07-17 LAB
ANION GAP SERPL CALC-SCNC: 13 MMOL/L — SIGNIFICANT CHANGE UP (ref 7–14)
BASOPHILS # BLD AUTO: 0.02 K/UL — SIGNIFICANT CHANGE UP (ref 0–0.2)
BASOPHILS NFR BLD AUTO: 0.2 % — SIGNIFICANT CHANGE UP (ref 0–1)
BUN SERPL-MCNC: 16 MG/DL — SIGNIFICANT CHANGE UP (ref 10–20)
CALCIUM SERPL-MCNC: 8.7 MG/DL — SIGNIFICANT CHANGE UP (ref 8.5–10.1)
CHLORIDE SERPL-SCNC: 104 MMOL/L — SIGNIFICANT CHANGE UP (ref 98–110)
CO2 SERPL-SCNC: 24 MMOL/L — SIGNIFICANT CHANGE UP (ref 17–32)
CREAT SERPL-MCNC: 0.8 MG/DL — SIGNIFICANT CHANGE UP (ref 0.7–1.5)
EOSINOPHIL # BLD AUTO: 0.02 K/UL — SIGNIFICANT CHANGE UP (ref 0–0.7)
EOSINOPHIL NFR BLD AUTO: 0.2 % — SIGNIFICANT CHANGE UP (ref 0–8)
GLUCOSE BLDC GLUCOMTR-MCNC: 129 MG/DL — HIGH (ref 70–99)
GLUCOSE BLDC GLUCOMTR-MCNC: 131 MG/DL — HIGH (ref 70–99)
GLUCOSE BLDC GLUCOMTR-MCNC: 156 MG/DL — HIGH (ref 70–99)
GLUCOSE SERPL-MCNC: 131 MG/DL — HIGH (ref 70–99)
HCT VFR BLD CALC: 34 % — LOW (ref 42–52)
HGB BLD-MCNC: 10.9 G/DL — LOW (ref 14–18)
IMM GRANULOCYTES NFR BLD AUTO: 0.8 % — HIGH (ref 0.1–0.3)
LYMPHOCYTES # BLD AUTO: 0.89 K/UL — LOW (ref 1.2–3.4)
LYMPHOCYTES # BLD AUTO: 7.5 % — LOW (ref 20.5–51.1)
MAGNESIUM SERPL-MCNC: 1.8 MG/DL — SIGNIFICANT CHANGE UP (ref 1.8–2.4)
MCHC RBC-ENTMCNC: 31.7 PG — HIGH (ref 27–31)
MCHC RBC-ENTMCNC: 32.1 G/DL — SIGNIFICANT CHANGE UP (ref 32–37)
MCV RBC AUTO: 98.8 FL — HIGH (ref 80–94)
MONOCYTES # BLD AUTO: 1.09 K/UL — HIGH (ref 0.1–0.6)
MONOCYTES NFR BLD AUTO: 9.2 % — SIGNIFICANT CHANGE UP (ref 1.7–9.3)
NEUTROPHILS # BLD AUTO: 9.77 K/UL — HIGH (ref 1.4–6.5)
NEUTROPHILS NFR BLD AUTO: 82.1 % — HIGH (ref 42.2–75.2)
NRBC # BLD: 0 /100 WBCS — SIGNIFICANT CHANGE UP (ref 0–0)
PLATELET # BLD AUTO: 125 K/UL — LOW (ref 130–400)
POTASSIUM SERPL-MCNC: 4 MMOL/L — SIGNIFICANT CHANGE UP (ref 3.5–5)
POTASSIUM SERPL-SCNC: 4 MMOL/L — SIGNIFICANT CHANGE UP (ref 3.5–5)
RBC # BLD: 3.44 M/UL — LOW (ref 4.7–6.1)
RBC # FLD: 14.6 % — HIGH (ref 11.5–14.5)
SODIUM SERPL-SCNC: 141 MMOL/L — SIGNIFICANT CHANGE UP (ref 135–146)
TSH RECEP AB FLD-ACNC: <1.1 IU/L — SIGNIFICANT CHANGE UP (ref 0–1.75)
WBC # BLD: 11.88 K/UL — HIGH (ref 4.8–10.8)
WBC # FLD AUTO: 11.88 K/UL — HIGH (ref 4.8–10.8)

## 2020-07-17 PROCEDURE — 71045 X-RAY EXAM CHEST 1 VIEW: CPT | Mod: 26

## 2020-07-17 PROCEDURE — 93010 ELECTROCARDIOGRAM REPORT: CPT

## 2020-07-17 PROCEDURE — 99232 SBSQ HOSP IP/OBS MODERATE 35: CPT

## 2020-07-17 RX ORDER — FUROSEMIDE 40 MG
40 TABLET ORAL ONCE
Refills: 0 | Status: COMPLETED | OUTPATIENT
Start: 2020-07-17 | End: 2020-07-17

## 2020-07-17 RX ORDER — COLCHICINE 0.6 MG
0.6 TABLET ORAL EVERY 12 HOURS
Refills: 0 | Status: DISCONTINUED | OUTPATIENT
Start: 2020-07-17 | End: 2020-07-18

## 2020-07-17 RX ORDER — METOPROLOL TARTRATE 50 MG
25 TABLET ORAL ONCE
Refills: 0 | Status: COMPLETED | OUTPATIENT
Start: 2020-07-17 | End: 2020-07-17

## 2020-07-17 RX ORDER — POTASSIUM CHLORIDE 20 MEQ
40 PACKET (EA) ORAL ONCE
Refills: 0 | Status: COMPLETED | OUTPATIENT
Start: 2020-07-17 | End: 2020-07-17

## 2020-07-17 RX ORDER — METOPROLOL TARTRATE 50 MG
5 TABLET ORAL ONCE
Refills: 0 | Status: COMPLETED | OUTPATIENT
Start: 2020-07-17 | End: 2020-07-17

## 2020-07-17 RX ORDER — CLOPIDOGREL BISULFATE 75 MG/1
75 TABLET, FILM COATED ORAL DAILY
Refills: 0 | Status: DISCONTINUED | OUTPATIENT
Start: 2020-07-17 | End: 2020-07-21

## 2020-07-17 RX ORDER — DILTIAZEM HCL 120 MG
10 CAPSULE, EXT RELEASE 24 HR ORAL ONCE
Refills: 0 | Status: COMPLETED | OUTPATIENT
Start: 2020-07-17 | End: 2020-07-17

## 2020-07-17 RX ORDER — MAGNESIUM SULFATE 500 MG/ML
1 VIAL (ML) INJECTION EVERY 12 HOURS
Refills: 0 | Status: DISCONTINUED | OUTPATIENT
Start: 2020-07-17 | End: 2020-07-21

## 2020-07-17 RX ORDER — METOPROLOL TARTRATE 50 MG
50 TABLET ORAL
Refills: 0 | Status: DISCONTINUED | OUTPATIENT
Start: 2020-07-17 | End: 2020-07-18

## 2020-07-17 RX ORDER — IBUPROFEN 200 MG
400 TABLET ORAL EVERY 8 HOURS
Refills: 0 | Status: DISCONTINUED | OUTPATIENT
Start: 2020-07-17 | End: 2020-07-20

## 2020-07-17 RX ADMIN — Medication 0.6 MILLIGRAM(S): at 11:00

## 2020-07-17 RX ADMIN — Medication 600 MILLIGRAM(S): at 17:43

## 2020-07-17 RX ADMIN — Medication 0.6 MILLIGRAM(S): at 17:43

## 2020-07-17 RX ADMIN — Medication 25 MILLIGRAM(S): at 09:36

## 2020-07-17 RX ADMIN — Medication 300 MILLIGRAM(S): at 11:01

## 2020-07-17 RX ADMIN — PANTOPRAZOLE SODIUM 40 MILLIGRAM(S): 20 TABLET, DELAYED RELEASE ORAL at 06:47

## 2020-07-17 RX ADMIN — Medication 400 MILLIGRAM(S): at 22:37

## 2020-07-17 RX ADMIN — SENNA PLUS 1 TABLET(S): 8.6 TABLET ORAL at 17:42

## 2020-07-17 RX ADMIN — Medication 25 MILLIGRAM(S): at 05:51

## 2020-07-17 RX ADMIN — Medication 5 MILLIGRAM(S): at 01:54

## 2020-07-17 RX ADMIN — CLOPIDOGREL BISULFATE 75 MILLIGRAM(S): 75 TABLET, FILM COATED ORAL at 11:01

## 2020-07-17 RX ADMIN — Medication 325 MILLIGRAM(S): at 11:01

## 2020-07-17 RX ADMIN — Medication 400 MILLIGRAM(S): at 17:48

## 2020-07-17 RX ADMIN — Medication 50 MILLIGRAM(S): at 18:06

## 2020-07-17 RX ADMIN — SENNA PLUS 1 TABLET(S): 8.6 TABLET ORAL at 05:51

## 2020-07-17 RX ADMIN — MUPIROCIN 1 APPLICATION(S): 20 OINTMENT TOPICAL at 18:06

## 2020-07-17 RX ADMIN — Medication 600 MILLIGRAM(S): at 11:01

## 2020-07-17 RX ADMIN — Medication 40 MILLIEQUIVALENT(S): at 09:36

## 2020-07-17 RX ADMIN — Medication 10 MILLIGRAM(S): at 17:50

## 2020-07-17 RX ADMIN — Medication 40 MILLIGRAM(S): at 05:53

## 2020-07-17 RX ADMIN — ATORVASTATIN CALCIUM 40 MILLIGRAM(S): 80 TABLET, FILM COATED ORAL at 22:37

## 2020-07-17 RX ADMIN — POLYETHYLENE GLYCOL 3350 17 GRAM(S): 17 POWDER, FOR SOLUTION ORAL at 11:00

## 2020-07-17 RX ADMIN — Medication 2: at 12:05

## 2020-07-17 RX ADMIN — MUPIROCIN 1 APPLICATION(S): 20 OINTMENT TOPICAL at 05:54

## 2020-07-17 RX ADMIN — Medication 100 GRAM(S): at 09:35

## 2020-07-17 RX ADMIN — Medication 100 GRAM(S): at 17:42

## 2020-07-17 RX ADMIN — ENOXAPARIN SODIUM 40 MILLIGRAM(S): 100 INJECTION SUBCUTANEOUS at 22:38

## 2020-07-17 NOTE — PROGRESS NOTE ADULT - SUBJECTIVE AND OBJECTIVE BOX
OPERATIVE PROCEDURE(s):    CABG x 5            POD #   2                    69yMale  SURGEON(s): AGUSTIN Snell  SUBJECTIVE ASSESSMENT:  Patient has no complaints at this time.    Vital Signs Last 24 Hrs  T(F): 98.7 (2020 07:20), Max: 99 (2020 16:00)  HR: 113 (2020 08:18) (100 - 116) ST  BP: 135/61 (2020 08:18) (92/51 - 165/91)  BP(mean): 88 (2020 08:18) (68 - 123)  ABP: 111/49 (2020 13:00) (105/48 - 130/57)  ABP(mean): 68 (2020 13:00)  RR: 38 (2020 08:18) (20 - 38)  SpO2: 96% (2020 08:18) (94% - 98%) 4LNC  CVP(mm Hg): 13 (2020 10:00)  CO: 8.2 (2020 10:00)  CI: 3.6 (2020 10:00)  PA: 40/16 (2020 10:00)  SVR: 633 (2020 10:00)    I&O's Detail    :01  -  2020 07:00  --------------------------------------------------------  IN:    insulin regular Infusion: 2 mL    nitroglycerin  Infusion: 5 mL    Oral Fluid: 720 mL    sodium chloride 0.9%: 60 mL  Total IN: 787 mL    OUT:    Chest Tube: 26 mL    Indwelling Catheter - Urethral: 420 mL    Voided: 2300 mL  Total OUT: 2746 mL        Net:   I&O's Detail    15 Jul 2020 07:  -  2020 07:00  --------------------------------------------------------  Total NET: 418.5 mL      :  -  2020 07:00  --------------------------------------------------------  Total NET: -1959 mL        CAPILLARY BLOOD GLUCOSE  111 (2020 05:30)  110 (2020 03:30)      POCT Blood Glucose.: 129 mg/dL (2020 06:56)  POCT Blood Glucose.: 111 mg/dL (2020 21:23)  POCT Blood Glucose.: 125 mg/dL (2020 16:10)  POCT Blood Glucose.: 108 mg/dL (2020 11:16)    Physical Exam:  General: NAD; A&Ox3  Cardiac: S1/S2, RRR, no murmur, no rubs  Lungs: unlabored respirations, CTA b/l, no wheeze, no rales, no crackles  Abdomen: Soft/NT/ND; positive bowel sounds x 4  Sternum: Intact, no click, incision healing well with no drainage  Incisions: Incisions clean/dry/intact  Extremities: No edema b/l lower extremities; good capillary refill; no cyanosis; palpable 1+ pedal pulses b/l    Central Venous Catheter: Yes[x]  No[] , If Yes indication:  unstable         Day #2  Grigsby Catheter: Yes  [] , No  [x] , If yes indication:                      Day #  NGT: Yes [] No [x] ,    If Yes Placement:                                     Day #  EPICARDIAL WIRES:  [x] YES [] NO                  Atrial only                            Day #2  BOWEL MOVEMENT:  [x] YES [] NO, If No, Timing since last BM:      Day #  CHEST TUBE(Left/Right):  [] YES [x] NO, If yes -  AIR LEAKS:  [] YES [] NO        LABS:                        10.9<L>  11.88<H> )-----------( 125<L>    ( 2020 00:50 )             34.0<L>                        10.9<L>  10.43 )-----------( 118<L>    ( 2020 02:00 )             33.6<L>    07-17    141  |  104  |  16  ----------------------------<  131<H>  4.0   |  24  |  0.8      140  |  107  |  16  ----------------------------<  112<H>  4.6   |  21  |  0.9    Ca    8.7      2020 00:50  Mg     1.8         TPro  6.0 [6.0 - 8.0]  /  Alb  4.4 [3.5 - 5.2]  /  TBili  1.6<H> [0.2 - 1.2]  /  DBili  x   /  AST  48<H> [0 - 41]  /  ALT  37 [0 - 41]  /  AlkPhos  28<L> [30 - 115]      PT/INR - ( 2020 02:00 )   PT: ;   INR: 1.13 ratio         PT/INR - ( 15 Jul 2020 14:43 )   PT: ;   INR: 1.22 ratio         PTT - ( 2020 02:00 )  PTT:25.2 sec, PTT - ( 15 Jul 2020 14:43 )  PTT:24.5 sec  Urinalysis Basic - ( 2020 18:00 )    Color: Yellow / Appearance: Clear / SG: >1.050 / pH: x  Gluc: x / Ketone: Negative  / Bili: Negative / Urobili: <2 mg/dL   Blood: x / Protein: 100 mg/dL / Nitrite: Negative   Leuk Esterase: Negative / RBC: 2 /HPF / WBC 2 /HPF   Sq Epi: x / Non Sq Epi: 1 /HPF / Bacteria: Negative      ABG - ( 2020 11:44 )  pH: 7.43  /  pCO2: 34    /  pO2: 82    / HCO3: 23    / Base Excess: -1.2  /  SaO2: 94    /  LA: 1.2              RADIOLOGY & ADDITIONAL TESTS:  EK Lead ECG:   Ventricular Rate 107 BPM    Atrial Rate 107 BPM    P-R Interval 170 ms    QRS Duration 84 ms    Q-T Interval 348 ms    QTC Calculation(Bezet) 464 ms    P Axis 34 degrees    R Axis 18 degrees    T Axis 25 degrees    Diagnosis Line Sinus tachycardia  Possible Left atrial enlargement  Inferior infarct , possibly acute  ** ** ACUTE MI / STEMI ** **  Abnormal ECG    Confirmed by GISELLE BEAN MD ([id number]) on 2020 9:12:26 AM (20 @ 07:22)    MEDICATIONS  (STANDING):  albuterol/ipratropium for Nebulization 3 milliLiter(s) Nebulizer every 6 hours  allopurinol 300 milliGRAM(s) Oral daily  aspirin enteric coated 325 milliGRAM(s) Oral daily  atorvastatin 40 milliGRAM(s) Oral at bedtime  dextrose 5%. 1000 milliLiter(s) (50 mL/Hr) IV Continuous <Continuous>  dextrose 50% Injectable 50 milliLiter(s) IV Push every 15 minutes  dextrose 50% Injectable 25 milliLiter(s) IV Push every 15 minutes  enoxaparin Injectable 40 milliGRAM(s) SubCutaneous at bedtime  insulin lispro (HumaLOG) corrective regimen sliding scale   SubCutaneous three times a day before meals  metoprolol tartrate 25 milliGRAM(s) Oral two times a day  mupirocin 2% Ointment 1 Application(s) Topical every 12 hours  pantoprazole    Tablet 40 milliGRAM(s) Oral before breakfast  polyethylene glycol 3350 17 Gram(s) Oral daily  senna 1 Tablet(s) Oral every 12 hours    MEDICATIONS  (PRN):  dextrose 40% Gel 15 Gram(s) Oral once PRN Blood Glucose LESS THAN 70 milliGRAM(s)/deciliter  glucagon  Injectable 1 milliGRAM(s) IntraMuscular once PRN Glucose LESS THAN 70 milligrams/deciliter  ketorolac   Injectable 15 milliGRAM(s) IV Push every 4 hours PRN Severe Pain (7 - 10)  oxyCODONE    IR 5 milliGRAM(s) Oral every 4 hours PRN Mild Pain (1 - 3)  oxyCODONE    IR 10 milliGRAM(s) Oral every 4 hours PRN Moderate Pain (4 - 6)      LOVENOX:[x] YES [] NO  Dose: 40mg Q24H  SCD's: YES b/l  GI Prophylaxis: Protonix [x], Pepcid [], None [], (Contra-indication:.....)    Post-Op Aspirin: Yes [x],  No [], If No, then contra-indication:  Post-Op Statin: Yes [x], No[], If No, then contra-indication:  Post-Op Beta-Blockers: Yes [x], No [], If No, then contra-indication:    Allergies:  adhesives (Rash)  Keflex (Diarrhea)  methylPREDNISolone (Pruritus)  rash (Rash)      Ambulation/Activity Status: Ambulates several times daily without assistance.    Assessment/Plan:  69y Male status-post CABG  - Case and plan discussed with CTU Intensivist and CT Surgeon - Dr. Koroma/Yesi  - Continue CTU supportive care    - Continue DVT/GI prophylaxis  - Incentive Spirometry 10 times an hour  - Continue to advance physical activity as tolerated and continue PT/OT as directed  1. CAD: Continue ASA, statin, BB; start Plavix today  2. Colchicine for Pericarditis

## 2020-07-17 NOTE — PROGRESS NOTE ADULT - SUBJECTIVE AND OBJECTIVE BOX
CTU Attending Progress Daily Note     17 Jul 2020 09:01    Procedure:         CABG                                         POD#              2     Patient seen as post-op critical care follow-up    HPI:  [69y man]    CC: chest pain    PMH: hypertension, chronic back pain, BPH, GERD, gout, and renal cell carcinoma s/p partial left nephrectomy (9/17/2009)    PSH: Bladder polyp S/p removal (4/4/2019 by Dr. Wylie), TURP (7/27/2005 by Dr. Mendez), laminectomy of L3/L4 (9/11/2002 by Dr. Jara), partial left nephrectomy (9/17/2009 by Dr. Vergara), spinal fusion (L4/5-L5/S1 6/23/2008 by Dr. Jara) with revision of fusion Transome Axial JF (3/1/2020 by Dr. 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 The patient was told the results were abnormal and that he had "a possible blockage." He was scheduled for elective cardiac catheterization with Dr. Fernandes on 7/23/2020. On the day of presentation, the patient had finished eat breakfast ("a bowl of Cheerios for the heart") and was sitting down talking to his wife at home when he began developing back pain behind his left shoulder. He said that the pain was different from the chronic back pain that he usually feels. The pain was 6/10 intensity that did not radiate and was accompanied with chest tightness and nausea. He decided to come to ED, worried that something was wrong. By the time the patient arrived at the hospital, his chest pain had diminished to a 3-4/10 intensity and the nausea had resolved.    In the ED, vital signs were Tmax 98.7F, , /91, RR 16, SpO2 99% on room air. Patient was given aspirin 162mg PO x1 dose. On sign out, ED mentioned possible Q waves on ECG but I did not appreciated them. ECG noted for left anterior fascicular block. Troponin negative x1. Per ED, patient is scheduled in AM for cardiac catheterization with Dr. Fernandes. When I spoke to the patient, the pain had mostly resolved and was now a 1/10. (13 Jul 2020 18:43)    See preop testing chart H&P    Interval event for past 24 hr:  EUSEBIO MORENO  69y had no event.     Current Complains:  EUSEBIO MORENO has no new complaints    REVIEW OF SYSTEMS:  CONSTITUTIONAL:  [-] weakness, [-] fevers, [-] chills  EYES/ENT: [-] visual changes, [-] vertigo, [-] throat pain   NECK: [-] pain, [-] stiffness  RESPIRATORY: [-] cough, [-] wheezing, [-] hemoptysis, [-] shortness of breath  CARDIOVASCULAR: [-] chest pain, [-] palpitations, [-] orthopnea  GASTROINTESTINAL:    [-]abdominal pain, [-] nausea, [-] vomiting, [-] hematemesis, [-] diarrhea, [-] constipation, [-] melena, [-] hematochezia.  GENITOURINARY: [-] dysuria, [-] frequency, [-] hematuria  NEUROLOGICAL: [-] numbness, [-] weakness  SKIN: [-] itching, [-] burning, [-] rashes, [-] lesions   All other review of systems is negative unless indicated above.    [  ] Unable to assess ROS because :    OBJECTIVE:  ICU Vital Signs Last 24 Hrs  T(C): 37.1 (17 Jul 2020 07:20), Max: 37.2 (16 Jul 2020 16:00)  T(F): 98.7 (17 Jul 2020 07:20), Max: 99 (16 Jul 2020 16:00)  HR: 113 (17 Jul 2020 08:18) (100 - 116)  BP: 135/61 (17 Jul 2020 08:18) (92/51 - 165/91)  BP(mean): 88 (17 Jul 2020 08:18) (68 - 123)  ABP: 111/49 (16 Jul 2020 13:00) (105/48 - 130/57)  ABP(mean): 68 (16 Jul 2020 13:00) (65 - 78)  RR: 38 (17 Jul 2020 08:18) (20 - 38)  SpO2: 96% (17 Jul 2020 08:18) (94% - 98%)      I&O's Summary    16 Jul 2020 07:01  -  17 Jul 2020 07:00  --------------------------------------------------------  IN: 787 mL / OUT: 2746 mL / NET: -1959 mL    17 Jul 2020 07:01  -  17 Jul 2020 09:01  --------------------------------------------------------  IN: 120 mL / OUT: 750 mL / NET: -630 mL      Adult Advanced Hemodynamics Last 24 Hrs  CVP(mm Hg): 13 (16 Jul 2020 10:00) (13 - 13)  CVP(cm H2O): --  CO: 8.2 (16 Jul 2020 10:00) (8.2 - 8.2)  CI: 3.6 (16 Jul 2020 10:00) (3.6 - 3.6)  PA: 40/16 (16 Jul 2020 10:00) (40/16 - 40/16)  PA(mean): 25 (16 Jul 2020 10:00) (25 - 25)  PCWP: --  SVR: 633 (16 Jul 2020 10:00) (633 - 633)  SVRI: 1442 (16 Jul 2020 10:00) (1442 - 1442)  PVR: --  PVRI: --      PHYSICAL EXAM:  General: WN/WD NAD    HEENT:     [+] NCAT  [+] EOMI  [-] Conjuctival edema   [-] Icterus   [-] Thrush   [-] ETT  [-] NGT/OGT    Neck:         [+] FROM   [-] JVD     [-] Nodes     [-] Masses    [+] Mid-line trachea    [-] Tracheostomy    Chest:         [-] Sternal click   [-] Sternal drainage   [+] Pacing wires   [-] Chest tubes   [-] SubQ emphysema    Lungs:          [+] CTA   [-] Rhonchi   [-] Rales    [-] Wheezing    [-] Decreased BS    [-] Dullness R L    Cardiac:       [+] S1 [+] S2    [+] RRR   [-] Irregular   [-] S3   [-] S4    [-] Murmurs    [-] Rub    Abdomen:    [+] BS    [+] Soft    [+] Non-tender     [-] Distended    [-] Organomegaly  [-] PEG    Extremities:   [-] Cyanosis U/L   [-] Clubbing  U/L  [-] LE/UE Edema   [+] Capillary refill    [+] Pulses     Neuro:        [+] Awake   [+]  Alert   [-] Confused   [-] Lethargic   [-] Sedated   [-] Generalized Weakness    Skin:        [-] Rashes    [-] Erythema   [+] Normal incisions   [+] IV sites intact   [-] Sacral decubitus    Tubes:  LINES:    CAPILLARY BLOOD GLUCOSE  111 (16 Jul 2020 05:30)      POCT Blood Glucose.: 129 mg/dL (17 Jul 2020 06:56)    CAPILLARY BLOOD GLUCOSE      POCT Blood Glucose.: 129 mg/dL (17 Jul 2020 06:56)  POCT Blood Glucose.: 111 mg/dL (16 Jul 2020 21:23)  POCT Blood Glucose.: 125 mg/dL (16 Jul 2020 16:10)  POCT Blood Glucose.: 108 mg/dL (16 Jul 2020 11:16)      HOSPITAL MEDICATIONS:  MEDICATIONS  (STANDING):  albuterol/ipratropium for Nebulization 3 milliLiter(s) Nebulizer every 6 hours  allopurinol 300 milliGRAM(s) Oral daily  aspirin enteric coated 325 milliGRAM(s) Oral daily  atorvastatin 40 milliGRAM(s) Oral at bedtime  dextrose 5%. 1000 milliLiter(s) (50 mL/Hr) IV Continuous <Continuous>  dextrose 50% Injectable 50 milliLiter(s) IV Push every 15 minutes  dextrose 50% Injectable 25 milliLiter(s) IV Push every 15 minutes  enoxaparin Injectable 40 milliGRAM(s) SubCutaneous at bedtime  insulin lispro (HumaLOG) corrective regimen sliding scale   SubCutaneous three times a day before meals  metoprolol tartrate 25 milliGRAM(s) Oral two times a day  mupirocin 2% Ointment 1 Application(s) Topical every 12 hours  pantoprazole    Tablet 40 milliGRAM(s) Oral before breakfast  polyethylene glycol 3350 17 Gram(s) Oral daily  senna 1 Tablet(s) Oral every 12 hours    MEDICATIONS  (PRN):  dextrose 40% Gel 15 Gram(s) Oral once PRN Blood Glucose LESS THAN 70 milliGRAM(s)/deciliter  glucagon  Injectable 1 milliGRAM(s) IntraMuscular once PRN Glucose LESS THAN 70 milligrams/deciliter  ketorolac   Injectable 15 milliGRAM(s) IV Push every 4 hours PRN Severe Pain (7 - 10)  oxyCODONE    IR 5 milliGRAM(s) Oral every 4 hours PRN Mild Pain (1 - 3)  oxyCODONE    IR 10 milliGRAM(s) Oral every 4 hours PRN Moderate Pain (4 - 6)      LABS:  ABG - ( 16 Jul 2020 11:44 )  pH, Arterial: 7.43  pH, Blood: x     /  pCO2: 34    /  pO2: 82    / HCO3: 23    / Base Excess: -1.2  /  SaO2: 94                                      10.9   11.88 )-----------( 125      ( 17 Jul 2020 00:50 )             34.0     07-17    141  |  104  |  16  ----------------------------<  131<H>  4.0   |  24  |  0.8    Ca    8.7      17 Jul 2020 00:50  Mg     1.8     07-17    TPro  6.0  /  Alb  4.4  /  TBili  1.6<H>  /  DBili  x   /  AST  48<H>  /  ALT  37  /  AlkPhos  28<L>  07-16    PT/INR - ( 16 Jul 2020 02:00 )   PT: 13.00 sec;   INR: 1.13 ratio         PTT - ( 16 Jul 2020 02:00 )  PTT:25.2 sec        RADIOLOGY:  Reviewed and interpreted by me  CXR from 07-17-20 shows [+] mild congestion, [-] pneumothorax, [-] R/L effusion, [-] cardiomegaly,       ECG:  Reviewed and interpreted by me:   QTC:    Assessment:    CAD SP CABG    PAST MEDICAL & SURGICAL HISTORY:  BPH (benign prostatic hyperplasia)  GERD (gastroesophageal reflux disease)  Back pain  HTN (hypertension)  Gout  Renal cancer, left: Renal cell carcinoma s/p partial left nephrectomy (20% removed)  Bladder polyp: S/p removal 4/4/2019 by Dr. Wylie  H/O spinal fusion: L4/5-L5/S1 6/23/2008 by Dr. Jara with revision of fusion Transome Axial JF 3/1/2020 by Dr. Jara  H/O laminectomy: L3/L4 9/11/2002 by Dr. Marek [place] tumor: Removed 2/3/2006 by Dr. Prieto  H/O cystoscopy: TURP 7/27/2005 by Dr. Mendez  H/O partial nephrectomy: 9/17/2009 by Dr. Vergara      PLAN:  Neuro: Pain control  Pulm: Encourage coughing, deep breathing and use of incentive spirometry. Wean off supplemental oxygen as able. Daily CXR.   Cardio: Monitor telemetry/alarms. Continue cardiac meds  GI: Tolerating diet. Continue stool softeners. Continue GI prophylaxis  Renal: monitor urine output, supplement electrolytes as needed  Vasc: Heparin SC/SCDs for DVT prophylaxis  Heme: Monitor H/H.   ID: Off antibiotics. Stable.  Endocrine: Monitor finger stick blood sugar and control hyperglycemia with insulin  Physical Therapy: OOB/ambulate        Discussed with Cardiothoracic Team at AM rounds.

## 2020-07-18 LAB
ANION GAP SERPL CALC-SCNC: 13 MMOL/L — SIGNIFICANT CHANGE UP (ref 7–14)
BASOPHILS # BLD AUTO: 0.02 K/UL — SIGNIFICANT CHANGE UP (ref 0–0.2)
BASOPHILS NFR BLD AUTO: 0.2 % — SIGNIFICANT CHANGE UP (ref 0–1)
BUN SERPL-MCNC: 24 MG/DL — HIGH (ref 10–20)
CALCIUM SERPL-MCNC: 8.6 MG/DL — SIGNIFICANT CHANGE UP (ref 8.5–10.1)
CHLORIDE SERPL-SCNC: 104 MMOL/L — SIGNIFICANT CHANGE UP (ref 98–110)
CO2 SERPL-SCNC: 27 MMOL/L — SIGNIFICANT CHANGE UP (ref 17–32)
CREAT SERPL-MCNC: 0.9 MG/DL — SIGNIFICANT CHANGE UP (ref 0.7–1.5)
EOSINOPHIL # BLD AUTO: 0.18 K/UL — SIGNIFICANT CHANGE UP (ref 0–0.7)
EOSINOPHIL NFR BLD AUTO: 1.4 % — SIGNIFICANT CHANGE UP (ref 0–8)
GLUCOSE BLDC GLUCOMTR-MCNC: 115 MG/DL — HIGH (ref 70–99)
GLUCOSE BLDC GLUCOMTR-MCNC: 116 MG/DL — HIGH (ref 70–99)
GLUCOSE SERPL-MCNC: 122 MG/DL — HIGH (ref 70–99)
HCT VFR BLD CALC: 34.6 % — LOW (ref 42–52)
HGB BLD-MCNC: 11.1 G/DL — LOW (ref 14–18)
IMM GRANULOCYTES NFR BLD AUTO: 0.6 % — HIGH (ref 0.1–0.3)
LYMPHOCYTES # BLD AUTO: 1.32 K/UL — SIGNIFICANT CHANGE UP (ref 1.2–3.4)
LYMPHOCYTES # BLD AUTO: 10.6 % — LOW (ref 20.5–51.1)
MAGNESIUM SERPL-MCNC: 2.4 MG/DL — SIGNIFICANT CHANGE UP (ref 1.8–2.4)
MCHC RBC-ENTMCNC: 30.9 PG — SIGNIFICANT CHANGE UP (ref 27–31)
MCHC RBC-ENTMCNC: 32.1 G/DL — SIGNIFICANT CHANGE UP (ref 32–37)
MCV RBC AUTO: 96.4 FL — HIGH (ref 80–94)
MONOCYTES # BLD AUTO: 1.23 K/UL — HIGH (ref 0.1–0.6)
MONOCYTES NFR BLD AUTO: 9.9 % — HIGH (ref 1.7–9.3)
NEUTROPHILS # BLD AUTO: 9.66 K/UL — HIGH (ref 1.4–6.5)
NEUTROPHILS NFR BLD AUTO: 77.3 % — HIGH (ref 42.2–75.2)
NRBC # BLD: 0 /100 WBCS — SIGNIFICANT CHANGE UP (ref 0–0)
PLATELET # BLD AUTO: 133 K/UL — SIGNIFICANT CHANGE UP (ref 130–400)
POTASSIUM SERPL-MCNC: 3.7 MMOL/L — SIGNIFICANT CHANGE UP (ref 3.5–5)
POTASSIUM SERPL-SCNC: 3.7 MMOL/L — SIGNIFICANT CHANGE UP (ref 3.5–5)
RBC # BLD: 3.59 M/UL — LOW (ref 4.7–6.1)
RBC # FLD: 14.5 % — SIGNIFICANT CHANGE UP (ref 11.5–14.5)
SODIUM SERPL-SCNC: 144 MMOL/L — SIGNIFICANT CHANGE UP (ref 135–146)
WBC # BLD: 12.48 K/UL — HIGH (ref 4.8–10.8)
WBC # FLD AUTO: 12.48 K/UL — HIGH (ref 4.8–10.8)

## 2020-07-18 PROCEDURE — 99232 SBSQ HOSP IP/OBS MODERATE 35: CPT

## 2020-07-18 PROCEDURE — 93010 ELECTROCARDIOGRAM REPORT: CPT

## 2020-07-18 PROCEDURE — 71045 X-RAY EXAM CHEST 1 VIEW: CPT | Mod: 26

## 2020-07-18 RX ORDER — POTASSIUM CHLORIDE 20 MEQ
40 PACKET (EA) ORAL ONCE
Refills: 0 | Status: COMPLETED | OUTPATIENT
Start: 2020-07-18 | End: 2020-07-18

## 2020-07-18 RX ORDER — DILTIAZEM HCL 120 MG
15 CAPSULE, EXT RELEASE 24 HR ORAL ONCE
Refills: 0 | Status: COMPLETED | OUTPATIENT
Start: 2020-07-18 | End: 2020-07-18

## 2020-07-18 RX ORDER — METOPROLOL TARTRATE 50 MG
50 TABLET ORAL EVERY 6 HOURS
Refills: 0 | Status: DISCONTINUED | OUTPATIENT
Start: 2020-07-18 | End: 2020-07-18

## 2020-07-18 RX ORDER — METOPROLOL TARTRATE 50 MG
50 TABLET ORAL EVERY 6 HOURS
Refills: 0 | Status: DISCONTINUED | OUTPATIENT
Start: 2020-07-18 | End: 2020-07-19

## 2020-07-18 RX ADMIN — CLOPIDOGREL BISULFATE 75 MILLIGRAM(S): 75 TABLET, FILM COATED ORAL at 11:56

## 2020-07-18 RX ADMIN — Medication 0.6 MILLIGRAM(S): at 06:50

## 2020-07-18 RX ADMIN — Medication 400 MILLIGRAM(S): at 06:50

## 2020-07-18 RX ADMIN — Medication 100 GRAM(S): at 18:48

## 2020-07-18 RX ADMIN — Medication 400 MILLIGRAM(S): at 23:00

## 2020-07-18 RX ADMIN — Medication 300 MILLIGRAM(S): at 11:57

## 2020-07-18 RX ADMIN — MUPIROCIN 1 APPLICATION(S): 20 OINTMENT TOPICAL at 18:49

## 2020-07-18 RX ADMIN — PANTOPRAZOLE SODIUM 40 MILLIGRAM(S): 20 TABLET, DELAYED RELEASE ORAL at 06:50

## 2020-07-18 RX ADMIN — MUPIROCIN 1 APPLICATION(S): 20 OINTMENT TOPICAL at 06:50

## 2020-07-18 RX ADMIN — Medication 100 GRAM(S): at 06:49

## 2020-07-18 RX ADMIN — ENOXAPARIN SODIUM 40 MILLIGRAM(S): 100 INJECTION SUBCUTANEOUS at 23:00

## 2020-07-18 RX ADMIN — Medication 600 MILLIGRAM(S): at 06:50

## 2020-07-18 RX ADMIN — Medication 50 MILLIGRAM(S): at 23:00

## 2020-07-18 RX ADMIN — Medication 15 MILLIGRAM(S): at 06:49

## 2020-07-18 RX ADMIN — SENNA PLUS 1 TABLET(S): 8.6 TABLET ORAL at 18:49

## 2020-07-18 RX ADMIN — Medication 3 MILLILITER(S): at 08:43

## 2020-07-18 RX ADMIN — Medication 600 MILLIGRAM(S): at 18:48

## 2020-07-18 RX ADMIN — Medication 50 MILLIGRAM(S): at 18:48

## 2020-07-18 RX ADMIN — Medication 40 MILLIEQUIVALENT(S): at 06:51

## 2020-07-18 RX ADMIN — Medication 50 MILLIGRAM(S): at 06:50

## 2020-07-18 RX ADMIN — Medication 325 MILLIGRAM(S): at 11:57

## 2020-07-18 RX ADMIN — Medication 400 MILLIGRAM(S): at 14:59

## 2020-07-18 RX ADMIN — Medication 50 MILLIGRAM(S): at 11:57

## 2020-07-18 RX ADMIN — ATORVASTATIN CALCIUM 40 MILLIGRAM(S): 80 TABLET, FILM COATED ORAL at 23:00

## 2020-07-18 NOTE — PROGRESS NOTE ADULT - SUBJECTIVE AND OBJECTIVE BOX
CTU Attending Progress Daily Note     18 Jul 2020 10:58    Procedure:          CABG                                        POD#       3            Patient seen as post-op critical care follow-up    HPI:  [69y man]    CC: chest pain    PMH: hypertension, chronic back pain, BPH, GERD, gout, and renal cell carcinoma s/p partial left nephrectomy (9/17/2009)    PSH: Bladder polyp S/p removal (4/4/2019 by Dr. Wylie), TURP (7/27/2005 by Dr. Mendez), laminectomy of L3/L4 (9/11/2002 by Dr. Jara), partial left nephrectomy (9/17/2009 by Dr. Vergara), spinal fusion (L4/5-L5/S1 6/23/2008 by Dr. Jara) with revision of fusion Transome Axial JF (3/1/2020 by Dr. 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 The patient was told the results were abnormal and that he had "a possible blockage." He was scheduled for elective cardiac catheterization with Dr. Fernandes on 7/23/2020. On the day of presentation, the patient had finished eat breakfast ("a bowl of Cheerios for the heart") and was sitting down talking to his wife at home when he began developing back pain behind his left shoulder. He said that the pain was different from the chronic back pain that he usually feels. The pain was 6/10 intensity that did not radiate and was accompanied with chest tightness and nausea. He decided to come to ED, worried that something was wrong. By the time the patient arrived at the hospital, his chest pain had diminished to a 3-4/10 intensity and the nausea had resolved.    In the ED, vital signs were Tmax 98.7F, , /91, RR 16, SpO2 99% on room air. Patient was given aspirin 162mg PO x1 dose. On sign out, ED mentioned possible Q waves on ECG but I did not appreciated them. ECG noted for left anterior fascicular block. Troponin negative x1. Per ED, patient is scheduled in AM for cardiac catheterization with Dr. Fernandes. When I spoke to the patient, the pain had mostly resolved and was now a 1/10. (13 Jul 2020 18:43)    See preop testing chart H&P    Interval event for past 24 hr:  EUSEBIO MORENO  69y had no event.     Current Complains:  EUSEBIO MORENO has no new complaints    REVIEW OF SYSTEMS:  CONSTITUTIONAL:  [-] weakness, [-] fevers, [-] chills  EYES/ENT: [-] visual changes, [-] vertigo, [-] throat pain   NECK: [-] pain, [-] stiffness  RESPIRATORY: [-] cough, [-] wheezing, [-] hemoptysis, [-] shortness of breath  CARDIOVASCULAR: [-] chest pain, [-] palpitations, [-] orthopnea  GASTROINTESTINAL:    [-]abdominal pain, [-] nausea, [-] vomiting, [-] hematemesis, [-] diarrhea, [-] constipation, [-] melena, [-] hematochezia.  GENITOURINARY: [-] dysuria, [-] frequency, [-] hematuria  NEUROLOGICAL: [-] numbness, [-] weakness  SKIN: [-] itching, [-] burning, [-] rashes, [-] lesions   All other review of systems is negative unless indicated above.    [  ] Unable to assess ROS because :    OBJECTIVE:  ICU Vital Signs Last 24 Hrs  T(C): 37.2 (17 Jul 2020 20:00), Max: 37.2 (17 Jul 2020 20:00)  T(F): 99 (17 Jul 2020 20:00), Max: 99 (17 Jul 2020 20:00)  HR: 100 (18 Jul 2020 09:57) (81 - 117)  BP: 93/59 (18 Jul 2020 09:57) (91/62 - 129/60)  BP(mean): 73 (18 Jul 2020 09:57) (67 - 88)  ABP: --  ABP(mean): --  RR: 36 (18 Jul 2020 09:57) (20 - 36)  SpO2: 94% (18 Jul 2020 09:57) (90% - 98%)      I&O's Summary    17 Jul 2020 07:01  -  18 Jul 2020 07:00  --------------------------------------------------------  IN: 1440 mL / OUT: 2100 mL / NET: -660 mL      PHYSICAL EXAM:  General: WN/WD NAD    HEENT:     [+] NCAT  [+] EOMI  [-] Conjuctival edema   [-] Icterus   [-] Thrush   [-] ETT  [-] NGT/OGT    Neck:         [+] FROM   [-] JVD     [-] Nodes     [-] Masses    [+] Mid-line trachea    [-] Tracheostomy    Chest:         [-] Sternal click   [-] Sternal drainage   [+] Pacing wires   [-] Chest tubes   [-] SubQ emphysema    Lungs:          [+] CTA   [-] Rhonchi   [-] Rales    [-] Wheezing    [-] Decreased BS    [-] Dullness R L    Cardiac:       [+] S1 [+] S2    [+] RRR   [-] Irregular   [-] S3   [-] S4    [-] Murmurs    [-] Rub    Abdomen:    [+] BS    [+] Soft    [+] Non-tender     [-] Distended    [-] Organomegaly  [-] PEG    Extremities:   [-] Cyanosis U/L   [-] Clubbing  U/L  [-] LE/UE Edema   [+] Capillary refill    [+] Pulses     Neuro:        [+] Awake   [+]  Alert   [-] Confused   [-] Lethargic   [-] Sedated   [-] Generalized Weakness    Skin:        [-] Rashes    [-] Erythema   [+] Normal incisions   [+] IV sites intact   [-] Sacral decubitus    Tubes:  LINES:    CAPILLARY BLOOD GLUCOSE      POCT Blood Glucose.: 115 mg/dL (18 Jul 2020 07:02)    CAPILLARY BLOOD GLUCOSE      POCT Blood Glucose.: 115 mg/dL (18 Jul 2020 07:02)  POCT Blood Glucose.: 131 mg/dL (17 Jul 2020 15:29)  POCT Blood Glucose.: 156 mg/dL (17 Jul 2020 11:06)      HOSPITAL MEDICATIONS:  MEDICATIONS  (STANDING):  albuterol/ipratropium for Nebulization 3 milliLiter(s) Nebulizer every 6 hours  allopurinol 300 milliGRAM(s) Oral daily  aspirin enteric coated 325 milliGRAM(s) Oral daily  atorvastatin 40 milliGRAM(s) Oral at bedtime  clopidogrel Tablet 75 milliGRAM(s) Oral daily  dextrose 5%. 1000 milliLiter(s) (50 mL/Hr) IV Continuous <Continuous>  dextrose 50% Injectable 50 milliLiter(s) IV Push every 15 minutes  dextrose 50% Injectable 25 milliLiter(s) IV Push every 15 minutes  enoxaparin Injectable 40 milliGRAM(s) SubCutaneous at bedtime  guaiFENesin  milliGRAM(s) Oral every 12 hours  ibuprofen  Tablet. 400 milliGRAM(s) Oral every 8 hours  insulin lispro (HumaLOG) corrective regimen sliding scale   SubCutaneous three times a day before meals  magnesium sulfate  IVPB 1 Gram(s) IV Intermittent every 12 hours  metoprolol tartrate 50 milliGRAM(s) Oral every 6 hours  mupirocin 2% Ointment 1 Application(s) Topical every 12 hours  pantoprazole    Tablet 40 milliGRAM(s) Oral before breakfast  polyethylene glycol 3350 17 Gram(s) Oral daily  senna 1 Tablet(s) Oral every 12 hours    MEDICATIONS  (PRN):  dextrose 40% Gel 15 Gram(s) Oral once PRN Blood Glucose LESS THAN 70 milliGRAM(s)/deciliter  glucagon  Injectable 1 milliGRAM(s) IntraMuscular once PRN Glucose LESS THAN 70 milligrams/deciliter  oxyCODONE    IR 5 milliGRAM(s) Oral every 4 hours PRN Mild Pain (1 - 3)  oxyCODONE    IR 10 milliGRAM(s) Oral every 4 hours PRN Moderate Pain (4 - 6)      LABS:  ABG - ( 16 Jul 2020 11:44 )  pH, Arterial: 7.43  pH, Blood: x     /  pCO2: 34    /  pO2: 82    / HCO3: 23    / Base Excess: -1.2  /  SaO2: 94                                      11.1   12.48 )-----------( 133      ( 18 Jul 2020 02:47 )             34.6     07-18    144  |  104  |  24<H>  ----------------------------<  122<H>  3.7   |  27  |  0.9    Ca    8.6      18 Jul 2020 02:47  Mg     2.4     07-18              RADIOLOGY:  Reviewed and interpreted by me  CXR from 07-18-20 shows [+] mild congestion, [-] pneumothorax, [-] R/L effusion, [-] cardiomegaly,       ECG:  Reviewed and interpreted by me: SR  QTC:    Assessment:  CAD SP CABG    PAST MEDICAL & SURGICAL HISTORY:  BPH (benign prostatic hyperplasia)  GERD (gastroesophageal reflux disease)  Back pain  HTN (hypertension)  Gout  Renal cancer, left: Renal cell carcinoma s/p partial left nephrectomy (20% removed)  Bladder polyp: S/p removal 4/4/2019 by Dr. Wylie  H/O spinal fusion: L4/5-L5/S1 6/23/2008 by Dr. Jara with revision of fusion Transome Axial JF 3/1/2020 by Dr. Jara  H/O laminectomy: L3/L4 9/11/2002 by Dr. Jara  Warthin's tumor: Removed 2/3/2006 by Dr. Prieto  H/O cystoscopy: TURP 7/27/2005 by Dr. Mendez  H/O partial nephrectomy: 9/17/2009 by Dr. Vergara      PLAN:  Neuro: Pain control  Pulm: Encourage coughing, deep breathing and use of incentive spirometry. Wean off supplemental oxygen as able. Daily CXR.   Cardio: Monitor telemetry/alarms. Continue cardiac meds  GI: Tolerating diet. Continue stool softeners. Continue GI prophylaxis  Renal: monitor urine output, supplement electrolytes as needed  Vasc: Heparin SC/SCDs for DVT prophylaxis  Heme: Monitor H/H.   ID: Off antibiotics. Stable.  Endocrine: Monitor finger stick blood sugar and control hyperglycemia with insulin  Physical Therapy: OOB/ambulate        Discussed with Cardiothoracic Team at AM rounds.

## 2020-07-18 NOTE — PROGRESS NOTE ADULT - SUBJECTIVE AND OBJECTIVE BOX
OPERATIVE PROCEDURE(s):     cabg x 5           POD # 3    SURGEON(s): rolan      SUBJECTIVE ASSESSMENT: no complaints    Vital Signs Last 24 Hrs  T(C): 36.9 (18 Jul 2020 20:00), Max: 36.9 (18 Jul 2020 20:00)  T(F): 98.5 (18 Jul 2020 20:00), Max: 98.5 (18 Jul 2020 20:00)  HR: 100 (19 Jul 2020 10:13) (87 - 103)  BP: 122/90 (19 Jul 2020 10:13) (105/58 - 133/71)  BP(mean): 103 (19 Jul 2020 10:13) (74 - 103)  RR: 21 (19 Jul 2020 10:13) (19 - 29)  SpO2: 95% (19 Jul 2020 10:13) (93% - 97%)  07-18-20 @ 07:01  -  07-19-20 @ 07:00  --------------------------------------------------------  IN: 720 mL / OUT: 1550 mL / NET: -830 mL    07-19-20 @ 07:01  -  07-19-20 @ 13:58  --------------------------------------------------------  IN: 0 mL / OUT: 400 mL / NET: -400 mL        Physical Exam  General: alert and oriented x 3  Chest: cta bl  CVS: s1s2  Abd: pos bs soft nt  GI/ :  Ext: trace edema      Central Venous Catheter: Yes[ ]  No[ x] , If Yes indication:           Day #    Grigsby Catheter: Yes  [ ] , No [ x] : If yes indication:                         Day #    NGT: Yes [ ] No [ x ]     If Yes Placement:                                          Day #    Post-Op Beta-Blockers: Yes [ x], No[ ], If No, then contraindication:    CHEST TUBE:  [ ] YES [x ] NO  OUTPUT:     per 24 hours    AIR LEAKS:  [ ] YES [ ] NO      EPICARDIAL WIRES:  [x] YES [ ] NO      BOWEL MOVEMENT:  [x ] YES [ ] NO          LABS:                 Mean Cell Volume: 96.4 fL (07.18.20 @ 02:47)    Complete Blood Count + Automated Diff (07.18.20 @ 02:47)    WBC Count: 12.48 K/uL    RBC Count: 3.59 M/uL    Hemoglobin: 11.1 g/dL    Hematocrit: 34.6 %    Mean Cell Volume: 96.4 fL    Mean Cell Hemoglobin: 30.9 pg    Mean Cell Hemoglobin Conc: 32.1 g/dL    Red Cell Distrib Width: 14.5 %    Platelet Count - Automated: 133 K/uL    Auto Neutrophil #: 9.66 K/uL    Auto Lymphocyte #: 1.32 K/uL    Auto Monocyte #: 1.23 K/uL    Auto Eosinophil #: 0.18 K/uL    Auto Basophil #: 0.02 K/uL    Auto Neutrophil %: 77.3: Differential percentages must be correlated with absolute numbers for  clinical significance. %    Auto Lymphocyte %: 10.6 %    Auto Monocyte %: 9.9 %    Auto Eosinophil %: 1.4 %    Auto Basophil %: 0.2 %    Auto Immature Granulocyte %: 0.6 %    Nucleated RBC: 0 /100 WBCs      COUMADIN:   [ ] YES [x ] NO      Basic Metabolic Panel (07.18.20 @ 02:47)    Sodium, Serum: 144 mmol/L    Potassium, Serum: 3.7 mmol/L    Chloride, Serum: 104 mmol/L    Carbon Dioxide, Serum: 27 mmol/L    Anion Gap, Serum: 13 mmol/L    Blood Urea Nitrogen, Serum: 24 mg/dL    Creatinine, Serum: 0.9 mg/dL    Glucose, Serum: 122 mg/dL    Calcium, Total Serum: 8.6 mg/dL        MEDICATIONS  (STANDING):  albuterol/ipratropium for Nebulization 3 milliLiter(s) Nebulizer every 6 hours  allopurinol 300 milliGRAM(s) Oral daily  aspirin enteric coated 325 milliGRAM(s) Oral daily  atorvastatin 40 milliGRAM(s) Oral at bedtime  clopidogrel Tablet 75 milliGRAM(s) Oral daily  dextrose 5%. 1000 milliLiter(s) (50 mL/Hr) IV Continuous <Continuous>  enoxaparin Injectable 40 milliGRAM(s) SubCutaneous at bedtime  guaiFENesin  milliGRAM(s) Oral every 12 hours  ibuprofen  Tablet. 400 milliGRAM(s) Oral every 8 hours  magnesium sulfate  IVPB 1 Gram(s) IV Intermittent every 12 hours  metoprolol tartrate 50 milliGRAM(s) Oral every 8 hours  mupirocin 2% Ointment 1 Application(s) Topical every 12 hours  pantoprazole    Tablet 40 milliGRAM(s) Oral before breakfast  polyethylene glycol 3350 17 Gram(s) Oral daily  senna 1 Tablet(s) Oral every 12 hours    MEDICATIONS  (PRN):  glucagon  Injectable 1 milliGRAM(s) IntraMuscular once PRN Glucose LESS THAN 70 milligrams/deciliter  oxyCODONE    IR 5 milliGRAM(s) Oral every 4 hours PRN Mild Pain (1 - 3)  oxyCODONE    IR 10 milliGRAM(s) Oral every 4 hours PRN Moderate Pain (4 - 6)      Allergies    adhesives (Rash)  Keflex (Diarrhea)  methylPREDNISolone (Pruritus)  rash (Rash)    Intolerances        Ambulation/Activity Status:    amb well without complaints    RADIOLOGY & ADDITIONAL TESTS:  Xray Chest 2 Views PA/Lat: AM   Indication: Shortness of Breath  Transport: Walking,  w/ Monitor (07-19-20 @ 09:46)    EXAM:  XR CHEST PORTABLE ROUTINE 1V            PROCEDURE DATE:  07/18/2020      INTERPRETATION:  Clinical History / Reason for exam: Status post cardiac surgery.    Comparison : Chest radiograph July 17, 2020.    Technique/Positioning: Adequate.    Findings:    Support devices: Right IJ line with its tip overlying the SVC.    Cardiac/mediastinum/hilum: The patient is status post a median sternotomy and cardiomegaly, unchanged.    Lung parenchyma/Pleura: Bibasilar opacities, unchanged. No pneumothorax is seen.    Skeleton/soft tissues: Stable    Impression:      Status post a median sternotomy and cardiomegaly, unchanged.    Bibasilar opacities, unchanged.    Right IJ line.      Assessment/Plan:  Patient is a 68 yo male s/p cabg x 5 pod # 3  cont present tx as per ct surgeon  s/p cabg - cont asa, statin, and beta blocker  dvt/gi ppx  d/c colchicine due to multiple BM's  cont motrin for pericarditis  encourage is and ambulation    Social Service Disposition:  home prob on monday

## 2020-07-19 LAB
GLUCOSE BLDC GLUCOMTR-MCNC: 103 MG/DL — HIGH (ref 70–99)
HCT VFR BLD CALC: 35.7 % — LOW (ref 42–52)
HGB BLD-MCNC: 11.6 G/DL — LOW (ref 14–18)
MCHC RBC-ENTMCNC: 31.9 PG — HIGH (ref 27–31)
MCHC RBC-ENTMCNC: 32.5 G/DL — SIGNIFICANT CHANGE UP (ref 32–37)
MCV RBC AUTO: 98.1 FL — HIGH (ref 80–94)
NRBC # BLD: 0 /100 WBCS — SIGNIFICANT CHANGE UP (ref 0–0)
PLATELET # BLD AUTO: 226 K/UL — SIGNIFICANT CHANGE UP (ref 130–400)
RBC # BLD: 3.64 M/UL — LOW (ref 4.7–6.1)
RBC # FLD: 14.4 % — SIGNIFICANT CHANGE UP (ref 11.5–14.5)
WBC # BLD: 9.4 K/UL — SIGNIFICANT CHANGE UP (ref 4.8–10.8)
WBC # FLD AUTO: 9.4 K/UL — SIGNIFICANT CHANGE UP (ref 4.8–10.8)

## 2020-07-19 PROCEDURE — 71045 X-RAY EXAM CHEST 1 VIEW: CPT | Mod: 26

## 2020-07-19 PROCEDURE — 93010 ELECTROCARDIOGRAM REPORT: CPT

## 2020-07-19 RX ORDER — AMIODARONE HYDROCHLORIDE 400 MG/1
150 TABLET ORAL ONCE
Refills: 0 | Status: COMPLETED | OUTPATIENT
Start: 2020-07-19 | End: 2020-07-19

## 2020-07-19 RX ORDER — AMIODARONE HYDROCHLORIDE 400 MG/1
1 TABLET ORAL
Qty: 900 | Refills: 0 | Status: DISCONTINUED | OUTPATIENT
Start: 2020-07-19 | End: 2020-07-21

## 2020-07-19 RX ORDER — POTASSIUM CHLORIDE 20 MEQ
40 PACKET (EA) ORAL ONCE
Refills: 0 | Status: COMPLETED | OUTPATIENT
Start: 2020-07-19 | End: 2020-07-19

## 2020-07-19 RX ORDER — METOPROLOL TARTRATE 50 MG
50 TABLET ORAL EVERY 8 HOURS
Refills: 0 | Status: DISCONTINUED | OUTPATIENT
Start: 2020-07-19 | End: 2020-07-20

## 2020-07-19 RX ADMIN — Medication 300 MILLIGRAM(S): at 12:45

## 2020-07-19 RX ADMIN — SENNA PLUS 1 TABLET(S): 8.6 TABLET ORAL at 17:54

## 2020-07-19 RX ADMIN — Medication 3 MILLILITER(S): at 08:41

## 2020-07-19 RX ADMIN — Medication 600 MILLIGRAM(S): at 17:54

## 2020-07-19 RX ADMIN — ATORVASTATIN CALCIUM 40 MILLIGRAM(S): 80 TABLET, FILM COATED ORAL at 22:05

## 2020-07-19 RX ADMIN — Medication 50 MILLIGRAM(S): at 22:05

## 2020-07-19 RX ADMIN — MUPIROCIN 1 APPLICATION(S): 20 OINTMENT TOPICAL at 17:53

## 2020-07-19 RX ADMIN — Medication 325 MILLIGRAM(S): at 12:46

## 2020-07-19 RX ADMIN — MUPIROCIN 1 APPLICATION(S): 20 OINTMENT TOPICAL at 06:06

## 2020-07-19 RX ADMIN — Medication 400 MILLIGRAM(S): at 06:05

## 2020-07-19 RX ADMIN — Medication 400 MILLIGRAM(S): at 14:07

## 2020-07-19 RX ADMIN — CLOPIDOGREL BISULFATE 75 MILLIGRAM(S): 75 TABLET, FILM COATED ORAL at 12:46

## 2020-07-19 RX ADMIN — Medication 50 MILLIGRAM(S): at 06:05

## 2020-07-19 RX ADMIN — AMIODARONE HYDROCHLORIDE 600 MILLIGRAM(S): 400 TABLET ORAL at 23:00

## 2020-07-19 RX ADMIN — Medication 40 MILLIEQUIVALENT(S): at 06:46

## 2020-07-19 RX ADMIN — PANTOPRAZOLE SODIUM 40 MILLIGRAM(S): 20 TABLET, DELAYED RELEASE ORAL at 06:06

## 2020-07-19 RX ADMIN — Medication 3 MILLILITER(S): at 14:53

## 2020-07-19 RX ADMIN — Medication 600 MILLIGRAM(S): at 06:06

## 2020-07-19 RX ADMIN — Medication 3 MILLILITER(S): at 20:46

## 2020-07-19 RX ADMIN — Medication 400 MILLIGRAM(S): at 22:05

## 2020-07-19 RX ADMIN — ENOXAPARIN SODIUM 40 MILLIGRAM(S): 100 INJECTION SUBCUTANEOUS at 22:12

## 2020-07-19 RX ADMIN — Medication 50 MILLIGRAM(S): at 14:07

## 2020-07-19 NOTE — PROGRESS NOTE ADULT - SUBJECTIVE AND OBJECTIVE BOX
OPERATIVE PROCEDURE(s):      cabgx 5           POD # 4    SURGEON(s): rolan    SUBJECTIVE ASSESSMENT: pt is without complaints    Vital Signs Last 24 Hrs  T(C): 37.1 (19 Jul 2020 08:00), Max: 37.1 (19 Jul 2020 08:00)  T(F): 98.8 (19 Jul 2020 08:00), Max: 98.8 (19 Jul 2020 08:00)  HR: 100 (19 Jul 2020 10:13) (87 - 103)  BP: 122/90 (19 Jul 2020 10:13) (105/58 - 133/71)  BP(mean): 103 (19 Jul 2020 10:13) (74 - 103)  RR: 21 (19 Jul 2020 10:13) (19 - 28)  SpO2: 95% (19 Jul 2020 10:13) (92% - 97%)  07-18-20 @ 07:01  -  07-19-20 @ 07:00  --------------------------------------------------------  IN: 720 mL / OUT: 1550 mL / NET: -830 mL    07-19-20 @ 07:01  -  07-19-20 @ 14:09  --------------------------------------------------------  IN: 0 mL / OUT: 400 mL / NET: -400 mL    Physical Exam:  General: NAD; A&Ox3  Cardiac: S1/S2, RRR, no murmur, no rubs  Lungs: unlabored respirations, CTA b/l, no wheeze, no rales, no crackles  Abdomen: Soft/NT/ND; positive bowel sounds x 4  Sternum: Intact, no click, incision healing well with no drainage  Incisions: Incisions clean/dry/intact  Extremities: No edema b/l lower extremities; good capillary refill; no cyanosis; palpable 1+ pedal pulses b/l    Central Venous Catheter: Yes[ ]  No[x ] , If Yes indication:           Day #    Grigsby Catheter: Yes  [ ] , No [x ] : If yes indication:                         Day #    NGT: Yes [ ] No [x  ]     If Yes Placement:                                          Day #    Post-Op Beta-Blockers: Yes [x ], No[ ], If No, then contraindication:    CHEST TUBE:  [ ] YES [x ] NO  OUTPUT:     per 24 hours    AIR LEAKS:  [ ] YES [ ] NO      EPICARDIAL WIRES:  [x ] YES [ ] NO      BOWEL MOVEMENT:  [x ] YES [ ] NO          LABS:                        11.0   10.59 )-----------( 192      ( 19 Jul 2020 00:00 )             34.0     COUMADIN:   [ ] YES [ ] NO      07-19    143  |  104  |  27<H>  ----------------------------<  129<H>  3.6   |  25  |  0.9    Ca    8.6      19 Jul 2020 00:00  Mg     3.1     07-19          MEDICATIONS  (STANDING):  albuterol/ipratropium for Nebulization 3 milliLiter(s) Nebulizer every 6 hours  allopurinol 300 milliGRAM(s) Oral daily  aspirin enteric coated 325 milliGRAM(s) Oral daily  atorvastatin 40 milliGRAM(s) Oral at bedtime  clopidogrel Tablet 75 milliGRAM(s) Oral daily  dextrose 5%. 1000 milliLiter(s) (50 mL/Hr) IV Continuous <Continuous>  enoxaparin Injectable 40 milliGRAM(s) SubCutaneous at bedtime  guaiFENesin  milliGRAM(s) Oral every 12 hours  ibuprofen  Tablet. 400 milliGRAM(s) Oral every 8 hours  magnesium sulfate  IVPB 1 Gram(s) IV Intermittent every 12 hours  metoprolol tartrate 50 milliGRAM(s) Oral every 8 hours  mupirocin 2% Ointment 1 Application(s) Topical every 12 hours  pantoprazole    Tablet 40 milliGRAM(s) Oral before breakfast  polyethylene glycol 3350 17 Gram(s) Oral daily  senna 1 Tablet(s) Oral every 12 hours    MEDICATIONS  (PRN):  glucagon  Injectable 1 milliGRAM(s) IntraMuscular once PRN Glucose LESS THAN 70 milligrams/deciliter  oxyCODONE    IR 5 milliGRAM(s) Oral every 4 hours PRN Mild Pain (1 - 3)  oxyCODONE    IR 10 milliGRAM(s) Oral every 4 hours PRN Moderate Pain (4 - 6)      Allergies    adhesives (Rash)  Keflex (Diarrhea)  methylPREDNISolone (Pruritus)  rash (Rash)    Intolerances        Ambulation/Activity Status:        RADIOLOGY & ADDITIONAL TESTS:  Xray Chest 2 Views PA/Lat: AM   Indication: Shortness of Breath  Transport: Walking,  w/ Monitor (07-19-20 @ 09:46)  EXAM:  XR CHEST PORTABLE ROUTINE 1V            PROCEDURE DATE:  07/18/2020      INTERPRETATION:  Clinical History / Reason for exam: Status post cardiac surgery.    Comparison : Chest radiograph July 17, 2020.    Technique/Positioning: Adequate.    Findings:    Support devices: Right IJ line with its tip overlying the SVC.    Cardiac/mediastinum/hilum: The patient is status post a median sternotomy and cardiomegaly, unchanged.    Lung parenchyma/Pleura: Bibasilar opacities, unchanged. No pneumothorax is seen.    Skeleton/soft tissues: Stable    Impression:      Status post a median sternotomy and cardiomegaly, unchanged.    Bibasilar opacities, unchanged.    Right IJ line.      Assessment/Plan: Patient is a 70 yo male s/p cabg x 5 pod # 4  cont present tx as per ct surgeon  s/p cabg - cont asa, statin, and beta blocker  dvt/gi ppx  d/c colchicine due to multiple BM's  dec lopressor to 50mg po q8h  cont motrin for pericarditis  encourage is and ambulation  d/c cordis       Social Service Disposition:  home most likely tomorrow

## 2020-07-20 LAB
ALBUMIN SERPL ELPH-MCNC: 4.1 G/DL — SIGNIFICANT CHANGE UP (ref 3.5–5.2)
ALP SERPL-CCNC: 57 U/L — SIGNIFICANT CHANGE UP (ref 30–115)
ALT FLD-CCNC: 39 U/L — SIGNIFICANT CHANGE UP (ref 0–41)
ANION GAP SERPL CALC-SCNC: 16 MMOL/L — HIGH (ref 7–14)
AST SERPL-CCNC: 41 U/L — SIGNIFICANT CHANGE UP (ref 0–41)
BILIRUB SERPL-MCNC: 1.1 MG/DL — SIGNIFICANT CHANGE UP (ref 0.2–1.2)
BUN SERPL-MCNC: 25 MG/DL — HIGH (ref 10–20)
CALCIUM SERPL-MCNC: 9.3 MG/DL — SIGNIFICANT CHANGE UP (ref 8.5–10.1)
CHLORIDE SERPL-SCNC: 107 MMOL/L — SIGNIFICANT CHANGE UP (ref 98–110)
CO2 SERPL-SCNC: 21 MMOL/L — SIGNIFICANT CHANGE UP (ref 17–32)
CREAT SERPL-MCNC: 1 MG/DL — SIGNIFICANT CHANGE UP (ref 0.7–1.5)
GLUCOSE BLDC GLUCOMTR-MCNC: 90 MG/DL — SIGNIFICANT CHANGE UP (ref 70–99)
GLUCOSE SERPL-MCNC: 98 MG/DL — SIGNIFICANT CHANGE UP (ref 70–99)
MAGNESIUM SERPL-MCNC: 1.8 MG/DL — SIGNIFICANT CHANGE UP (ref 1.8–2.4)
POTASSIUM SERPL-MCNC: 4.4 MMOL/L — SIGNIFICANT CHANGE UP (ref 3.5–5)
POTASSIUM SERPL-SCNC: 4.4 MMOL/L — SIGNIFICANT CHANGE UP (ref 3.5–5)
PROT SERPL-MCNC: 6.7 G/DL — SIGNIFICANT CHANGE UP (ref 6–8)
SODIUM SERPL-SCNC: 144 MMOL/L — SIGNIFICANT CHANGE UP (ref 135–146)

## 2020-07-20 PROCEDURE — 93010 ELECTROCARDIOGRAM REPORT: CPT

## 2020-07-20 PROCEDURE — 71046 X-RAY EXAM CHEST 2 VIEWS: CPT | Mod: 26

## 2020-07-20 PROCEDURE — 99233 SBSQ HOSP IP/OBS HIGH 50: CPT

## 2020-07-20 RX ORDER — AMIODARONE HYDROCHLORIDE 400 MG/1
400 TABLET ORAL EVERY 8 HOURS
Refills: 0 | Status: DISCONTINUED | OUTPATIENT
Start: 2020-07-20 | End: 2020-07-21

## 2020-07-20 RX ORDER — METOPROLOL TARTRATE 50 MG
100 TABLET ORAL EVERY 12 HOURS
Refills: 0 | Status: DISCONTINUED | OUTPATIENT
Start: 2020-07-20 | End: 2020-07-21

## 2020-07-20 RX ORDER — HYDRALAZINE HCL 50 MG
10 TABLET ORAL ONCE
Refills: 0 | Status: COMPLETED | OUTPATIENT
Start: 2020-07-20 | End: 2020-07-20

## 2020-07-20 RX ORDER — METOPROLOL TARTRATE 50 MG
2.5 TABLET ORAL ONCE
Refills: 0 | Status: COMPLETED | OUTPATIENT
Start: 2020-07-20 | End: 2020-07-20

## 2020-07-20 RX ORDER — METOPROLOL TARTRATE 50 MG
50 TABLET ORAL ONCE
Refills: 0 | Status: COMPLETED | OUTPATIENT
Start: 2020-07-20 | End: 2020-07-20

## 2020-07-20 RX ORDER — AMIODARONE HYDROCHLORIDE 400 MG/1
200 TABLET ORAL DAILY
Refills: 0 | Status: DISCONTINUED | OUTPATIENT
Start: 2020-07-22 | End: 2020-07-21

## 2020-07-20 RX ORDER — AMIODARONE HYDROCHLORIDE 400 MG/1
TABLET ORAL
Refills: 0 | Status: DISCONTINUED | OUTPATIENT
Start: 2020-07-20 | End: 2020-07-21

## 2020-07-20 RX ADMIN — Medication 50 MILLIGRAM(S): at 08:49

## 2020-07-20 RX ADMIN — Medication 100 GRAM(S): at 20:01

## 2020-07-20 RX ADMIN — Medication 325 MILLIGRAM(S): at 11:30

## 2020-07-20 RX ADMIN — Medication 600 MILLIGRAM(S): at 18:13

## 2020-07-20 RX ADMIN — AMIODARONE HYDROCHLORIDE 400 MILLIGRAM(S): 400 TABLET ORAL at 14:30

## 2020-07-20 RX ADMIN — MUPIROCIN 1 APPLICATION(S): 20 OINTMENT TOPICAL at 18:12

## 2020-07-20 RX ADMIN — Medication 600 MILLIGRAM(S): at 05:30

## 2020-07-20 RX ADMIN — ENOXAPARIN SODIUM 40 MILLIGRAM(S): 100 INJECTION SUBCUTANEOUS at 23:37

## 2020-07-20 RX ADMIN — Medication 2.5 MILLIGRAM(S): at 02:15

## 2020-07-20 RX ADMIN — Medication 50 MILLIGRAM(S): at 05:30

## 2020-07-20 RX ADMIN — Medication 2.5 MILLIGRAM(S): at 04:20

## 2020-07-20 RX ADMIN — MUPIROCIN 1 APPLICATION(S): 20 OINTMENT TOPICAL at 05:30

## 2020-07-20 RX ADMIN — AMIODARONE HYDROCHLORIDE 400 MILLIGRAM(S): 400 TABLET ORAL at 22:03

## 2020-07-20 RX ADMIN — Medication 300 MILLIGRAM(S): at 11:30

## 2020-07-20 RX ADMIN — Medication 10 MILLIGRAM(S): at 06:10

## 2020-07-20 RX ADMIN — CLOPIDOGREL BISULFATE 75 MILLIGRAM(S): 75 TABLET, FILM COATED ORAL at 11:30

## 2020-07-20 RX ADMIN — Medication 400 MILLIGRAM(S): at 05:30

## 2020-07-20 RX ADMIN — PANTOPRAZOLE SODIUM 40 MILLIGRAM(S): 20 TABLET, DELAYED RELEASE ORAL at 07:34

## 2020-07-20 RX ADMIN — Medication 100 GRAM(S): at 05:31

## 2020-07-20 RX ADMIN — ATORVASTATIN CALCIUM 40 MILLIGRAM(S): 80 TABLET, FILM COATED ORAL at 22:03

## 2020-07-20 RX ADMIN — Medication 100 MILLIGRAM(S): at 18:13

## 2020-07-20 NOTE — DIETITIAN INITIAL EVALUATION ADULT. - DIET TYPE
Pt. reports good appetite, regular meals and snacks PTP, regular diet, mindful of salt intake. Takes MVI, vit D, flax and fish oil supplements. NKFA. Stable weight trends. No cultural/Jewish dietary restr.

## 2020-07-20 NOTE — PROGRESS NOTE ADULT - ASSESSMENT
PROBLEMS:  I spent 45 minutes of time examining patient, reviewing vitals, labs, medications, imaging and discussing with the team goals of care to prevent life-threatening in this patient who is at high risk for deterioration or death due to:    1.	CAD s/p CABD x 5 - Lopressor increase to 100 q 12, , Plavix 75, Lipitor 40  2.	A.Fib - change amiodarone to 400 q 8   3.	ST normal - stop ibuprofen  4.	d/c intro  5.	HTN - increased lopressor    PLAN  Neuro: move all 4 extremities. no sensory or motor deficits  Pain management.   oxyCODONE    IR 5 milliGRAM(s) Oral every 4 hours PRN  oxyCODONE    IR 10 milliGRAM(s) Oral every 4 hours PRN    Pulm: Wean off supplemental oxygen as able. Daily CXR. Encourage coughing, deep breathing and use of incentive spirometry.   guaiFENesin  milliGRAM(s) Oral every 12 hours    Cardio: Monitor telemetry/alarms. Continue supportive care   aMIOdarone    Tablet   Oral   aMIOdarone    Tablet 400 milliGRAM(s) Oral every 8 hours  aMIOdarone Infusion 1 mG/Min IV Continuous <Continuous>  metoprolol tartrate 100 milliGRAM(s) Oral every 12 hours    GI: Continue stool softeners.    pantoprazole    Tablet 40 milliGRAM(s) Oral before breakfast  senna 1 Tablet(s) Oral every 12 hours    Nutrition: Continue present diet  Endocrine and glucose control:   allopurinol 300 milliGRAM(s) Oral daily  atorvastatin 40 milliGRAM(s) Oral at bedtime  glucagon  Injectable 1 milliGRAM(s) IntraMuscular once PRN    Renal: monitor urine output, supplement electrolytes as needed,     Vasc: Heparin SC and/or SCDs for DVT prophylaxis  aspirin enteric coated 325 milliGRAM(s) Oral daily  clopidogrel Tablet 75 milliGRAM(s) Oral daily  enoxaparin Injectable 40 milliGRAM(s) SubCutaneous at bedtime    ID: Stable, no fever , no chills. Off antibiotics.    Therapy: OOB/ambulate  Disposition: start planing discharge home or placement    Pertinent clinical, laboratory, radiographic, hemodynamic, echocardiographic, respiratory data, microbiologic data and chart were reviewed and analyzed frequently throughout the course of the day and night. GI and DVT prophylaxis, glycemic control, head of bed elevation and skin care issues were addressed.  Patient seen, examined and plan discussed with CT Surgery / CTICU team during rounds.    [ ] The patient remains in critical and unstable condition, and requires ICU care and monitoring  [x ] The patient is improving but requires continued monitoring and adjustment of therapy

## 2020-07-20 NOTE — PROGRESS NOTE ADULT - SUBJECTIVE AND OBJECTIVE BOX
OPERATIVE PROCEDURE(s):      cabg x 5          POD #    SURGEON(s): rolan      SUBJECTIVE ASSESSMENT: pt is without complaints    Vital Signs Last 24 Hrs  T(C): 37.2 (20 Jul 2020 12:30), Max: 37.2 (20 Jul 2020 12:30)  T(F): 98.9 (20 Jul 2020 12:30), Max: 98.9 (20 Jul 2020 12:30)  HR: 93 (20 Jul 2020 12:57) (83 - 133)  BP: 162/72 (20 Jul 2020 12:30) (130/67 - 184/80)  BP(mean): 104 (20 Jul 2020 12:30) (88 - 115)  RR: 39 (20 Jul 2020 12:57) (18 - 39)  SpO2: 96% (20 Jul 2020 12:57) (92% - 99%)  07-19-20 @ 07:01  -  07-20-20 @ 07:00  --------------------------------------------------------  IN: 753.1 mL / OUT: 1050 mL / NET: -296.9 mL    07-20-20 @ 07:01  -  07-20-20 @ 15:33  --------------------------------------------------------  IN: 99.9 mL / OUT: 400 mL / NET: -300.1 mL      Physical Exam:  General: NAD; A&Ox3  Cardiac: S1/S2, RRR, no murmur, no rubs  Lungs: unlabored respirations, CTA b/l, no wheeze, no rales, no crackles  Abdomen: Soft/NT/ND; positive bowel sounds x 4  Sternum: Intact, no click, incision healing well with no drainage  Incisions: Incisions clean/dry/intact  Extremities: No edema b/l lower extremities; good capillary refill; no cyanosis; palpable 1+ pedal pulses b/l    Central Venous Catheter: Yes[ ]  No[ x] , If Yes indication:           Day #    Grigsby Catheter: Yes  [ ] , No [x ] : If yes indication:                         Day #    NGT: Yes [ ] No [ x ]     If Yes Placement:                                          Day #    Post-Op Beta-Blockers: Yes [x ], No[ ], If No, then contraindication:    CHEST TUBE:  [ ] YES [ x] NO  OUTPUT:     per 24 hours    AIR LEAKS:  [ ] YES [ ] NO      EPICARDIAL WIRES:  [ ] YES [x ] NO      BOWEL MOVEMENT:  [ ] YES [x] NO          LABS:                        11.6   9.40  )-----------( 226      ( 19 Jul 2020 23:17 )             35.7     COUMADIN:   [ ] YES [ x] NO      07-19    144  |  107  |  25<H>  ----------------------------<  98  4.4   |  21  |  1.0    Ca    9.3      19 Jul 2020 23:17  Mg     1.8     07-19    TPro  6.7  /  Alb  4.1  /  TBili  1.1  /  DBili  x   /  AST  41  /  ALT  39  /  AlkPhos  57  07-19        MEDICATIONS  (STANDING):  allopurinol 300 milliGRAM(s) Oral daily  aMIOdarone    Tablet   Oral   aMIOdarone    Tablet 400 milliGRAM(s) Oral every 8 hours  aMIOdarone Infusion 1 mG/Min (33.3 mL/Hr) IV Continuous <Continuous>  aspirin enteric coated 325 milliGRAM(s) Oral daily  atorvastatin 40 milliGRAM(s) Oral at bedtime  clopidogrel Tablet 75 milliGRAM(s) Oral daily  dextrose 5%. 1000 milliLiter(s) (50 mL/Hr) IV Continuous <Continuous>  enoxaparin Injectable 40 milliGRAM(s) SubCutaneous at bedtime  guaiFENesin  milliGRAM(s) Oral every 12 hours  magnesium sulfate  IVPB 1 Gram(s) IV Intermittent every 12 hours  metoprolol tartrate 100 milliGRAM(s) Oral every 12 hours  mupirocin 2% Ointment 1 Application(s) Topical every 12 hours  pantoprazole    Tablet 40 milliGRAM(s) Oral before breakfast  polyethylene glycol 3350 17 Gram(s) Oral daily  senna 1 Tablet(s) Oral every 12 hours    MEDICATIONS  (PRN):  glucagon  Injectable 1 milliGRAM(s) IntraMuscular once PRN Glucose LESS THAN 70 milligrams/deciliter  oxyCODONE    IR 5 milliGRAM(s) Oral every 4 hours PRN Mild Pain (1 - 3)  oxyCODONE    IR 10 milliGRAM(s) Oral every 4 hours PRN Moderate Pain (4 - 6)      Allergies    adhesives (Rash)  Keflex (Diarrhea)  methylPREDNISolone (Pruritus)  rash (Rash)    Intolerances        Ambulation/Activity Status:    amb well with pt    RADIOLOGY & ADDITIONAL TESTS:  EXAM:  XR CHEST PA LAT 2V            PROCEDURE DATE:  07/20/2020            INTERPRETATION:  Clinical History / Reason for exam: Shortness of breath.    Comparison : Chest radiograph 7/19/2020.    Technique/Positioning: Frontal and lateral radiographs of the chest.    Findings:    Cardiac/mediastinum/hilum: No significant change    Lung parenchyma/Pleura: Left basilar opacity/effusion. Right basilar linear opacity/atelectasis.. No evidence of pneumothorax.    Skeleton/soft tissues: Unchanged.    Impression:      Left basilar opacity/effusion.    Right basilar linear opacity/atelectasis..      Assessment/Plan: Patient is a 70 yo male s/p cabg x 5 pod # 5  cont present tx as per ct surgeon  s/p cabg - cont asa, statin, and beta blocker  dvt/gi ppx  change lopressor to 100mg po q12h  d/c motrin  encourage is and ambulation  post op a. fib- pt converted to sr with amio- cont amio iv and load      Social Service Disposition:  home poss tomorrow OPERATIVE PROCEDURE(s):      cabg x 5          POD #    SURGEON(s): rolan      SUBJECTIVE ASSESSMENT: pt is without complaints.  pt went into a. fib overnight and converted with amio    Vital Signs Last 24 Hrs  T(C): 37.2 (20 Jul 2020 12:30), Max: 37.2 (20 Jul 2020 12:30)  T(F): 98.9 (20 Jul 2020 12:30), Max: 98.9 (20 Jul 2020 12:30)  HR: 93 (20 Jul 2020 12:57) (83 - 133)  BP: 162/72 (20 Jul 2020 12:30) (130/67 - 184/80)  BP(mean): 104 (20 Jul 2020 12:30) (88 - 115)  RR: 39 (20 Jul 2020 12:57) (18 - 39)  SpO2: 96% (20 Jul 2020 12:57) (92% - 99%)  07-19-20 @ 07:01  -  07-20-20 @ 07:00  --------------------------------------------------------  IN: 753.1 mL / OUT: 1050 mL / NET: -296.9 mL    07-20-20 @ 07:01  -  07-20-20 @ 15:33  --------------------------------------------------------  IN: 99.9 mL / OUT: 400 mL / NET: -300.1 mL      Physical Exam:  General: NAD; A&Ox3  Cardiac: S1/S2, RRR, no murmur, no rubs  Lungs: unlabored respirations, CTA b/l, no wheeze, no rales, no crackles  Abdomen: Soft/NT/ND; positive bowel sounds x 4  Sternum: Intact, no click, incision healing well with no drainage  Incisions: Incisions clean/dry/intact  Extremities: No edema b/l lower extremities; good capillary refill; no cyanosis; palpable 1+ pedal pulses b/l    Central Venous Catheter: Yes[ ]  No[ x] , If Yes indication:           Day #    Grigsby Catheter: Yes  [ ] , No [x ] : If yes indication:                         Day #    NGT: Yes [ ] No [ x ]     If Yes Placement:                                          Day #    Post-Op Beta-Blockers: Yes [x ], No[ ], If No, then contraindication:    CHEST TUBE:  [ ] YES [ x] NO  OUTPUT:     per 24 hours    AIR LEAKS:  [ ] YES [ ] NO      EPICARDIAL WIRES:  [ ] YES [x ] NO      BOWEL MOVEMENT:  [ ] YES [x] NO          LABS:                        11.6   9.40  )-----------( 226      ( 19 Jul 2020 23:17 )             35.7     COUMADIN:   [ ] YES [ x] NO      07-19    144  |  107  |  25<H>  ----------------------------<  98  4.4   |  21  |  1.0    Ca    9.3      19 Jul 2020 23:17  Mg     1.8     07-19    TPro  6.7  /  Alb  4.1  /  TBili  1.1  /  DBili  x   /  AST  41  /  ALT  39  /  AlkPhos  57  07-19        MEDICATIONS  (STANDING):  allopurinol 300 milliGRAM(s) Oral daily  aMIOdarone    Tablet   Oral   aMIOdarone    Tablet 400 milliGRAM(s) Oral every 8 hours  aMIOdarone Infusion 1 mG/Min (33.3 mL/Hr) IV Continuous <Continuous>  aspirin enteric coated 325 milliGRAM(s) Oral daily  atorvastatin 40 milliGRAM(s) Oral at bedtime  clopidogrel Tablet 75 milliGRAM(s) Oral daily  dextrose 5%. 1000 milliLiter(s) (50 mL/Hr) IV Continuous <Continuous>  enoxaparin Injectable 40 milliGRAM(s) SubCutaneous at bedtime  guaiFENesin  milliGRAM(s) Oral every 12 hours  magnesium sulfate  IVPB 1 Gram(s) IV Intermittent every 12 hours  metoprolol tartrate 100 milliGRAM(s) Oral every 12 hours  mupirocin 2% Ointment 1 Application(s) Topical every 12 hours  pantoprazole    Tablet 40 milliGRAM(s) Oral before breakfast  polyethylene glycol 3350 17 Gram(s) Oral daily  senna 1 Tablet(s) Oral every 12 hours    MEDICATIONS  (PRN):  glucagon  Injectable 1 milliGRAM(s) IntraMuscular once PRN Glucose LESS THAN 70 milligrams/deciliter  oxyCODONE    IR 5 milliGRAM(s) Oral every 4 hours PRN Mild Pain (1 - 3)  oxyCODONE    IR 10 milliGRAM(s) Oral every 4 hours PRN Moderate Pain (4 - 6)      Allergies    adhesives (Rash)  Keflex (Diarrhea)  methylPREDNISolone (Pruritus)  rash (Rash)    Intolerances        Ambulation/Activity Status:    amb well with pt    RADIOLOGY & ADDITIONAL TESTS:  EXAM:  XR CHEST PA LAT 2V            PROCEDURE DATE:  07/20/2020            INTERPRETATION:  Clinical History / Reason for exam: Shortness of breath.    Comparison : Chest radiograph 7/19/2020.    Technique/Positioning: Frontal and lateral radiographs of the chest.    Findings:    Cardiac/mediastinum/hilum: No significant change    Lung parenchyma/Pleura: Left basilar opacity/effusion. Right basilar linear opacity/atelectasis.. No evidence of pneumothorax.    Skeleton/soft tissues: Unchanged.    Impression:      Left basilar opacity/effusion.    Right basilar linear opacity/atelectasis..      Assessment/Plan: Patient is a 68 yo male s/p cabg x 5 pod # 5  cont present tx as per ct surgeon  s/p cabg - cont asa, statin, and beta blocker  dvt/gi ppx  change lopressor to 100mg po q12h  d/c motrin  encourage is and ambulation  post op a. fib- pt converted to sr with amio- cont amio iv and load      Social Service Disposition:  home poss tomorrow

## 2020-07-20 NOTE — DIETITIAN INITIAL EVALUATION ADULT. - ENERGY NEEDS
calorie 1750-2100kcal (25-30kcal/kg IBW) for obesity  protein 70-84g (1-1.2g/kg IBW) as above  fluid per CTU team

## 2020-07-20 NOTE — DIETITIAN INITIAL EVALUATION ADULT. - RD TO REMAIN AVAILABLE
RD to monitor diet order, energy intake, body composition, NFPF. Goal: In 7 days pt. to consistently consume % PO at meals./yes

## 2020-07-20 NOTE — PROGRESS NOTE ADULT - SUBJECTIVE AND OBJECTIVE BOX
CTU Attending Progress Daily Note     20 Jul 2020 10:46  Admited 07-13-20, Hospital Day 7d  POD# - 5    HPI: CAD  [69y man]    CC: chest pain    PMH: hypertension, chronic back pain, BPH, GERD, gout, and renal cell carcinoma s/p partial left nephrectomy (9/17/2009)    PSH: Bladder polyp S/p removal (4/4/2019 by Dr. Wylie), TURP (7/27/2005 by Dr. Mendez), laminectomy of L3/L4 (9/11/2002 by Dr. Jara), partial left nephrectomy (9/17/2009 by Dr. Vergara), spinal fusion (L4/5-L5/S1 6/23/2008 by Dr. Jara) with revision of fusion Transome Axial JF (3/1/2020 by Dr. 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 The patient was told the results were abnormal and that he had "a possible blockage." He was scheduled for elective cardiac catheterization with Dr. Fernandes on 7/23/2020. On the day of presentation, the patient had finished eat breakfast ("a bowl of Cheerios for the heart") and was sitting down talking to his wife at home when he began developing back pain behind his left shoulder. He said that the pain was different from the chronic back pain that he usually feels. The pain was 6/10 intensity that did not radiate and was accompanied with chest tightness and nausea. He decided to come to ED, worried that something was wrong. By the time the patient arrived at the hospital, his chest pain had diminished to a 3-4/10 intensity and the nausea had resolved.    In the ED, vital signs were Tmax 98.7F, , /91, RR 16, SpO2 99% on room air. Patient was given aspirin 162mg PO x1 dose. On sign out, ED mentioned possible Q waves on ECG but I did not appreciated them. ECG noted for left anterior fascicular block. Troponin negative x1. Per ED, patient is scheduled in AM for cardiac catheterization with Dr. Fernandes. When I spoke to the patient, the pain had mostly resolved and was now a 1/10. (13 Jul 2020 18:43)    Home Medications:  allopurinol 300 mg oral tablet: 1 tab(s) orally once a day (13 Jul 2020 18:35)  aspirin 81 mg oral tablet: 1 tab(s) orally Tuesday, Thursday, Saturday, Sunday (13 Jul 2020 18:35)  Colace 100 mg oral capsule: 1 cap(s) orally Monday, Wednesday, and Friday (13 Jul 2020 18:35)  Diovan  mg-12.5 mg oral tablet: 1 tab(s) orally once a day (13 Jul 2020 18:35)  Fish Oil 1200 mg oral capsule: 1 cap(s) orally 2 times a day (13 Jul 2020 18:35)  Flax Seed Oil oral capsule: 1200 milligram(s) orally 2 times a day (13 Jul 2020 18:35)  Multiple Vitamins oral tablet: 1 tab(s) orally once a day (13 Jul 2020 18:35)  PriLOSEC OTC 20 mg oral delayed release tablet: 1 tab(s) orally Tuesday, Thursday, Saturday, Sunday (13 Jul 2020 18:35)    FAMILY HISTORY:    PAST MEDICAL & SURGICAL HISTORY:  BPH (benign prostatic hyperplasia)  GERD (gastroesophageal reflux disease)  Back pain  HTN (hypertension)  Gout  Renal cancer, left: Renal cell carcinoma s/p partial left nephrectomy (20% removed)  Bladder polyp: S/p removal 4/4/2019 by Dr. Wylie  H/O spinal fusion: L4/5-L5/S1 6/23/2008 by Dr. Jara with revision of fusion Transome Axial JF 3/1/2020 by Dr. Jara  H/O laminectomy: L3/L4 9/11/2002 by Dr. Marek Clay tumor: Removed 2/3/2006 by Dr. Prieto  H/O cystoscopy: TURP 7/27/2005 by Dr. Mendez  H/O partial nephrectomy: 9/17/2009 by Dr. Vergara    Interval event for past 24 hr:  EUSEBIO MORENO  69y had no event.   Current Complains:  SHYAMEUSEBIO KUO has no new complains  Allergies    adhesives (Rash)  Keflex (Diarrhea)  methylPREDNISolone (Pruritus)  rash (Rash)    Intolerances      OBJECTIVE:  Vitals last 24 hrs  T(C): 36.4 (07-20-20 @ 07:09), Max: 36.8 (07-19-20 @ 20:00)  T(F): 97.6 (07-20-20 @ 07:09), Max: 98.3 (07-19-20 @ 20:00)  HR: 94 (07-20-20 @ 08:10) (83 - 133)  BP: 146/67 (07-20-20 @ 08:10) (114/57 - 184/80)  ABP: --  ABP(mean): --  RR: 28 (07-20-20 @ 08:00) (18 - 41)  SpO2: 95% (07-20-20 @ 08:10) (92% - 99%)      07-19-20 @ 07:01  -  07-20-20 @ 07:00  --------------------------------------------------------  IN:    amiodarone Infusion: 233.1 mL    IV PiggyBack: 200 mL    Oral Fluid: 320 mL  Total IN: 753.1 mL    OUT:    Voided: 1050 mL  Total OUT: 1050 mL    Total NET: -296.9 mL          CAPILLARY BLOOD GLUCOSE        LABS:                          11.6   9.40  )-----------( 226      ( 19 Jul 2020 23:17 )             35.7     Hemoglobin: 11.6 g/dL (07-19 @ 23:17)  Hemoglobin: 11.0 g/dL (07-19 @ 00:00)  Hemoglobin: 11.1 g/dL (07-18 @ 02:47)    07-19    144  |  107  |  25<H>  ----------------------------<  98  4.4   |  21  |  1.0    Ca    9.3      19 Jul 2020 23:17  Mg     1.8     07-19    TPro  6.7  /  Alb  4.1  /  TBili  1.1  /  DBili  x   /  AST  41  /  ALT  39  /  AlkPhos  57  07-19    Creatinine, Serum: 1.0 mg/dL (07-19 @ 23:17)  Creatinine, Serum: 0.9 mg/dL (07-19 @ 00:00)  Creatinine, Serum: 0.9 mg/dL (07-18 @ 02:47)  Creatinine, Serum: 0.8 mg/dL (07-17 @ 00:50)          HOSPITAL MEDICATIONS:  MEDICATIONS  (STANDING):  allopurinol 300 milliGRAM(s) Oral daily  aMIOdarone    Tablet   Oral   aMIOdarone    Tablet 400 milliGRAM(s) Oral every 8 hours  aMIOdarone Infusion 1 mG/Min (33.3 mL/Hr) IV Continuous <Continuous>  aspirin enteric coated 325 milliGRAM(s) Oral daily  atorvastatin 40 milliGRAM(s) Oral at bedtime  clopidogrel Tablet 75 milliGRAM(s) Oral daily  dextrose 5%. 1000 milliLiter(s) (50 mL/Hr) IV Continuous <Continuous>  enoxaparin Injectable 40 milliGRAM(s) SubCutaneous at bedtime  guaiFENesin  milliGRAM(s) Oral every 12 hours  magnesium sulfate  IVPB 1 Gram(s) IV Intermittent every 12 hours  metoprolol tartrate 100 milliGRAM(s) Oral every 12 hours  mupirocin 2% Ointment 1 Application(s) Topical every 12 hours  pantoprazole    Tablet 40 milliGRAM(s) Oral before breakfast  polyethylene glycol 3350 17 Gram(s) Oral daily  senna 1 Tablet(s) Oral every 12 hours    MEDICATIONS  (PRN):  glucagon  Injectable 1 milliGRAM(s) IntraMuscular once PRN Glucose LESS THAN 70 milligrams/deciliter  oxyCODONE    IR 5 milliGRAM(s) Oral every 4 hours PRN Mild Pain (1 - 3)  oxyCODONE    IR 10 milliGRAM(s) Oral every 4 hours PRN Moderate Pain (4 - 6)      REVIEW OF SYSTEMS:  CONSTITUTIONAL: [X] all negative; [ ] weakness, [ ] fevers, [ ] chills  EYES/ENT: [X] all negative; [ ] visual changes, [ ] vertigo, [ ] throat pain, [ ] eye pain  NECK: [X] all negative; [ ] pain, [ ] stiffness  RESPIRATORY: [ ] all negative, [x ] cough, [ ] wheezing, [ ] hemoptysis, [ ] shortness of breath, [ x ] chest pain  CARDIOVASCULAR: [ ] all negative; [ ] anginal chest pain, [ ] palpitations, [ ] orthopnea  GASTROINTESTINAL: [X] all negative; [ ]abdominal pain, [ ] nausea, [ ] vomiting, [ ] hematemesis, [ ] diarrhea, [ ] constipation, [ ] melena, [ ] hematochezia.  GENITOURINARY: [X] all negative; [ ] dysuria, [ ] frequency, [ ] hematuria  NEUROLOGICAL: [X] all negative; [ ] numbness, [ ] weakness, [ ] paresthesias  MUSCULOSKELETAL: [X] all negative, [ ] joint pain, [ ] joint swelling, [ ] joint redness, [ ] bone pain  SKIN: [X] all negative; [ ] itching, [ ] burning, [ ] rashes, [ ] lesions   All other review of systems is negative unless indicated above.    [  ] Unable to assess ROS because     PHYSICAL EXAM:          CONSTITUTIONAL: Well-developed; well-nourished; in no acute distress.   	SKIN: warm, dry, no rashes or lesions  	HEENT: Atraumatic. Normocephalic. PERRL. Moist membranes, no conjunctival injection, sclera clear  	NECK: Supple; non tender.  No JVD. No lymphadenopathy.  	CARD: Normal S1, S2. Rate and Rhythm are regular. No murmurs.  	RESP: Good air entry bilaterally, no wheezes, no rales no rhonchi.  	ABD: Soft, not tender, not distended, no CVA tendernass, no rebound no guarding, bowel sounds present  	EXT: Normal ROM.  No clubbing, no cyanosis, no pedal edema, no calf pain b/l, Peripheral pulses intact.  	LYMPH: No acute cervical adenopathy.  	NEURO: Alert, awake, motor 5/5 R, 5/5 L, sensation intact bilat, CN 2-12 intact,          PSYCH: Cooperative, appropriate. Alert & oriented x 3    RADIOLOGY:  X Reviewed and interpreted by me  CxR from 07-20-20 shows mild congestion, no pneumothorax, no effusion, no cardiomegaly,     ECG:  X Reviewed and interpreted by me - NSR 0 93, QTc - 484, no ST elevation

## 2020-07-21 VITALS — HEART RATE: 91 BPM

## 2020-07-21 PROBLEM — C64.2 MALIGNANT NEOPLASM OF LEFT KIDNEY, EXCEPT RENAL PELVIS: Chronic | Status: ACTIVE | Noted: 2019-02-19

## 2020-07-21 LAB
ANION GAP SERPL CALC-SCNC: 18 MMOL/L — HIGH (ref 7–14)
BASOPHILS # BLD AUTO: 0.04 K/UL — SIGNIFICANT CHANGE UP (ref 0–0.2)
BASOPHILS NFR BLD AUTO: 0.3 % — SIGNIFICANT CHANGE UP (ref 0–1)
BUN SERPL-MCNC: 17 MG/DL — SIGNIFICANT CHANGE UP (ref 10–20)
CALCIUM SERPL-MCNC: 9.3 MG/DL — SIGNIFICANT CHANGE UP (ref 8.5–10.1)
CHLORIDE SERPL-SCNC: 104 MMOL/L — SIGNIFICANT CHANGE UP (ref 98–110)
CO2 SERPL-SCNC: 19 MMOL/L — SIGNIFICANT CHANGE UP (ref 17–32)
CREAT SERPL-MCNC: 0.8 MG/DL — SIGNIFICANT CHANGE UP (ref 0.7–1.5)
EOSINOPHIL # BLD AUTO: 0.35 K/UL — SIGNIFICANT CHANGE UP (ref 0–0.7)
EOSINOPHIL NFR BLD AUTO: 3 % — SIGNIFICANT CHANGE UP (ref 0–8)
GLUCOSE SERPL-MCNC: 118 MG/DL — HIGH (ref 70–99)
HCT VFR BLD CALC: 37.2 % — LOW (ref 42–52)
HGB BLD-MCNC: 12.6 G/DL — LOW (ref 14–18)
IMM GRANULOCYTES NFR BLD AUTO: 0.4 % — HIGH (ref 0.1–0.3)
LYMPHOCYTES # BLD AUTO: 1.21 K/UL — SIGNIFICANT CHANGE UP (ref 1.2–3.4)
LYMPHOCYTES # BLD AUTO: 10.4 % — LOW (ref 20.5–51.1)
MAGNESIUM SERPL-MCNC: 1.8 MG/DL — SIGNIFICANT CHANGE UP (ref 1.8–2.4)
MCHC RBC-ENTMCNC: 32.1 PG — HIGH (ref 27–31)
MCHC RBC-ENTMCNC: 33.9 G/DL — SIGNIFICANT CHANGE UP (ref 32–37)
MCV RBC AUTO: 94.7 FL — HIGH (ref 80–94)
MONOCYTES # BLD AUTO: 1.01 K/UL — HIGH (ref 0.1–0.6)
MONOCYTES NFR BLD AUTO: 8.6 % — SIGNIFICANT CHANGE UP (ref 1.7–9.3)
NEUTROPHILS # BLD AUTO: 9.03 K/UL — HIGH (ref 1.4–6.5)
NEUTROPHILS NFR BLD AUTO: 77.3 % — HIGH (ref 42.2–75.2)
NRBC # BLD: 0 /100 WBCS — SIGNIFICANT CHANGE UP (ref 0–0)
PLATELET # BLD AUTO: 263 K/UL — SIGNIFICANT CHANGE UP (ref 130–400)
POTASSIUM SERPL-MCNC: 3.7 MMOL/L — SIGNIFICANT CHANGE UP (ref 3.5–5)
POTASSIUM SERPL-SCNC: 3.7 MMOL/L — SIGNIFICANT CHANGE UP (ref 3.5–5)
RBC # BLD: 3.93 M/UL — LOW (ref 4.7–6.1)
RBC # FLD: 14.2 % — SIGNIFICANT CHANGE UP (ref 11.5–14.5)
SODIUM SERPL-SCNC: 141 MMOL/L — SIGNIFICANT CHANGE UP (ref 135–146)
WBC # BLD: 11.69 K/UL — HIGH (ref 4.8–10.8)
WBC # FLD AUTO: 11.69 K/UL — HIGH (ref 4.8–10.8)

## 2020-07-21 PROCEDURE — 93010 ELECTROCARDIOGRAM REPORT: CPT

## 2020-07-21 PROCEDURE — 99233 SBSQ HOSP IP/OBS HIGH 50: CPT

## 2020-07-21 RX ORDER — ATORVASTATIN CALCIUM 80 MG/1
1 TABLET, FILM COATED ORAL
Qty: 30 | Refills: 0
Start: 2020-07-21 | End: 2020-08-19

## 2020-07-21 RX ORDER — MAGNESIUM OXIDE 400 MG ORAL TABLET 241.3 MG
400 TABLET ORAL ONCE
Refills: 0 | Status: COMPLETED | OUTPATIENT
Start: 2020-07-21 | End: 2020-07-21

## 2020-07-21 RX ORDER — CLOPIDOGREL BISULFATE 75 MG/1
1 TABLET, FILM COATED ORAL
Qty: 90 | Refills: 0
Start: 2020-07-21

## 2020-07-21 RX ORDER — ASPIRIN/CALCIUM CARB/MAGNESIUM 324 MG
1 TABLET ORAL
Qty: 0 | Refills: 0 | DISCHARGE

## 2020-07-21 RX ORDER — METOPROLOL TARTRATE 50 MG
1 TABLET ORAL
Qty: 60 | Refills: 0
Start: 2020-07-21

## 2020-07-21 RX ORDER — MAGNESIUM SULFATE 500 MG/ML
1 VIAL (ML) INJECTION ONCE
Refills: 0 | Status: COMPLETED | OUTPATIENT
Start: 2020-07-21 | End: 2020-07-21

## 2020-07-21 RX ORDER — ALLOPURINOL 300 MG
1 TABLET ORAL
Qty: 30 | Refills: 0
Start: 2020-07-21

## 2020-07-21 RX ORDER — AMIODARONE HYDROCHLORIDE 400 MG/1
1 TABLET ORAL
Qty: 30 | Refills: 0
Start: 2020-07-21 | End: 2020-08-19

## 2020-07-21 RX ORDER — ALLOPURINOL 300 MG
1 TABLET ORAL
Qty: 0 | Refills: 0 | DISCHARGE

## 2020-07-21 RX ORDER — POLYETHYLENE GLYCOL 3350 17 G/17G
17 POWDER, FOR SOLUTION ORAL
Qty: 0 | Refills: 0 | DISCHARGE
Start: 2020-07-21

## 2020-07-21 RX ORDER — ASPIRIN/CALCIUM CARB/MAGNESIUM 324 MG
1 TABLET ORAL
Qty: 90 | Refills: 0
Start: 2020-07-21 | End: 2020-10-18

## 2020-07-21 RX ADMIN — Medication 300 MILLIGRAM(S): at 12:26

## 2020-07-21 RX ADMIN — Medication 100 GRAM(S): at 05:34

## 2020-07-21 RX ADMIN — MAGNESIUM OXIDE 400 MG ORAL TABLET 400 MILLIGRAM(S): 241.3 TABLET ORAL at 12:25

## 2020-07-21 RX ADMIN — AMIODARONE HYDROCHLORIDE 400 MILLIGRAM(S): 400 TABLET ORAL at 05:34

## 2020-07-21 RX ADMIN — PANTOPRAZOLE SODIUM 40 MILLIGRAM(S): 20 TABLET, DELAYED RELEASE ORAL at 05:35

## 2020-07-21 RX ADMIN — Medication 325 MILLIGRAM(S): at 12:26

## 2020-07-21 RX ADMIN — Medication 600 MILLIGRAM(S): at 05:34

## 2020-07-21 RX ADMIN — Medication 100 MILLIGRAM(S): at 05:34

## 2020-07-21 RX ADMIN — CLOPIDOGREL BISULFATE 75 MILLIGRAM(S): 75 TABLET, FILM COATED ORAL at 12:26

## 2020-07-21 NOTE — PROGRESS NOTE ADULT - SUBJECTIVE AND OBJECTIVE BOX
CTU Attending Progress Daily Note     21 Jul 2020 10:37  Admited 07-13-20, Hospital Day 8d  POD# - 6    HPI: CAD  [69y man]    CC: chest pain    PMH: hypertension, chronic back pain, BPH, GERD, gout, and renal cell carcinoma s/p partial left nephrectomy (9/17/2009)    PSH: Bladder polyp S/p removal (4/4/2019 by Dr. Wylie), TURP (7/27/2005 by Dr. Mendez), laminectomy of L3/L4 (9/11/2002 by Dr. Jara), partial left nephrectomy (9/17/2009 by Dr. Vergara), spinal fusion (L4/5-L5/S1 6/23/2008 by Dr. Jara) with revision of fusion Transome Axial JF (3/1/2020 by Dr. 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 The patient was told the results were abnormal and that he had "a possible blockage." He was scheduled for elective cardiac catheterization with Dr. Fernandes on 7/23/2020. On the day of presentation, the patient had finished eat breakfast ("a bowl of Cheerios for the heart") and was sitting down talking to his wife at home when he began developing back pain behind his left shoulder. He said that the pain was different from the chronic back pain that he usually feels. The pain was 6/10 intensity that did not radiate and was accompanied with chest tightness and nausea. He decided to come to ED, worried that something was wrong. By the time the patient arrived at the hospital, his chest pain had diminished to a 3-4/10 intensity and the nausea had resolved.    In the ED, vital signs were Tmax 98.7F, , /91, RR 16, SpO2 99% on room air. Patient was given aspirin 162mg PO x1 dose. On sign out, ED mentioned possible Q waves on ECG but I did not appreciated them. ECG noted for left anterior fascicular block. Troponin negative x1. Per ED, patient is scheduled in AM for cardiac catheterization with Dr. Fernandes. When I spoke to the patient, the pain had mostly resolved and was now a 1/10. (13 Jul 2020 18:43)    Home Medications:  allopurinol 300 mg oral tablet: 1 tab(s) orally once a day (13 Jul 2020 18:35)  aspirin 81 mg oral tablet: 1 tab(s) orally Tuesday, Thursday, Saturday, Sunday (13 Jul 2020 18:35)  Colace 100 mg oral capsule: 1 cap(s) orally Monday, Wednesday, and Friday (13 Jul 2020 18:35)  Diovan  mg-12.5 mg oral tablet: 1 tab(s) orally once a day (13 Jul 2020 18:35)  Fish Oil 1200 mg oral capsule: 1 cap(s) orally 2 times a day (13 Jul 2020 18:35)  Flax Seed Oil oral capsule: 1200 milligram(s) orally 2 times a day (13 Jul 2020 18:35)  Multiple Vitamins oral tablet: 1 tab(s) orally once a day (13 Jul 2020 18:35)  PriLOSEC OTC 20 mg oral delayed release tablet: 1 tab(s) orally Tuesday, Thursday, Saturday, Sunday (13 Jul 2020 18:35)    FAMILY HISTORY:    PAST MEDICAL & SURGICAL HISTORY:  BPH (benign prostatic hyperplasia)  GERD (gastroesophageal reflux disease)  Back pain  HTN (hypertension)  Gout  Renal cancer, left: Renal cell carcinoma s/p partial left nephrectomy (20% removed)  Bladder polyp: S/p removal 4/4/2019 by Dr. Wylie  H/O spinal fusion: L4/5-L5/S1 6/23/2008 by Dr. Jara with revision of fusion Transome Axial JF 3/1/2020 by Dr. Jara  H/O laminectomy: L3/L4 9/11/2002 by Dr. Marek Clay tumor: Removed 2/3/2006 by Dr. Prieto  H/O cystoscopy: TURP 7/27/2005 by Dr. Mendez  H/O partial nephrectomy: 9/17/2009 by Dr. Vergara    Interval event for past 24 hr:  ESUEBIO MORENO  69y had no event.   Current Complains:  SHYAMEUSEBIO KUO has no new complains  Allergies    adhesives (Rash)  Keflex (Diarrhea)  methylPREDNISolone (Pruritus)  rash (Rash)    Intolerances      OBJECTIVE:  Vitals last 24 hrs  T(C): 36.8 (07-20-20 @ 23:00), Max: 37.2 (07-20-20 @ 12:30)  T(F): 98.2 (07-20-20 @ 23:00), Max: 98.9 (07-20-20 @ 12:30)  HR: 102 (07-21-20 @ 04:00) (80 - 106)  BP: 147/64 (07-21-20 @ 04:00) (144/68 - 193/88)  ABP: --  ABP(mean): --  RR: 26 (07-21-20 @ 04:00) (23 - 49)  SpO2: 94% (07-21-20 @ 04:00) (94% - 97%)    07-20-20 @ 07:01  -  07-21-20 @ 07:00  --------------------------------------------------------  IN:    amiodarone Infusion: 333 mL    Oral Fluid: 510 mL  Total IN: 843 mL    OUT:    Voided: 1325 mL  Total OUT: 1325 mL    Total NET: -482 mL          CAPILLARY BLOOD GLUCOSE      POCT Blood Glucose.: 90 mg/dL (20 Jul 2020 11:14)    LABS:                          12.6   11.69 )-----------( 263      ( 21 Jul 2020 02:30 )             37.2     Hemoglobin: 12.6 g/dL (07-21 @ 02:30)  Hemoglobin: 11.6 g/dL (07-19 @ 23:17)  Hemoglobin: 11.0 g/dL (07-19 @ 00:00)    07-21    141  |  104  |  17  ----------------------------<  118<H>  3.7   |  19  |  0.8    Ca    9.3      21 Jul 2020 02:30  Mg     1.8     07-21    TPro  6.7  /  Alb  4.1  /  TBili  1.1  /  DBili  x   /  AST  41  /  ALT  39  /  AlkPhos  57  07-19    Creatinine, Serum: 0.8 mg/dL (07-21 @ 02:30)  Creatinine, Serum: 1.0 mg/dL (07-19 @ 23:17)  Creatinine, Serum: 0.9 mg/dL (07-19 @ 00:00)  Creatinine, Serum: 0.9 mg/dL (07-18 @ 02:47)          HOSPITAL MEDICATIONS:  MEDICATIONS  (STANDING):  allopurinol 300 milliGRAM(s) Oral daily  aMIOdarone    Tablet   Oral   aMIOdarone    Tablet 400 milliGRAM(s) Oral every 8 hours  aMIOdarone Infusion 1 mG/Min (33.3 mL/Hr) IV Continuous <Continuous>  aspirin enteric coated 325 milliGRAM(s) Oral daily  atorvastatin 40 milliGRAM(s) Oral at bedtime  clopidogrel Tablet 75 milliGRAM(s) Oral daily  dextrose 5%. 1000 milliLiter(s) (50 mL/Hr) IV Continuous <Continuous>  enoxaparin Injectable 40 milliGRAM(s) SubCutaneous at bedtime  guaiFENesin  milliGRAM(s) Oral every 12 hours  magnesium oxide 400 milliGRAM(s) Oral once  magnesium sulfate  IVPB 1 Gram(s) IV Intermittent once  magnesium sulfate  IVPB 1 Gram(s) IV Intermittent every 12 hours  metoprolol tartrate 100 milliGRAM(s) Oral every 12 hours  pantoprazole    Tablet 40 milliGRAM(s) Oral before breakfast  polyethylene glycol 3350 17 Gram(s) Oral daily  senna 1 Tablet(s) Oral every 12 hours    MEDICATIONS  (PRN):  glucagon  Injectable 1 milliGRAM(s) IntraMuscular once PRN Glucose LESS THAN 70 milligrams/deciliter  oxyCODONE    IR 5 milliGRAM(s) Oral every 4 hours PRN Mild Pain (1 - 3)  oxyCODONE    IR 10 milliGRAM(s) Oral every 4 hours PRN Moderate Pain (4 - 6)      REVIEW OF SYSTEMS:  CONSTITUTIONAL: [X] all negative; [ ] weakness, [ ] fevers, [ ] chills  EYES/ENT: [X] all negative; [ ] visual changes, [ ] vertigo, [ ] throat pain, [ ] eye pain  NECK: [X] all negative; [ ] pain, [ ] stiffness  RESPIRATORY: [ ] all negative, [x ] cough, [ ] wheezing, [ ] hemoptysis, [ ] shortness of breath, [x  ] chest pain  CARDIOVASCULAR: [ ] all negative; [ ] anginal chest pain, [ ] palpitations, [ ] orthopnea  GASTROINTESTINAL: [X] all negative; [ ]abdominal pain, [ ] nausea, [ ] vomiting, [ ] hematemesis, [ ] diarrhea, [ ] constipation, [ ] melena, [ ] hematochezia.  GENITOURINARY: [X] all negative; [ ] dysuria, [ ] frequency, [ ] hematuria  NEUROLOGICAL: [X] all negative; [ ] numbness, [ ] weakness, [ ] paresthesias  MUSCULOSKELETAL: [X] all negative, [ ] joint pain, [ ] joint swelling, [ ] joint redness, [ ] bone pain  SKIN: [X] all negative; [ ] itching, [ ] burning, [ ] rashes, [ ] lesions   All other review of systems is negative unless indicated above.    [  ] Unable to assess ROS because     PHYSICAL EXAM:          CONSTITUTIONAL: Well-developed; well-nourished; in no acute distress.   	SKIN: warm, dry, no rashes or lesions  	HEENT: Atraumatic. Normocephalic. PERRL. Moist membranes, no conjunctival injection, sclera clear  	NECK: Supple; non tender.  No JVD. No lymphadenopathy.  	CARD: Normal S1, S2. Rate and Rhythm are regular. No murmurs.  	RESP: Good air entry bilaterally, no wheezes, no rales no rhonchi.  	ABD: Soft, not tender, not distended, no CVA tendernass, no rebound no guarding, bowel sounds present  	EXT: Normal ROM.  No clubbing, no cyanosis, + pedal edema, no calf pain b/l, Peripheral pulses intact.  	LYMPH: No acute cervical adenopathy.  	NEURO: Alert, awake, motor 5/5 R, 5/5 L, sensation intact bilat, CN 2-12 intact,          PSYCH: Cooperative, appropriate. Alert & oriented x 3    RADIOLOGY:  X Reviewed and interpreted by me  CxR from 07-21-20 shows mild congestion, no pneumothorax, no effusion, no cardiomegaly,     ECG:

## 2020-07-21 NOTE — PROGRESS NOTE ADULT - SUBJECTIVE AND OBJECTIVE BOX
OPERATIVE PROCEDURE(s):                POD #                       69yMale  SURGEON(s): CHAS Key  SUBJECTIVE ASSESSMENT: pt seen and examined.   Vital Signs Last 24 Hrs  T(F): 98.2 (20 Jul 2020 23:00), Max: 98.9 (20 Jul 2020 12:30)  HR: 102 (21 Jul 2020 04:00) (80 - 106)  BP: 147/64 (21 Jul 2020 04:00) (144/68 - 193/88)  BP(mean): 92 (21 Jul 2020 04:00) (92 - 127)  RR: 26 (21 Jul 2020 04:00) (23 - 49)  SpO2: 94% (21 Jul 2020 04:00) (94% - 97%)      I&O's Detail    20 Jul 2020 07:01  -  21 Jul 2020 07:00  --------------------------------------------------------  IN:    amiodarone Infusion: 333 mL    Oral Fluid: 510 mL  Total IN: 843 mL    OUT:    Voided: 1325 mL  Total OUT: 1325 mL        Net:   I&O's Detail    19 Jul 2020 07:01  -  20 Jul 2020 07:00  --------------------------------------------------------  Total NET: -296.9 mL      20 Jul 2020 07:01  -  21 Jul 2020 07:00  --------------------------------------------------------  Total NET: -482 mL        CAPILLARY BLOOD GLUCOSE      POCT Blood Glucose.: 90 mg/dL (20 Jul 2020 11:14)    Physical Exam:  General: NAD; A&Ox3/Patient is intubated and sedated  Cardiac: S1/S2, RRR, no murmur, no rubs  Lungs: unlabored respirations, CTA b/l, no wheeze, no rales, no crackles  Abdomen: Soft/NT/ND; positive bowel sounds x 4  Sternum: Intact, no click, incision healing well with no drainage  Incisions: Incisions clean/dry/intact  Extremities: No edema b/l lower extremities; good capillary refill; no cyanosis; palpable 1+ pedal pulses b/l    Central Venous Catheter: Yes[]  No[] , If Yes indication:           Day #  Grigsby Catheter: Yes  [] , No  [] , If yes indication:                      Day #  NGT: Yes [] No [] ,    If Yes Placement:                                     Day #  EPICARDIAL WIRES:  [] YES [] NO                                              Day #  BOWEL MOVEMENT:  [] YES [] NO, If No, Timing since last BM:      Day #  CHEST TUBE(Left/Right):  [] YES [] NO, If yes -  AIR LEAKS:  [] YES [] NO        LABS:                        12.6<L>  11.69<H> )-----------( 263      ( 21 Jul 2020 02:30 )             37.2<L>                        11.6<L>  9.40  )-----------( 226      ( 19 Jul 2020 23:17 )             35.7<L>    07-21    141  |  104  |  17  ----------------------------<  118<H>  3.7   |  19  |  0.8  07-19    144  |  107  |  25<H>  ----------------------------<  98  4.4   |  21  |  1.0    Ca    9.3      21 Jul 2020 02:30  Mg     1.8     07-21    TPro  6.7 [6.0 - 8.0]  /  Alb  4.1 [3.5 - 5.2]  /  TBili  1.1 [0.2 - 1.2]  /  DBili  x   /  AST  41 [0 - 41]  /  ALT  39 [0 - 41]  /  AlkPhos  57 [30 - 115]  07-19          RADIOLOGY & ADDITIONAL TESTS:  CXR:  EKG:  MEDICATIONS  (STANDING):  allopurinol 300 milliGRAM(s) Oral daily  aMIOdarone    Tablet   Oral   aMIOdarone    Tablet 400 milliGRAM(s) Oral every 8 hours  aMIOdarone Infusion 1 mG/Min (33.3 mL/Hr) IV Continuous <Continuous>  aspirin enteric coated 325 milliGRAM(s) Oral daily  atorvastatin 40 milliGRAM(s) Oral at bedtime  clopidogrel Tablet 75 milliGRAM(s) Oral daily  dextrose 5%. 1000 milliLiter(s) (50 mL/Hr) IV Continuous <Continuous>  enoxaparin Injectable 40 milliGRAM(s) SubCutaneous at bedtime  guaiFENesin  milliGRAM(s) Oral every 12 hours  magnesium sulfate  IVPB 1 Gram(s) IV Intermittent every 12 hours  metoprolol tartrate 100 milliGRAM(s) Oral every 12 hours  pantoprazole    Tablet 40 milliGRAM(s) Oral before breakfast  polyethylene glycol 3350 17 Gram(s) Oral daily  senna 1 Tablet(s) Oral every 12 hours    MEDICATIONS  (PRN):  glucagon  Injectable 1 milliGRAM(s) IntraMuscular once PRN Glucose LESS THAN 70 milligrams/deciliter  oxyCODONE    IR 5 milliGRAM(s) Oral every 4 hours PRN Mild Pain (1 - 3)  oxyCODONE    IR 10 milliGRAM(s) Oral every 4 hours PRN Moderate Pain (4 - 6)    HEPARIN:  [] YES [] NO  Dose: XX UNITS/HR UNITS Q8H  LOVENOX:[] YES [] NO  Dose: XX mg Q24H  COUMADIN: []  YES [] NO  Dose: XX mg  Q24H  SCD's: YES b/l  GI Prophylaxis: Protonix [], Pepcid [], None [], (Contra-indication:.....)    Post-Op Beta-Blockers: Yes [], No[], If No, then contraindication:  Post-Op Aspirin: Yes [],  No [], If No, then contraindication:  Post-Op Statin: Yes [], No[], If No, then contraindication:  Allergies    adhesives (Rash)  Keflex (Diarrhea)  methylPREDNISolone (Pruritus)  rash (Rash)    Intolerances      Ambulation/Activity Status:    Assessment/Plan:  69y Male status-post .....  - Case and plan discussed with CTU Intensivist and CT Surgeon - Dr. Koroma/Yesi/Delta   - Continue CTU supportive care    - Continue DVT/GI prophylaxis  - Incentive Spirometry 10 times an hour  - Continue to advance physical activity as tolerated and continue PT/OT as directed  1. CAD: Continue ASA, statin, BB  2. HTN:   3. A. Fib:   4. COPD/Hypoxia:   5. DM/Glucose Control:     Social Service Disposition: OPERATIVE PROCEDURE(s):  CABGx5              POD #    6                   69yMale  SURGEON(s): CHAS Key  SUBJECTIVE ASSESSMENT: pt seen and examined. no acute complaints at this time  Vital Signs Last 24 Hrs  T(F): 98.2 (20 Jul 2020 23:00), Max: 98.9 (20 Jul 2020 12:30)  HR: 102 (21 Jul 2020 04:00) (80 - 106)  BP: 147/64 (21 Jul 2020 04:00) (144/68 - 193/88)  BP(mean): 92 (21 Jul 2020 04:00) (92 - 127)  RR: 26 (21 Jul 2020 04:00) (23 - 49)  SpO2: 94% (21 Jul 2020 04:00) (94% - 97%)      I&O's Detail    20 Jul 2020 07:01  -  21 Jul 2020 07:00  --------------------------------------------------------  IN:    amiodarone Infusion: 333 mL    Oral Fluid: 510 mL  Total IN: 843 mL    OUT:    Voided: 1325 mL  Total OUT: 1325 mL        Net:   I&O's Detail    19 Jul 2020 07:01  -  20 Jul 2020 07:00  --------------------------------------------------------  Total NET: -296.9 mL      20 Jul 2020 07:01  -  21 Jul 2020 07:00  --------------------------------------------------------  Total NET: -482 mL        CAPILLARY BLOOD GLUCOSE      POCT Blood Glucose.: 90 mg/dL (20 Jul 2020 11:14)    Physical Exam:  General: NAD; A&Ox3  Cardiac: S1/S2, RRR, no murmur, no rubs  Lungs: unlabored respirations, CTA b/l, no wheeze, no rales, no crackles  Abdomen: Soft/NT/ND; positive bowel sounds x 4  Sternum: Intact, no click, incision healing well with no drainage  Incisions: Incisions clean/dry/intact  Extremities: No edema b/l lower extremities; good capillary refill; no cyanosis; palpable 1+ pedal pulses b/l    Central Venous Catheter: Yes[]  No[x] , If Yes indication:           Day #  Grigsby Catheter: Yes  [] , No  [x] , If yes indication:                      Day #  NGT: Yes [] No [x] ,    If Yes Placement:                                     Day #  EPICARDIAL WIRES:  [] YES [x] NO                                              Day #  BOWEL MOVEMENT:  [x] YES [] NO, If No, Timing since last BM:      Day #  CHEST TUBE(Left/Right):  [] YES [x] NO, If yes -  AIR LEAKS:  [] YES [] NO        LABS:                        12.6<L>  11.69<H> )-----------( 263      ( 21 Jul 2020 02:30 )             37.2<L>                        11.6<L>  9.40  )-----------( 226      ( 19 Jul 2020 23:17 )             35.7<L>    07-21    141  |  104  |  17  ----------------------------<  118<H>  3.7   |  19  |  0.8  07-19    144  |  107  |  25<H>  ----------------------------<  98  4.4   |  21  |  1.0    Ca    9.3      21 Jul 2020 02:30  Mg     1.8     07-21    TPro  6.7 [6.0 - 8.0]  /  Alb  4.1 [3.5 - 5.2]  /  TBili  1.1 [0.2 - 1.2]  /  DBili  x   /  AST  41 [0 - 41]  /  ALT  39 [0 - 41]  /  AlkPhos  57 [30 - 115]  07-19          RADIOLOGY & ADDITIONAL TESTS:  CXR: < from: Xray Chest 2 Views PA/Lat (07.20.20 @ 08:11) >  Impression:      Left basilar opacity/effusion.    Right basilar linear opacity/atelectasis..    < end of copied text >    EKG:  MEDICATIONS  (STANDING):  allopurinol 300 milliGRAM(s) Oral daily  aMIOdarone    Tablet   Oral   aMIOdarone    Tablet 400 milliGRAM(s) Oral every 8 hours  aMIOdarone Infusion 1 mG/Min (33.3 mL/Hr) IV Continuous <Continuous>  aspirin enteric coated 325 milliGRAM(s) Oral daily  atorvastatin 40 milliGRAM(s) Oral at bedtime  clopidogrel Tablet 75 milliGRAM(s) Oral daily  dextrose 5%. 1000 milliLiter(s) (50 mL/Hr) IV Continuous <Continuous>  enoxaparin Injectable 40 milliGRAM(s) SubCutaneous at bedtime  guaiFENesin  milliGRAM(s) Oral every 12 hours  magnesium sulfate  IVPB 1 Gram(s) IV Intermittent every 12 hours  metoprolol tartrate 100 milliGRAM(s) Oral every 12 hours  pantoprazole    Tablet 40 milliGRAM(s) Oral before breakfast  polyethylene glycol 3350 17 Gram(s) Oral daily  senna 1 Tablet(s) Oral every 12 hours    MEDICATIONS  (PRN):  glucagon  Injectable 1 milliGRAM(s) IntraMuscular once PRN Glucose LESS THAN 70 milligrams/deciliter  oxyCODONE    IR 5 milliGRAM(s) Oral every 4 hours PRN Mild Pain (1 - 3)  oxyCODONE    IR 10 milliGRAM(s) Oral every 4 hours PRN Moderate Pain (4 - 6)    HEPARIN:  [] YES [x] NO  Dose: XX UNITS/HR UNITS Q8H  LOVENOX:[x] YES [] NO  Dose: 40 mg Q24H  COUMADIN: []  YES [x] NO  Dose: XX mg  Q24H  SCD's: YES b/l  GI Prophylaxis: Protonix [x], Pepcid [], None [], (Contra-indication:.....)    Post-Op Beta-Blockers: Yes [x], No[], If No, then contraindication:  Post-Op Aspirin: Yes [x],  No [], If No, then contraindication:  Post-Op Statin: Yes [x], No[], If No, then contraindication:  Allergies    adhesives (Rash)  Keflex (Diarrhea)  methylPREDNISolone (Pruritus)  rash (Rash)    Intolerances      Ambulation/Activity Status: ambulate    Assessment/Plan:  69y Male status-post CABGx5 POD#6  - Case and plan discussed with CTU Intensivist and CT Surgeon - Dr. Koroma/Yesi/Pearl  - Continue CTU supportive care    - Continue DVT/GI prophylaxis  - Incentive Spirometry 10 times an hour  - Continue to advance physical activity as tolerated and continue PT/OT as directed  1. CAD: Continue ASA, statin, BB, plavix, pain control as needed  2. HTN: cont lopressor  3. A. Fib: cont lopressor, amio   4. COPD/Hypoxia: cont nebs, mucinex, encourage incentive spirometry      Social Service Disposition:  home

## 2020-07-21 NOTE — PROGRESS NOTE ADULT - ASSESSMENT
PROBLEMS:  I spent 45 minutes of time examining patient, reviewing vitals, labs, medications, imaging and discussing with the team goals of care to prevent life-threatening in this patient who is at high risk for deterioration or death due to:    1.	CAD s/p CABD x 5 - Lopressor 100 q 12, , Plavix 75, Lipitor 40  2.	A.Fib - amiodarone to 400 q 8   3.	HTN - controlled  4.	d/c wire    PLAN  Neuro: move all 4 extremities. no sensory or motor deficits  Pain management.   oxyCODONE    IR 5 milliGRAM(s) Oral every 4 hours PRN  oxyCODONE    IR 10 milliGRAM(s) Oral every 4 hours PRN    Pulm: Wean off supplemental oxygen as able. Daily CXR. Encourage coughing, deep breathing and use of incentive spirometry.   guaiFENesin  milliGRAM(s) Oral every 12 hours    Cardio: Monitor telemetry/alarms. Continue supportive care   aMIOdarone    Tablet   Oral   aMIOdarone    Tablet 400 milliGRAM(s) Oral every 8 hours  aMIOdarone Infusion 1 mG/Min IV Continuous <Continuous>  metoprolol tartrate 100 milliGRAM(s) Oral every 12 hours    GI: Continue stool softeners.    pantoprazole    Tablet 40 milliGRAM(s) Oral before breakfast  senna 1 Tablet(s) Oral every 12 hours    Nutrition: Continue present diet  Endocrine and glucose control:   allopurinol 300 milliGRAM(s) Oral daily  atorvastatin 40 milliGRAM(s) Oral at bedtime  glucagon  Injectable 1 milliGRAM(s) IntraMuscular once PRN    Renal: monitor urine output, supplement electrolytes as needed,     Vasc: Heparin SC and/or SCDs for DVT prophylaxis  aspirin enteric coated 325 milliGRAM(s) Oral daily  clopidogrel Tablet 75 milliGRAM(s) Oral daily  enoxaparin Injectable 40 milliGRAM(s) SubCutaneous at bedtime    ID: Stable, no fever , no chills. Off antibiotics.    Therapy: OOB/ambulate  Disposition: start planing discharge home or placement    Pertinent clinical, laboratory, radiographic, hemodynamic, echocardiographic, respiratory data, microbiologic data and chart were reviewed and analyzed frequently throughout the course of the day and night. GI and DVT prophylaxis, glycemic control, head of bed elevation and skin care issues were addressed.  Patient seen, examined and plan discussed with CT Surgery / CTICU team during rounds.    [ ] The patient remains in critical and unstable condition, and requires ICU care and monitoring  [ ] The patient is improving but requires continued monitoring and adjustment of therapy

## 2020-07-22 RX ORDER — VALSARTAN AND HYDROCHLOROTHIAZIDE 160; 12.5 MG/1; MG/1
160-12.5 TABLET, FILM COATED ORAL
Refills: 0 | Status: DISCONTINUED | COMMUNITY
End: 2020-07-22

## 2020-07-22 RX ORDER — MULTIVITAMIN
TABLET ORAL DAILY
Refills: 0 | Status: ACTIVE | COMMUNITY
Start: 2020-07-22

## 2020-07-22 RX ORDER — OMEPRAZOLE MAGNESIUM 20 MG/1
20 CAPSULE, DELAYED RELEASE ORAL DAILY
Refills: 0 | Status: ACTIVE | COMMUNITY

## 2020-07-22 RX ORDER — OMEGA-3/DHA/EPA/FISH OIL 1200 MG
1200 CAPSULE ORAL
Refills: 0 | Status: ACTIVE | COMMUNITY
Start: 2020-07-22

## 2020-07-22 RX ORDER — SULFAMETHOXAZOLE AND TRIMETHOPRIM 800; 160 MG/1; MG/1
800-160 TABLET ORAL
Qty: 14 | Refills: 1 | Status: DISCONTINUED | COMMUNITY
Start: 2019-01-18 | End: 2020-07-22

## 2020-07-22 RX ORDER — ASPIRIN 325 MG/1
TABLET, FILM COATED ORAL
Refills: 0 | Status: DISCONTINUED | COMMUNITY
End: 2020-07-22

## 2020-07-23 ENCOUNTER — TRANSCRIPTION ENCOUNTER (OUTPATIENT)
Age: 70
End: 2020-07-23

## 2020-07-24 ENCOUNTER — APPOINTMENT (OUTPATIENT)
Dept: CARE COORDINATION | Facility: HOME HEALTH | Age: 70
End: 2020-07-24
Payer: MEDICARE

## 2020-07-24 PROCEDURE — 99024 POSTOP FOLLOW-UP VISIT: CPT

## 2020-07-27 VITALS — HEIGHT: 68 IN | WEIGHT: 240 LBS | BODY MASS INDEX: 36.37 KG/M2

## 2020-07-27 DIAGNOSIS — Z91.040 LATEX ALLERGY STATUS: ICD-10-CM

## 2020-07-27 DIAGNOSIS — Z85.528 PERSONAL HISTORY OF OTHER MALIGNANT NEOPLASM OF KIDNEY: ICD-10-CM

## 2020-07-27 DIAGNOSIS — Z98.1 ARTHRODESIS STATUS: ICD-10-CM

## 2020-07-27 DIAGNOSIS — I48.91 UNSPECIFIED ATRIAL FIBRILLATION: ICD-10-CM

## 2020-07-27 DIAGNOSIS — R00.0 TACHYCARDIA, UNSPECIFIED: ICD-10-CM

## 2020-07-27 DIAGNOSIS — I65.22 OCCLUSION AND STENOSIS OF LEFT CAROTID ARTERY: ICD-10-CM

## 2020-07-27 DIAGNOSIS — K59.09 OTHER CONSTIPATION: ICD-10-CM

## 2020-07-27 DIAGNOSIS — M54.9 DORSALGIA, UNSPECIFIED: ICD-10-CM

## 2020-07-27 DIAGNOSIS — K21.9 GASTRO-ESOPHAGEAL REFLUX DISEASE WITHOUT ESOPHAGITIS: ICD-10-CM

## 2020-07-27 DIAGNOSIS — I97.89 OTHER POSTPROCEDURAL COMPLICATIONS AND DISORDERS OF THE CIRCULATORY SYSTEM, NOT ELSEWHERE CLASSIFIED: ICD-10-CM

## 2020-07-27 DIAGNOSIS — Z90.5 ACQUIRED ABSENCE OF KIDNEY: ICD-10-CM

## 2020-07-27 DIAGNOSIS — Z87.891 PERSONAL HISTORY OF NICOTINE DEPENDENCE: ICD-10-CM

## 2020-07-27 DIAGNOSIS — I31.9 DISEASE OF PERICARDIUM, UNSPECIFIED: ICD-10-CM

## 2020-07-27 DIAGNOSIS — M10.9 GOUT, UNSPECIFIED: ICD-10-CM

## 2020-07-27 DIAGNOSIS — I25.118 ATHEROSCLEROTIC HEART DISEASE OF NATIVE CORONARY ARTERY WITH OTHER FORMS OF ANGINA PECTORIS: ICD-10-CM

## 2020-07-27 DIAGNOSIS — R09.02 HYPOXEMIA: ICD-10-CM

## 2020-07-27 DIAGNOSIS — I95.9 HYPOTENSION, UNSPECIFIED: ICD-10-CM

## 2020-07-27 DIAGNOSIS — Z88.1 ALLERGY STATUS TO OTHER ANTIBIOTIC AGENTS STATUS: ICD-10-CM

## 2020-07-27 DIAGNOSIS — I44.4 LEFT ANTERIOR FASCICULAR BLOCK: ICD-10-CM

## 2020-07-27 DIAGNOSIS — J44.9 CHRONIC OBSTRUCTIVE PULMONARY DISEASE, UNSPECIFIED: ICD-10-CM

## 2020-07-27 DIAGNOSIS — Z88.8 ALLERGY STATUS TO OTHER DRUGS, MEDICAMENTS AND BIOLOGICAL SUBSTANCES: ICD-10-CM

## 2020-07-27 DIAGNOSIS — G89.29 OTHER CHRONIC PAIN: ICD-10-CM

## 2020-07-27 DIAGNOSIS — Z91.048 OTHER NONMEDICINAL SUBSTANCE ALLERGY STATUS: ICD-10-CM

## 2020-07-27 DIAGNOSIS — I25.84 CORONARY ATHEROSCLEROSIS DUE TO CALCIFIED CORONARY LESION: ICD-10-CM

## 2020-07-27 DIAGNOSIS — E87.2 ACIDOSIS: ICD-10-CM

## 2020-07-27 DIAGNOSIS — I25.119 ATHEROSCLEROTIC HEART DISEASE OF NATIVE CORONARY ARTERY WITH UNSPECIFIED ANGINA PECTORIS: ICD-10-CM

## 2020-07-27 DIAGNOSIS — Z79.82 LONG TERM (CURRENT) USE OF ASPIRIN: ICD-10-CM

## 2020-07-27 DIAGNOSIS — I10 ESSENTIAL (PRIMARY) HYPERTENSION: ICD-10-CM

## 2020-07-27 DIAGNOSIS — M25.512 PAIN IN LEFT SHOULDER: ICD-10-CM

## 2020-07-27 DIAGNOSIS — N40.0 BENIGN PROSTATIC HYPERPLASIA WITHOUT LOWER URINARY TRACT SYMPTOMS: ICD-10-CM

## 2020-07-27 DIAGNOSIS — E83.52 HYPERCALCEMIA: ICD-10-CM

## 2020-07-27 NOTE — HISTORY OF PRESENT ILLNESS
[Home] : at home, [unfilled] , at the time of the visit. [Other Location: e.g. Home (Enter Location, City,State)___] : at [unfilled] [Verbal consent obtained from patient] : the patient, [unfilled] [FreeTextEntry4] : Mimi Delgado [FreeTextEntry1] : FOLLOW YOUR HEART\par \par Hospital Course	 \par This is 69 year-old male with a PMHx of hypertension, chronic back pain, BPH, GERD, gout, and renal cell carcinoma status post partial left nephrectomy (9/17/2009) presented to ED with complaints of non-radiating chest pressure and back pain at rest. The patient ruled-out for AMI via cardiac enzymes. He had an abnormal stress test few months ago and was scheduled for elective cath. He underwent a cardiac catheterization on this admission which revealed severe triple vessel disease. On 07/15/20 the patient underwent myocardial revascularization.  Post-operatively the patient had developed atrial fibrillation and was pharmacologically converted to NSR.  The patient otherwise had an uneventful post-op course and was discharged home in stable condition on PO amiodarone. \par \par He is seen today for a post-op telehealth follow-up visit.  He offers c/o insomnia 2/2 sleeping on his back.

## 2020-07-27 NOTE — PHYSICAL EXAM
[] : no respiratory distress [Examination Of The Chest] : the chest was normal in appearance [Abdomen Soft] : soft [Oriented To Time, Place, And Person] : oriented to person, place, and time [FreeTextEntry1] : + urination without difficulty

## 2020-07-27 NOTE — ASSESSMENT
[FreeTextEntry1] : Overall, patient is progressing well - no concerns identified\par \par Patient's reported VS:\par 7/23 119/66 - 96\par 7/24 119/78 - 97\par \par \par Education\par 1.  DC instructions reviewed with patient - discussed importance of medications (indication, schedule, side effects, and importance of compliance reviewed) and f/u appointments.\par 2.  Clean incision sites daily - shower indirectly, clean with soap and water, pat dry.  Avoid the use of lotions, ointments, powders, and perfumes on or around incision sites.\par 3.  Sternal precautions - avoid any heavy lifting (>5-10 lbs x 8 weeks), raising both arms above head simultaneously.\par 4.  Place TEDs on in AM prior to getting out of bed and off in PM when returning to bed.\par 5.  Follow a heart healthy diet - low salt, low fat.\par 6.  Exercise daily.\par 7.  Monitor and call [facility] NP for signs and symptoms of infection (redness, drainage, and fever).\par 8.  Monitor daily weights (call for a gain of 2-3 lbs/day or 5-6 lbs/week). \par 9.  Blood sugar control necessary for wound healing if applicable.\par 10.  Avoid straining during BM - use stool softners as needed. \par 11.  Instructed on importance of incentive spirometry use (10x/hour).\par \par Patient verbalized understanding of all education outlined above. Pt instructed to call [facility] NP for any issues as delineated above.\par \par PLAN\par 1.  Monitor daily weights\par 2.  Continue current medications as prescribed\par 3.  Incentive spirometry use\par 4.  Maintain sternal precautions\par 5.  Exercise daily\par 5.  Maintain HH diet\par 6.  Maintain TEDs\par 7.  Keep legs elevated\par 8.  F/U appointments - \par Cardio O'Augustin TBD\par CTSx 7/28 Dr. Key\par 9.  [facility] NP will continue to f/u with patient status - Pt agrees to call with any questions/concerns\par 10. To recover without complications.\par 11.  Patient instructed he may use pillows unilaterally to help position/rotate slightly onto his side to better enable sleep

## 2020-07-28 ENCOUNTER — APPOINTMENT (OUTPATIENT)
Dept: CARDIOTHORACIC SURGERY | Facility: CLINIC | Age: 70
End: 2020-07-28
Payer: MEDICARE

## 2020-07-28 VITALS
HEIGHT: 68 IN | WEIGHT: 240 LBS | DIASTOLIC BLOOD PRESSURE: 81 MMHG | BODY MASS INDEX: 36.37 KG/M2 | RESPIRATION RATE: 13 BRPM | TEMPERATURE: 98.6 F | OXYGEN SATURATION: 92 % | SYSTOLIC BLOOD PRESSURE: 129 MMHG | HEART RATE: 86 BPM

## 2020-07-28 PROCEDURE — 99024 POSTOP FOLLOW-UP VISIT: CPT

## 2020-07-28 RX ORDER — POLYETHYLENE GLYCOL 3350 17 G/17G
17 POWDER, FOR SOLUTION ORAL
Refills: 0 | Status: DISCONTINUED | COMMUNITY
Start: 2020-07-22 | End: 2020-07-28

## 2020-07-28 RX ORDER — DOCUSATE SODIUM 100 MG/1
CAPSULE ORAL
Refills: 0 | Status: DISCONTINUED | COMMUNITY
End: 2020-07-28

## 2020-07-28 RX ORDER — OXYCODONE AND ACETAMINOPHEN 5; 325 MG/1; MG/1
5-325 TABLET ORAL
Refills: 0 | Status: DISCONTINUED | COMMUNITY
Start: 2020-07-22 | End: 2020-07-28

## 2020-08-06 ENCOUNTER — APPOINTMENT (OUTPATIENT)
Dept: UROLOGY | Facility: CLINIC | Age: 70
End: 2020-08-06
Payer: MEDICARE

## 2020-08-06 DIAGNOSIS — N13.8 BENIGN PROSTATIC HYPERPLASIA WITH LOWER URINARY TRACT SYMPMS: ICD-10-CM

## 2020-08-06 DIAGNOSIS — N40.1 BENIGN PROSTATIC HYPERPLASIA WITH LOWER URINARY TRACT SYMPMS: ICD-10-CM

## 2020-08-06 PROCEDURE — 99214 OFFICE O/P EST MOD 30 MIN: CPT

## 2020-08-11 ENCOUNTER — APPOINTMENT (OUTPATIENT)
Dept: CARDIOTHORACIC SURGERY | Facility: CLINIC | Age: 70
End: 2020-08-11
Payer: MEDICARE

## 2020-08-11 VITALS
DIASTOLIC BLOOD PRESSURE: 62 MMHG | HEIGHT: 68 IN | SYSTOLIC BLOOD PRESSURE: 101 MMHG | BODY MASS INDEX: 35.92 KG/M2 | HEART RATE: 78 BPM | RESPIRATION RATE: 12 BRPM | OXYGEN SATURATION: 95 % | WEIGHT: 237 LBS | TEMPERATURE: 98.9 F

## 2020-08-11 DIAGNOSIS — Z95.1 PRESENCE OF AORTOCORONARY BYPASS GRAFT: ICD-10-CM

## 2020-08-11 PROCEDURE — 99024 POSTOP FOLLOW-UP VISIT: CPT

## 2020-08-11 RX ORDER — ALLOPURINOL 300 MG/1
300 TABLET ORAL DAILY
Refills: 0 | Status: ACTIVE | COMMUNITY

## 2020-08-11 RX ORDER — METOPROLOL TARTRATE 100 MG/1
100 TABLET, FILM COATED ORAL TWICE DAILY
Qty: 60 | Refills: 0 | Status: ACTIVE | COMMUNITY
Start: 2020-07-22 | End: 1900-01-01

## 2020-08-11 RX ORDER — ATORVASTATIN CALCIUM 40 MG/1
40 TABLET, FILM COATED ORAL DAILY
Qty: 30 | Refills: 0 | Status: ACTIVE | COMMUNITY
Start: 2020-07-22 | End: 1900-01-01

## 2020-08-20 ENCOUNTER — TRANSCRIPTION ENCOUNTER (OUTPATIENT)
Age: 70
End: 2020-08-20

## 2020-08-26 ENCOUNTER — APPOINTMENT (OUTPATIENT)
Dept: CARDIOTHORACIC SURGERY | Facility: CLINIC | Age: 70
End: 2020-08-26

## 2020-08-26 VITALS
DIASTOLIC BLOOD PRESSURE: 70 MMHG | WEIGHT: 237 LBS | BODY MASS INDEX: 35.92 KG/M2 | HEIGHT: 68 IN | SYSTOLIC BLOOD PRESSURE: 139 MMHG | RESPIRATION RATE: 12 BRPM | OXYGEN SATURATION: 97 % | TEMPERATURE: 98.4 F | HEART RATE: 79 BPM

## 2020-08-26 DIAGNOSIS — Z95.1 PRESENCE OF AORTOCORONARY BYPASS GRAFT: ICD-10-CM

## 2020-09-13 ENCOUNTER — EMERGENCY (EMERGENCY)
Facility: HOSPITAL | Age: 70
LOS: 0 days | Discharge: HOME | End: 2020-09-13
Attending: EMERGENCY MEDICINE | Admitting: EMERGENCY MEDICINE
Payer: MEDICARE

## 2020-09-13 VITALS
RESPIRATION RATE: 18 BRPM | OXYGEN SATURATION: 97 % | HEIGHT: 68 IN | SYSTOLIC BLOOD PRESSURE: 201 MMHG | TEMPERATURE: 98 F | WEIGHT: 238.1 LBS | HEART RATE: 97 BPM | DIASTOLIC BLOOD PRESSURE: 103 MMHG

## 2020-09-13 VITALS — DIASTOLIC BLOOD PRESSURE: 83 MMHG | SYSTOLIC BLOOD PRESSURE: 181 MMHG

## 2020-09-13 DIAGNOSIS — Z79.01 LONG TERM (CURRENT) USE OF ANTICOAGULANTS: ICD-10-CM

## 2020-09-13 DIAGNOSIS — D11.9 BENIGN NEOPLASM OF MAJOR SALIVARY GLAND, UNSPECIFIED: Chronic | ICD-10-CM

## 2020-09-13 DIAGNOSIS — Z98.890 OTHER SPECIFIED POSTPROCEDURAL STATES: ICD-10-CM

## 2020-09-13 DIAGNOSIS — Z98.1 ARTHRODESIS STATUS: Chronic | ICD-10-CM

## 2020-09-13 DIAGNOSIS — Z88.8 ALLERGY STATUS TO OTHER DRUGS, MEDICAMENTS AND BIOLOGICAL SUBSTANCES: ICD-10-CM

## 2020-09-13 DIAGNOSIS — J44.9 CHRONIC OBSTRUCTIVE PULMONARY DISEASE, UNSPECIFIED: ICD-10-CM

## 2020-09-13 DIAGNOSIS — D41.4 NEOPLASM OF UNCERTAIN BEHAVIOR OF BLADDER: Chronic | ICD-10-CM

## 2020-09-13 DIAGNOSIS — Z79.899 OTHER LONG TERM (CURRENT) DRUG THERAPY: ICD-10-CM

## 2020-09-13 DIAGNOSIS — Z90.5 ACQUIRED ABSENCE OF KIDNEY: Chronic | ICD-10-CM

## 2020-09-13 DIAGNOSIS — Z98.890 OTHER SPECIFIED POSTPROCEDURAL STATES: Chronic | ICD-10-CM

## 2020-09-13 DIAGNOSIS — Z95.1 PRESENCE OF AORTOCORONARY BYPASS GRAFT: ICD-10-CM

## 2020-09-13 DIAGNOSIS — Z91.09 OTHER ALLERGY STATUS, OTHER THAN TO DRUGS AND BIOLOGICAL SUBSTANCES: ICD-10-CM

## 2020-09-13 DIAGNOSIS — I10 ESSENTIAL (PRIMARY) HYPERTENSION: ICD-10-CM

## 2020-09-13 PROCEDURE — 99282 EMERGENCY DEPT VISIT SF MDM: CPT

## 2020-09-13 NOTE — ED ADULT NURSE NOTE - PMH
Back pain    BPH (benign prostatic hyperplasia)    GERD (gastroesophageal reflux disease)    Gout    HTN (hypertension)    Renal cancer, left  Renal cell carcinoma s/p partial left nephrectomy (20% removed)

## 2020-09-13 NOTE — ED ADULT NURSE NOTE - OBJECTIVE STATEMENT
Patient came in with complaints of high BP, according to patient he was watching TV and checked his BP and it was high and made him come to ER, has mild chest discomfort. Denies palpitations, N/V.

## 2020-09-13 NOTE — ED ADULT NURSE NOTE - PSH
Bladder polyp  S/p removal 4/4/2019 by Dr. Wylie  H/O cystoscopy  TURP 7/27/2005 by Dr. Mendez  H/O laminectomy  L3/L4 9/11/2002 by Dr. Jara  H/O partial nephrectomy  9/17/2009 by Dr. Vergara  H/O spinal fusion  L4/5-L5/S1 6/23/2008 by Dr. 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 Prieto

## 2020-12-23 ENCOUNTER — FORM ENCOUNTER (OUTPATIENT)
Age: 70
End: 2020-12-23

## 2020-12-24 ENCOUNTER — OUTPATIENT (OUTPATIENT)
Dept: INPATIENT UNIT | Facility: HOSPITAL | Age: 70
LOS: 1 days | Discharge: HOME | End: 2020-12-24

## 2020-12-24 ENCOUNTER — TRANSCRIPTION ENCOUNTER (OUTPATIENT)
Age: 70
End: 2020-12-24

## 2020-12-24 ENCOUNTER — APPOINTMENT (OUTPATIENT)
Dept: DISASTER EMERGENCY | Facility: CLINIC | Age: 70
End: 2020-12-24

## 2020-12-24 VITALS
DIASTOLIC BLOOD PRESSURE: 80 MMHG | RESPIRATION RATE: 16 BRPM | TEMPERATURE: 99 F | SYSTOLIC BLOOD PRESSURE: 135 MMHG | HEART RATE: 109 BPM | OXYGEN SATURATION: 93 %

## 2020-12-24 VITALS
HEART RATE: 97 BPM | RESPIRATION RATE: 16 BRPM | SYSTOLIC BLOOD PRESSURE: 142 MMHG | OXYGEN SATURATION: 95 % | DIASTOLIC BLOOD PRESSURE: 80 MMHG | TEMPERATURE: 99 F

## 2020-12-24 DIAGNOSIS — U07.1 COVID-19: ICD-10-CM

## 2020-12-24 DIAGNOSIS — Z98.1 ARTHRODESIS STATUS: Chronic | ICD-10-CM

## 2020-12-24 DIAGNOSIS — Z90.5 ACQUIRED ABSENCE OF KIDNEY: Chronic | ICD-10-CM

## 2020-12-24 DIAGNOSIS — Z98.890 OTHER SPECIFIED POSTPROCEDURAL STATES: Chronic | ICD-10-CM

## 2020-12-24 DIAGNOSIS — D11.9 BENIGN NEOPLASM OF MAJOR SALIVARY GLAND, UNSPECIFIED: Chronic | ICD-10-CM

## 2020-12-24 DIAGNOSIS — D41.4 NEOPLASM OF UNCERTAIN BEHAVIOR OF BLADDER: Chronic | ICD-10-CM

## 2020-12-24 RX ORDER — BAMLANIVIMAB 35 MG/ML
700 INJECTION, SOLUTION INTRAVENOUS ONCE
Refills: 0 | Status: COMPLETED | OUTPATIENT
Start: 2020-12-24 | End: 2020-12-24

## 2020-12-24 RX ADMIN — BAMLANIVIMAB 200 MILLIGRAM(S): 35 INJECTION, SOLUTION INTRAVENOUS at 14:50

## 2020-12-25 ENCOUNTER — TRANSCRIPTION ENCOUNTER (OUTPATIENT)
Age: 70
End: 2020-12-25

## 2020-12-26 NOTE — CHART NOTE - PRIOR COVID TREATMENT
Medicine Progress Note    Patient is a 70y old  Male who presents with a chief complaint of cough, Fever Max 100.7 , body aches, Headaches,   Patient Poxs is 96% in RA.  Patient had Heart surgery few weeks ago. patient get Covid positive in 12/15/20.      SUBJECTIVE / OVERNIGHT EVENTS:    ADDITIONAL REVIEW OF SYSTEMS:    MEDICATIONS  (STANDING):    MEDICATIONS  (PRN):    CAPILLARY BLOOD GLUCOSE        I&O's Summary      PHYSICAL EXAM:  Vital Signs Last 24 Hrs  T(C): --  T(F): --  HR: --  BP: --  BP(mean): --  RR: --  SpO2: --  CONSTITUTIONAL: NAD, well-developed, well-groomed  ENMT: Moist oral mucosa, no pharyngeal injection or exudates; normal dentition  RESPIRATORY: Normal respiratory effort; lungs are clear to auscultation bilaterally  CARDIOVASCULAR: Regular rate and rhythm, normal S1 and S2, no murmur/rub/gallop; No lower extremity edema; Peripheral pulses are 2+ bilaterally  ABDOMEN: Nontender to palpation, normoactive bowel sounds, no rebound/guarding; No hepatosplenomegaly  PSYCH: A+O to person, place, and time; affect appropriate  NEUROLOGY: CN 2-12 are intact and symmetric; no gross sensory deficits   SKIN: No rashes; no palpable lesions    LABS:                    COVID-19 PCR: NotDetec (13 Jul 2020 16:00)      RADIOLOGY & ADDITIONAL TESTS:  Imaging from Last 24 Hours:    Electrocardiogram/QTc Interval:    COORDINATION OF CARE: Patient completed infusion safely.  Patient had one time elevated TEMP is 100.7.  Patient had Tylenol 2 tab po x1 .  Recheck Temp back normal , No any other complain. discharge home with Booklet information safely.   Care Discussed with Consultants/Other Providers:

## 2021-08-12 ENCOUNTER — APPOINTMENT (OUTPATIENT)
Dept: UROLOGY | Facility: CLINIC | Age: 71
End: 2021-08-12
Payer: MEDICARE

## 2021-08-12 PROCEDURE — 99213 OFFICE O/P EST LOW 20 MIN: CPT

## 2021-08-12 RX ORDER — AMIODARONE HYDROCHLORIDE 200 MG/1
200 TABLET ORAL DAILY
Qty: 30 | Refills: 1 | Status: DISCONTINUED | COMMUNITY
Start: 2020-07-22 | End: 2021-08-12

## 2021-08-12 RX ORDER — ASPIRIN 81 MG
81 TABLET, DELAYED RELEASE (ENTERIC COATED) ORAL
Refills: 0 | Status: ACTIVE | COMMUNITY

## 2021-08-12 RX ORDER — ASPIRIN 325 MG/1
325 TABLET, FILM COATED ORAL DAILY
Refills: 0 | Status: DISCONTINUED | COMMUNITY
Start: 2020-07-22 | End: 2021-08-12

## 2022-02-13 ENCOUNTER — INPATIENT (INPATIENT)
Facility: HOSPITAL | Age: 72
LOS: 0 days | Discharge: HOME | End: 2022-02-14
Attending: INTERNAL MEDICINE | Admitting: INTERNAL MEDICINE
Payer: MEDICARE

## 2022-02-13 ENCOUNTER — EMERGENCY (EMERGENCY)
Facility: HOSPITAL | Age: 72
LOS: 0 days | Discharge: HOME | End: 2022-02-13
Admitting: EMERGENCY MEDICINE

## 2022-02-13 VITALS
OXYGEN SATURATION: 96 % | DIASTOLIC BLOOD PRESSURE: 77 MMHG | WEIGHT: 240.08 LBS | TEMPERATURE: 96 F | RESPIRATION RATE: 18 BRPM | SYSTOLIC BLOOD PRESSURE: 187 MMHG | HEIGHT: 68 IN | HEART RATE: 83 BPM

## 2022-02-13 DIAGNOSIS — Z90.5 ACQUIRED ABSENCE OF KIDNEY: Chronic | ICD-10-CM

## 2022-02-13 DIAGNOSIS — Z20.822 CONTACT WITH AND (SUSPECTED) EXPOSURE TO COVID-19: ICD-10-CM

## 2022-02-13 DIAGNOSIS — D11.9 BENIGN NEOPLASM OF MAJOR SALIVARY GLAND, UNSPECIFIED: Chronic | ICD-10-CM

## 2022-02-13 DIAGNOSIS — K21.9 GASTRO-ESOPHAGEAL REFLUX DISEASE WITHOUT ESOPHAGITIS: ICD-10-CM

## 2022-02-13 DIAGNOSIS — Z88.8 ALLERGY STATUS TO OTHER DRUGS, MEDICAMENTS AND BIOLOGICAL SUBSTANCES STATUS: ICD-10-CM

## 2022-02-13 DIAGNOSIS — I10 ESSENTIAL (PRIMARY) HYPERTENSION: ICD-10-CM

## 2022-02-13 DIAGNOSIS — M10.9 GOUT, UNSPECIFIED: ICD-10-CM

## 2022-02-13 DIAGNOSIS — Z98.890 OTHER SPECIFIED POSTPROCEDURAL STATES: Chronic | ICD-10-CM

## 2022-02-13 DIAGNOSIS — Z88.1 ALLERGY STATUS TO OTHER ANTIBIOTIC AGENTS STATUS: ICD-10-CM

## 2022-02-13 DIAGNOSIS — D41.4 NEOPLASM OF UNCERTAIN BEHAVIOR OF BLADDER: Chronic | ICD-10-CM

## 2022-02-13 DIAGNOSIS — R29.810 FACIAL WEAKNESS: ICD-10-CM

## 2022-02-13 DIAGNOSIS — Z98.1 ARTHRODESIS STATUS: Chronic | ICD-10-CM

## 2022-02-13 DIAGNOSIS — I25.10 ATHEROSCLEROTIC HEART DISEASE OF NATIVE CORONARY ARTERY WITHOUT ANGINA PECTORIS: ICD-10-CM

## 2022-02-13 LAB
ALBUMIN SERPL ELPH-MCNC: 4.3 G/DL — SIGNIFICANT CHANGE UP (ref 3.5–5.2)
ALP SERPL-CCNC: 81 U/L — SIGNIFICANT CHANGE UP (ref 30–115)
ALT FLD-CCNC: 20 U/L — SIGNIFICANT CHANGE UP (ref 0–41)
ANION GAP SERPL CALC-SCNC: 14 MMOL/L — SIGNIFICANT CHANGE UP (ref 7–14)
APTT BLD: 34.5 SEC — SIGNIFICANT CHANGE UP (ref 27–39.2)
AST SERPL-CCNC: 22 U/L — SIGNIFICANT CHANGE UP (ref 0–41)
BASOPHILS # BLD AUTO: 0.03 K/UL — SIGNIFICANT CHANGE UP (ref 0–0.2)
BASOPHILS NFR BLD AUTO: 0.3 % — SIGNIFICANT CHANGE UP (ref 0–1)
BILIRUB SERPL-MCNC: 1.1 MG/DL — SIGNIFICANT CHANGE UP (ref 0.2–1.2)
BUN SERPL-MCNC: 18 MG/DL — SIGNIFICANT CHANGE UP (ref 10–20)
CALCIUM SERPL-MCNC: 9.6 MG/DL — SIGNIFICANT CHANGE UP (ref 8.5–10.1)
CHLORIDE SERPL-SCNC: 101 MMOL/L — SIGNIFICANT CHANGE UP (ref 98–110)
CO2 SERPL-SCNC: 25 MMOL/L — SIGNIFICANT CHANGE UP (ref 17–32)
CREAT SERPL-MCNC: 0.8 MG/DL — SIGNIFICANT CHANGE UP (ref 0.7–1.5)
EOSINOPHIL # BLD AUTO: 0.09 K/UL — SIGNIFICANT CHANGE UP (ref 0–0.7)
EOSINOPHIL NFR BLD AUTO: 1 % — SIGNIFICANT CHANGE UP (ref 0–8)
GLUCOSE SERPL-MCNC: 118 MG/DL — HIGH (ref 70–99)
HCT VFR BLD CALC: 51 % — SIGNIFICANT CHANGE UP (ref 42–52)
HGB BLD-MCNC: 16.5 G/DL — SIGNIFICANT CHANGE UP (ref 14–18)
IMM GRANULOCYTES NFR BLD AUTO: 0.3 % — SIGNIFICANT CHANGE UP (ref 0.1–0.3)
INR BLD: 0.94 RATIO — SIGNIFICANT CHANGE UP (ref 0.65–1.3)
LYMPHOCYTES # BLD AUTO: 1.33 K/UL — SIGNIFICANT CHANGE UP (ref 1.2–3.4)
LYMPHOCYTES # BLD AUTO: 14.5 % — LOW (ref 20.5–51.1)
MCHC RBC-ENTMCNC: 30.4 PG — SIGNIFICANT CHANGE UP (ref 27–31)
MCHC RBC-ENTMCNC: 32.4 G/DL — SIGNIFICANT CHANGE UP (ref 32–37)
MCV RBC AUTO: 93.9 FL — SIGNIFICANT CHANGE UP (ref 80–94)
MONOCYTES # BLD AUTO: 0.71 K/UL — HIGH (ref 0.1–0.6)
MONOCYTES NFR BLD AUTO: 7.7 % — SIGNIFICANT CHANGE UP (ref 1.7–9.3)
NEUTROPHILS # BLD AUTO: 6.98 K/UL — HIGH (ref 1.4–6.5)
NEUTROPHILS NFR BLD AUTO: 76.2 % — HIGH (ref 42.2–75.2)
NRBC # BLD: 0 /100 WBCS — SIGNIFICANT CHANGE UP (ref 0–0)
PLATELET # BLD AUTO: 217 K/UL — SIGNIFICANT CHANGE UP (ref 130–400)
POTASSIUM SERPL-MCNC: 4.1 MMOL/L — SIGNIFICANT CHANGE UP (ref 3.5–5)
POTASSIUM SERPL-SCNC: 4.1 MMOL/L — SIGNIFICANT CHANGE UP (ref 3.5–5)
PROT SERPL-MCNC: 6.9 G/DL — SIGNIFICANT CHANGE UP (ref 6–8)
PROTHROM AB SERPL-ACNC: 10.8 SEC — SIGNIFICANT CHANGE UP (ref 9.95–12.87)
RBC # BLD: 5.43 M/UL — SIGNIFICANT CHANGE UP (ref 4.7–6.1)
RBC # FLD: 14.5 % — SIGNIFICANT CHANGE UP (ref 11.5–14.5)
SARS-COV-2 RNA SPEC QL NAA+PROBE: SIGNIFICANT CHANGE UP
SODIUM SERPL-SCNC: 140 MMOL/L — SIGNIFICANT CHANGE UP (ref 135–146)
TROPONIN T SERPL-MCNC: <0.01 NG/ML — SIGNIFICANT CHANGE UP
WBC # BLD: 9.17 K/UL — SIGNIFICANT CHANGE UP (ref 4.8–10.8)
WBC # FLD AUTO: 9.17 K/UL — SIGNIFICANT CHANGE UP (ref 4.8–10.8)

## 2022-02-13 PROCEDURE — 70496 CT ANGIOGRAPHY HEAD: CPT | Mod: 26,MA

## 2022-02-13 PROCEDURE — 0042T: CPT

## 2022-02-13 PROCEDURE — G0425: CPT | Mod: 95

## 2022-02-13 PROCEDURE — 99285 EMERGENCY DEPT VISIT HI MDM: CPT

## 2022-02-13 PROCEDURE — 70498 CT ANGIOGRAPHY NECK: CPT | Mod: 26,MA

## 2022-02-13 PROCEDURE — 71045 X-RAY EXAM CHEST 1 VIEW: CPT | Mod: 26

## 2022-02-13 PROCEDURE — 93010 ELECTROCARDIOGRAM REPORT: CPT

## 2022-02-13 RX ORDER — ALLOPURINOL 300 MG
300 TABLET ORAL DAILY
Refills: 0 | Status: DISCONTINUED | OUTPATIENT
Start: 2022-02-13 | End: 2022-02-14

## 2022-02-13 RX ORDER — OMEGA-3 ACID ETHYL ESTERS 1 G
1 CAPSULE ORAL
Qty: 0 | Refills: 0 | DISCHARGE

## 2022-02-13 RX ORDER — PANTOPRAZOLE SODIUM 20 MG/1
40 TABLET, DELAYED RELEASE ORAL
Refills: 0 | Status: DISCONTINUED | OUTPATIENT
Start: 2022-02-13 | End: 2022-02-14

## 2022-02-13 RX ORDER — CHOLECALCIFEROL (VITAMIN D3) 125 MCG
1 CAPSULE ORAL
Qty: 0 | Refills: 0 | DISCHARGE

## 2022-02-13 RX ORDER — ASPIRIN/CALCIUM CARB/MAGNESIUM 324 MG
81 TABLET ORAL DAILY
Refills: 0 | Status: DISCONTINUED | OUTPATIENT
Start: 2022-02-13 | End: 2022-02-14

## 2022-02-13 RX ORDER — ENOXAPARIN SODIUM 100 MG/ML
40 INJECTION SUBCUTANEOUS AT BEDTIME
Refills: 0 | Status: DISCONTINUED | OUTPATIENT
Start: 2022-02-13 | End: 2022-02-14

## 2022-02-13 RX ORDER — ATORVASTATIN CALCIUM 80 MG/1
40 TABLET, FILM COATED ORAL AT BEDTIME
Refills: 0 | Status: DISCONTINUED | OUTPATIENT
Start: 2022-02-13 | End: 2022-02-14

## 2022-02-13 RX ORDER — LABETALOL HCL 100 MG
10 TABLET ORAL ONCE
Refills: 0 | Status: COMPLETED | OUTPATIENT
Start: 2022-02-13 | End: 2022-02-13

## 2022-02-13 RX ORDER — VALSARTAN 80 MG/1
320 TABLET ORAL DAILY
Refills: 0 | Status: DISCONTINUED | OUTPATIENT
Start: 2022-02-13 | End: 2022-02-14

## 2022-02-13 RX ORDER — METOPROLOL TARTRATE 50 MG
100 TABLET ORAL EVERY 12 HOURS
Refills: 0 | Status: DISCONTINUED | OUTPATIENT
Start: 2022-02-13 | End: 2022-02-14

## 2022-02-13 RX ORDER — CETIRIZINE HYDROCHLORIDE 10 MG/1
10 TABLET ORAL DAILY
Refills: 0 | Status: DISCONTINUED | OUTPATIENT
Start: 2022-02-13 | End: 2022-02-13

## 2022-02-13 RX ORDER — DOCUSATE SODIUM 100 MG
1 CAPSULE ORAL
Qty: 0 | Refills: 0 | DISCHARGE

## 2022-02-13 RX ADMIN — Medication 100 MILLIGRAM(S): at 19:28

## 2022-02-13 RX ADMIN — ATORVASTATIN CALCIUM 40 MILLIGRAM(S): 80 TABLET, FILM COATED ORAL at 22:06

## 2022-02-13 RX ADMIN — ENOXAPARIN SODIUM 40 MILLIGRAM(S): 100 INJECTION SUBCUTANEOUS at 22:06

## 2022-02-13 NOTE — ED PROVIDER NOTE - NSICDXPASTMEDICALHX_GEN_ALL_CORE_FT
PAST MEDICAL HISTORY:  Back pain     GERD (gastroesophageal reflux disease)     Gout     HTN (hypertension)     Renal cancer, left Renal cell carcinoma s/p partial left nephrectomy (20% removed)

## 2022-02-13 NOTE — PATIENT PROFILE ADULT - FLU SEASON?
Yes... Epidermal Autograft Text: The defect edges were debeveled with a #15 scalpel blade.  Given the location of the defect, shape of the defect and the proximity to free margins an epidermal autograft was deemed most appropriate.  Using a sterile surgical marker, the primary defect shape was transferred to the donor site. The epidermal graft was then harvested.  The skin graft was then placed in the primary defect and oriented appropriately.

## 2022-02-13 NOTE — ED PROVIDER NOTE - ATTENDING CONTRIBUTION TO CARE
Pt reports since yesterday he noted weakness to the left side of the face. no headache, no numbness anywhere, no weakness anywhere. Able to speak and eat. Ambulation unaffected. On exam  weakness left face on smiling, mild difficulty closing left eye, Able to raise eye brown but the upper forehead remains flat. This is most likely a bells , though small possibility of cva. Stroke code called. NIH score of 2.

## 2022-02-13 NOTE — ED PROVIDER NOTE - OBJECTIVE STATEMENT
71 year old male past medical history of Coronary Artery Disease comes to emergency room for left facial droop. As per patient last night around 10pm his spouse stated that his face looked funny and felt fine elsie went to bed. patient states that this am he was shaving and noted his face to be "not right" and believed maybe it was bells and came to emergency room. patient denies fever/chills. no headache, no dizziness, no numbness no weakness, no trouble tasting, seeing, no trouble walking.

## 2022-02-13 NOTE — H&P ADULT - NSHPPHYSICALEXAM_GEN_ALL_CORE
CONSTITUTIONAL: No acute distress, well-developed, well-groomed, AAOx3  HEAD: Atraumatic, normocephalic  EYES: EOM intact, PERRLA, conjunctiva and sclera clear  ENT: Supple, no masses, no thyromegaly, no bruits, no JVD; moist mucous membranes  PULMONARY: Clear to auscultation bilaterally; no wheezes, rales, or rhonchi  CARDIOVASCULAR: Regular rate and rhythm; no murmurs, rubs, or gallops  GASTROINTESTINAL: Soft, non-tender, non-distended; bowel sounds present  MUSCULOSKELETAL: 2+ peripheral pulses; no clubbing, no cyanosis, no edema  NEUROLOGY: moves all ext, sensations intact in all ext, AOx4, non-focal  SKIN: No rashes or lesions; warm and dry

## 2022-02-13 NOTE — H&P ADULT - ASSESSMENT
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

## 2022-02-13 NOTE — ED PROVIDER NOTE - CLINICAL SUMMARY MEDICAL DECISION MAKING FREE TEXT BOX
Pt presented with a 12 hour history of weakness to the left sided of the face. His exam was convincing for the lower motor neuron on the left thought the upper motor neuron was partially weak. Stroke code called. Ct scan neg. Pt seen via Tele med by Dr. Martini who though it more consistent with Alyssa. Advised MRI in patient. Hx of HTN

## 2022-02-13 NOTE — H&P ADULT - NSHPLABSRESULTS_GEN_ALL_CORE
Assessment & Plan     Shira Marsh is a 73 year old female with the following diagnoses:   1. Intermittent exotropia, alternating    2. Subjective visual disturbance    3. Internuclear ophthalmoplegia, unspecified laterality       73 year old woman presented for follow up on her new onset ptosis and diplopia. She noticed that her double vision and ptosis started improving after she was discharged from the hospital. She currently does not complain of diplopia, in fact it was completely resolved when she left the hospital ( hospitalized for high BP)    MRI brain 7/12/2020  Impression:  1. No acute infarction.  2. Changes of moderately advanced chronic small vessel ischemic disease.  3. Scattered foci of susceptibility in the cerebral hemispheres, basal ganglia, midbrain, david and cerebellum, likely representing chronic  Microhemorrhages. 4. 8 mm heterogeneously enhancing right frontal dural based irregular lesion. This is indeterminate. After evaluation of noncontrast CT  appearance, arterial (CTA) and venous enhancement (MR postgd) patterns, and basic MR signal characteristics, the findings are not very typical of a meningioma. A dural based metastasis in differential  though, there is no malignancy history in clinical records. Short interval follow up can be considered in couple weeks to follow up this finding.   5. Head MRA demonstrates no definite aneurysm or stenosis of the major  intracranial arteries.  5. Neck MRA demonstrates patent major cervical arteries    Acetylcholine Block Hannah: 11  Acetylcholine modul Hannah:0  Acetylcholine Block Hannah: 0  B12 high  B1 very high    Ice test was negative last visit    PMH: HTN, arthritis     Visual acuity 20/25 in the right eye and 20/30 in the left eye both eye.  No fatigability with prolonged upgaze.  Levator function 16 millimeters RIGHT eye and 17 millimeters left eye.  MRD1 0.5 millimeters RIGHT eye and 0 millimeters left eye.  She has Good saccades and  pursuits.  Good orbicularis strength.  Anterior segment exam unremarkable both eyes. 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  Her misalignment has improved and her eye movements have improved consistent with this diagnosis.  Her ptosis remains the same as the photographs taken at her last visit indicating that this is likely longstanding.  Follow up in 2 months or sooner if her symptoms worsens                Attending Physician Attestation:  Complete documentation of historical and exam elements from today's encounter can be found in the full encounter summary report (not reduplicated in this progress note).  I personally obtained the chief complaint(s) and history of present illness.  I confirmed and edited as necessary the review of systems, past medical/surgical history, family history, social history, and examination findings as documented by others; and I examined the patient myself.  I personally reviewed the relevant tests, images, and reports as documented above.  I formulated and edited as necessary the assessment and plan and discussed the findings and management plan with the patient and family. I personally reviewed the ophthalmic test(s) associated with this encounter, agree with the interpretation(s) as documented by the resident/fellow, and have edited the corresponding report(s) as necessary.  - Tico Denson MD  Neuro-ophthalmology fellow  [facility]     LABS:                        16.5   9.17  )-----------( 217      ( 13 Feb 2022 10:50 )             51.0     13 Feb 2022 10:50    140    |  101    |  18     ----------------------------<  118    4.1     |  25     |  0.8      Ca    9.6        13 Feb 2022 10:50    TPro  6.9    /  Alb  4.3    /  TBili  1.1    /  DBili  x      /  AST  22     /  ALT  20     /  AlkPhos  81     13 Feb 2022 10:50    PT/INR - ( 13 Feb 2022 10:50 )   PT: 10.80 sec;   INR: 0.94 ratio         PTT - ( 13 Feb 2022 10:50 )  PTT:34.5 sec    < from: Xray Chest 1 View AP/PA (02.13.22 @ 11:29) >    Impression:    Status post a median sternotomy.    No acute infiltrates.    --- End of Report ---    < end of copied text >    < from: CT Angio Neck w/ IV Cont (02.13.22 @ 10:59) >    MPRESSION:  CT brain:  No CT evidence of acute transcortical infarct, acute intracranial   hemorrhage, or mass effect.    CT brain perfusion:  No evidence of acute infarct or ischemia.    CTA neck:  Mild stenosis of the origins of the internal carotid arteries bilaterally.  Marked hypertrophy of the thyroid gland with multiple enhancing nodules   bilaterally measuring up to 1.3 cm. Enlargement of the inferior, medial,   and superior rectus muscles bilaterally resulting in bilateral   exophthalmos.    CTA brain:  Bilateral V4 segments calcified plaques resulting in mild stenosis   bilaterally.    Findings discussed with MONCHO Kelsey on 2/13/2022 at 10:32 AM.    < end of copied text >

## 2022-02-13 NOTE — ED PEDIATRIC NURSE REASSESSMENT NOTE - NS ED NURSE REASSESS COMMENT FT2
Report given to Lindsay in ICU - tele. Pt stable at this time. Alert and oriented. IV intact. Pending transfer at this time.

## 2022-02-13 NOTE — ED PROVIDER NOTE - PHYSICAL EXAMINATION
Physical Exam    Vital Signs: I have reviewed the initial vital signs.  Constitutional: well-nourished, appears stated age, no acute distress  Eyes: Conjunctiva pink, Sclera clear, PERRLA, EOMI.  Cardiovascular: S1 and S2, regular rate, regular rhythm, well-perfused extremities, radial pulses equal and 2+  Respiratory: unlabored respiratory effort, clear to auscultation bilaterally no wheezing, rales and rhonchi  Gastrointestinal: soft, non-tender abdomen, no pulsatile mass, normal bowl sounds  Musculoskeletal: supple neck, no lower extremity edema, no midline tenderness  Integumentary: warm, dry, no rash  Neurologic: awake, alert, cranial nerves II-XII grossly except weakness left face on smiling, mild difficulty closing left eye, Able to raise eye brown but the upper forehead remains flat, extremities’ motor and sensory functions grossly intact, cerballar intact, strength 5/5, ambulatory   Psychiatric: appropriate mood, appropriate affect

## 2022-02-13 NOTE — ED PROVIDER NOTE - NSICDXPASTSURGICALHX_GEN_ALL_CORE_FT
PAST SURGICAL HISTORY:  Bladder polyp S/p removal 4/4/2019 by Dr. Wylie    H/O cystoscopy TURP 7/27/2005 by Dr. Mendez    H/O laminectomy L3/L4 9/11/2002 by Dr. Jara    H/O partial nephrectomy 9/17/2009 by Dr. Vergara    H/O spinal fusion L4/5-L5/S1 6/23/2008 by Dr. Jara with revision of fusion Transome Axial JF 3/1/2020 by Dr. Marek Alonso's tumor Removed 2/3/2006 by Dr. Prieto

## 2022-02-13 NOTE — PATIENT PROFILE ADULT - HOW PATIENT ADDRESSED, PROFILE
The following records have been requested from [facility]:       [id number]    Please send EKG dated 12/02/2020 via fax to [phone]. Thank you! Robb

## 2022-02-13 NOTE — H&P ADULT - HISTORY OF PRESENT ILLNESS
male with PMH of hypertension, chronic back pain, BPH, GERD, gout, and renal cell carcinoma s/p partial left nephrectomy (9/17/2009) who presented to the ED for acute left-sided chest pain. 70 yo male with CAD, s/p CABG 5x, hypertension, chronic back pain, BPH, GERD, gout, and renal cell carcinoma s/p partial left nephrectomy  who presented due to left facial drop.    the pt wife noted left facial droop last night, this morning the facial drop was stil, present so he decied to come to ER,   there is no weakness, vision changes, syncope, seizure,  headache, numbness, dizziness, double vision, abd or chest pain,     in ER; CTH, CTA and CTP were negative, admitted to r/u small stroke and TIA.

## 2022-02-13 NOTE — STROKE CODE NOTE - ASSESSMENT/PLAN
TIFFANIE EUSEBIO [birth year] MRN [id number]  02/13/2022 10:33 a.m.  [facility].  77M presented with L facial droop started 12 hours prior to ED (LKN 9 p.m. 02/12).  h/o Hypertension renal cancer, GERD.  on ASA.  in ED NIHSS 3 (L facial droop, unable to resist eye opening on L - L orbicularis oculi weak but able to furrow and raise eyebrows - weakly).  CT NEG for acute transcortical infarction or bleed.  CTA no large vessel occlusion.  CTP no mismatch.  Consider Bell's palsy, cannot entirely rule out small brainstem stroke.  Discussed with ED attending - Obs + MRI prior to dispo.

## 2022-02-13 NOTE — PATIENT PROFILE ADULT - FALL HARM RISK - UNIVERSAL INTERVENTIONS
Bed in lowest position, wheels locked, appropriate side rails in place/Call bell, personal items and telephone in reach/Instruct patient to call for assistance before getting out of bed or chair/Non-slip footwear when patient is out of bed/[place] to call system/Physically safe environment - no spills, clutter or unnecessary equipment/Purposeful Proactive Rounding/Room/bathroom lighting operational, light cord in reach

## 2022-02-14 ENCOUNTER — TRANSCRIPTION ENCOUNTER (OUTPATIENT)
Age: 72
End: 2022-02-14

## 2022-02-14 VITALS
SYSTOLIC BLOOD PRESSURE: 144 MMHG | OXYGEN SATURATION: 95 % | DIASTOLIC BLOOD PRESSURE: 79 MMHG | RESPIRATION RATE: 18 BRPM | HEART RATE: 95 BPM

## 2022-02-14 LAB
A1C WITH ESTIMATED AVERAGE GLUCOSE RESULT: 6.1 % — HIGH (ref 4–5.6)
ALBUMIN SERPL ELPH-MCNC: 4 G/DL — SIGNIFICANT CHANGE UP (ref 3.5–5.2)
ALP SERPL-CCNC: 78 U/L — SIGNIFICANT CHANGE UP (ref 30–115)
ALT FLD-CCNC: 19 U/L — SIGNIFICANT CHANGE UP (ref 0–41)
ANION GAP SERPL CALC-SCNC: 15 MMOL/L — HIGH (ref 7–14)
AST SERPL-CCNC: 19 U/L — SIGNIFICANT CHANGE UP (ref 0–41)
BASOPHILS # BLD AUTO: 0.02 K/UL — SIGNIFICANT CHANGE UP (ref 0–0.2)
BASOPHILS NFR BLD AUTO: 0.3 % — SIGNIFICANT CHANGE UP (ref 0–1)
BILIRUB SERPL-MCNC: 1.5 MG/DL — HIGH (ref 0.2–1.2)
BUN SERPL-MCNC: 15 MG/DL — SIGNIFICANT CHANGE UP (ref 10–20)
CALCIUM SERPL-MCNC: 9.8 MG/DL — SIGNIFICANT CHANGE UP (ref 8.5–10.1)
CHLORIDE SERPL-SCNC: 102 MMOL/L — SIGNIFICANT CHANGE UP (ref 98–110)
CHOLEST SERPL-MCNC: 151 MG/DL — SIGNIFICANT CHANGE UP
CO2 SERPL-SCNC: 24 MMOL/L — SIGNIFICANT CHANGE UP (ref 17–32)
CREAT SERPL-MCNC: 0.9 MG/DL — SIGNIFICANT CHANGE UP (ref 0.7–1.5)
EOSINOPHIL # BLD AUTO: 0.1 K/UL — SIGNIFICANT CHANGE UP (ref 0–0.7)
EOSINOPHIL NFR BLD AUTO: 1.4 % — SIGNIFICANT CHANGE UP (ref 0–8)
ESTIMATED AVERAGE GLUCOSE: 128 MG/DL — HIGH (ref 68–114)
GLUCOSE BLDC GLUCOMTR-MCNC: 116 MG/DL — HIGH (ref 70–99)
GLUCOSE SERPL-MCNC: 114 MG/DL — HIGH (ref 70–99)
HCT VFR BLD CALC: 49.3 % — SIGNIFICANT CHANGE UP (ref 42–52)
HDLC SERPL-MCNC: 46 MG/DL — SIGNIFICANT CHANGE UP
HGB BLD-MCNC: 16.5 G/DL — SIGNIFICANT CHANGE UP (ref 14–18)
IMM GRANULOCYTES NFR BLD AUTO: 0.3 % — SIGNIFICANT CHANGE UP (ref 0.1–0.3)
LIPID PNL WITH DIRECT LDL SERPL: 80 MG/DL — SIGNIFICANT CHANGE UP
LYMPHOCYTES # BLD AUTO: 1.71 K/UL — SIGNIFICANT CHANGE UP (ref 1.2–3.4)
LYMPHOCYTES # BLD AUTO: 23.4 % — SIGNIFICANT CHANGE UP (ref 20.5–51.1)
MAGNESIUM SERPL-MCNC: 1.4 MG/DL — LOW (ref 1.8–2.4)
MCHC RBC-ENTMCNC: 31.2 PG — HIGH (ref 27–31)
MCHC RBC-ENTMCNC: 33.5 G/DL — SIGNIFICANT CHANGE UP (ref 32–37)
MCV RBC AUTO: 93.2 FL — SIGNIFICANT CHANGE UP (ref 80–94)
MONOCYTES # BLD AUTO: 0.69 K/UL — HIGH (ref 0.1–0.6)
MONOCYTES NFR BLD AUTO: 9.4 % — HIGH (ref 1.7–9.3)
NEUTROPHILS # BLD AUTO: 4.78 K/UL — SIGNIFICANT CHANGE UP (ref 1.4–6.5)
NEUTROPHILS NFR BLD AUTO: 65.2 % — SIGNIFICANT CHANGE UP (ref 42.2–75.2)
NON HDL CHOLESTEROL: 105 MG/DL — SIGNIFICANT CHANGE UP
NRBC # BLD: 0 /100 WBCS — SIGNIFICANT CHANGE UP (ref 0–0)
PLATELET # BLD AUTO: 180 K/UL — SIGNIFICANT CHANGE UP (ref 130–400)
POTASSIUM SERPL-MCNC: 3.5 MMOL/L — SIGNIFICANT CHANGE UP (ref 3.5–5)
POTASSIUM SERPL-SCNC: 3.5 MMOL/L — SIGNIFICANT CHANGE UP (ref 3.5–5)
PROT SERPL-MCNC: 6.9 G/DL — SIGNIFICANT CHANGE UP (ref 6–8)
RBC # BLD: 5.29 M/UL — SIGNIFICANT CHANGE UP (ref 4.7–6.1)
RBC # FLD: 14.6 % — HIGH (ref 11.5–14.5)
SODIUM SERPL-SCNC: 141 MMOL/L — SIGNIFICANT CHANGE UP (ref 135–146)
TRIGL SERPL-MCNC: 222 MG/DL — HIGH
WBC # BLD: 7.32 K/UL — SIGNIFICANT CHANGE UP (ref 4.8–10.8)
WBC # FLD AUTO: 7.32 K/UL — SIGNIFICANT CHANGE UP (ref 4.8–10.8)

## 2022-02-14 PROCEDURE — 99222 1ST HOSP IP/OBS MODERATE 55: CPT

## 2022-02-14 PROCEDURE — 99232 SBSQ HOSP IP/OBS MODERATE 35: CPT

## 2022-02-14 PROCEDURE — 70551 MRI BRAIN STEM W/O DYE: CPT | Mod: 26

## 2022-02-14 PROCEDURE — 99233 SBSQ HOSP IP/OBS HIGH 50: CPT

## 2022-02-14 RX ORDER — MAGNESIUM SULFATE 500 MG/ML
2 VIAL (ML) INJECTION ONCE
Refills: 0 | Status: COMPLETED | OUTPATIENT
Start: 2022-02-14 | End: 2022-02-14

## 2022-02-14 RX ORDER — BACITRACIN ZINC AND POLYMYXIN B SULFATE 500; 10000 [USP'U]/G; [USP'U]/G
1 OINTMENT OPHTHALMIC
Qty: 1 | Refills: 0
Start: 2022-02-14 | End: 2022-02-23

## 2022-02-14 RX ORDER — VALACYCLOVIR 500 MG/1
1 TABLET, FILM COATED ORAL
Qty: 21 | Refills: 0
Start: 2022-02-14 | End: 2022-02-20

## 2022-02-14 RX ORDER — VALACYCLOVIR 500 MG/1
1000 TABLET, FILM COATED ORAL THREE TIMES A DAY
Refills: 0 | Status: DISCONTINUED | OUTPATIENT
Start: 2022-02-14 | End: 2022-02-14

## 2022-02-14 RX ADMIN — VALSARTAN 320 MILLIGRAM(S): 80 TABLET ORAL at 05:54

## 2022-02-14 RX ADMIN — VALACYCLOVIR 1000 MILLIGRAM(S): 500 TABLET, FILM COATED ORAL at 18:17

## 2022-02-14 RX ADMIN — Medication 80 MILLIGRAM(S): at 18:38

## 2022-02-14 RX ADMIN — Medication 100 MILLIGRAM(S): at 18:37

## 2022-02-14 RX ADMIN — Medication 100 MILLIGRAM(S): at 05:54

## 2022-02-14 RX ADMIN — PANTOPRAZOLE SODIUM 40 MILLIGRAM(S): 20 TABLET, DELAYED RELEASE ORAL at 06:00

## 2022-02-14 RX ADMIN — Medication 81 MILLIGRAM(S): at 11:21

## 2022-02-14 RX ADMIN — Medication 25 GRAM(S): at 10:46

## 2022-02-14 RX ADMIN — Medication 300 MILLIGRAM(S): at 11:21

## 2022-02-14 NOTE — DISCHARGE NOTE PROVIDER - NSDCMRMEDTOKEN_GEN_ALL_CORE_FT
allopurinol 300 mg oral tablet: 1 tab(s) orally once a day  Artificial Tears ophthalmic solution: 1 drop(s) in each eye 4 times a day   aspirin 81 mg oral tablet, chewable: 1 tab(s) orally once a day  atorvastatin 40 mg oral tablet: 1 tab(s) orally once a day (at bedtime)  bacitracin-polymyxin B 500 units-10,000 units/g ophthalmic ointment: 1 application in each affected eye 4 times a day   metoprolol tartrate 100 mg oral tablet: 1 tab(s) orally every 12 hours  predniSONE 20 mg oral tablet: 4 tab(s) orally once a day  PriLOSEC OTC 20 mg oral delayed release tablet: 1 tab(s) orally Tuesday, Thursday, Saturday, Sunday  valsartan 320 mg oral tablet: 1 tab(s) orally once a day  Valtrex 1 g oral tablet: 1 tab(s) orally 3 times a day   ZyrTEC 10 mg oral tablet: 1 tab(s) orally once a day

## 2022-02-14 NOTE — DISCHARGE NOTE NURSING/CASE MANAGEMENT/SOCIAL WORK - PATIENT PORTAL LINK FT
You can access the FollowMyHealth Patient Portal offered by [facility] by registering at the following website: [email or web address]. By joining [employer]’s FollowMyHealth portal, you will also be able to view your health information using other applications (apps) compatible with our system.

## 2022-02-14 NOTE — CONSULT NOTE ADULT - SUBJECTIVE AND OBJECTIVE BOX
Neurology Consult    Pt is a 71M admitted for left sided facial droop - CVA vs bell's palsy. Pt had onset of left facial droop Saturday night, which was worse upon waking up in the morning. + facial weakness. Sensation and vision intact. CTH negative, CTA showing mild areas of stenosis. Denies HA, dizziness, fever/chills, vision changes.       HPI:  70 yo male with CAD, s/p CABG 5x, hypertension, chronic back pain, BPH, GERD, gout, and renal cell carcinoma s/p partial left nephrectomy  who presented due to left facial drop.    the pt wife noted left facial droop last night, this morning the facial drop was stil, present so he decied to come to ER,   there is no weakness, vision changes, syncope, seizure,  headache, numbness, dizziness, double vision, abd or chest pain,     in ER; CTH, CTA and CTP were negative, admitted to r/u small stroke and TIA.  (13 Feb 2022 13:28)      PAST MEDICAL & SURGICAL HISTORY:  Renal cancer, left  Renal cell carcinoma s/p partial left nephrectomy (20% removed)  Gout  HTN (hypertension)  Back pain  GERD (gastroesophageal reflux disease)  H/O partial nephrectomy - 9/17/2009 by Dr. Vergara  H/O cystoscopy - TURP 7/27/2005 by Dr. Vanessa Alonso's tumor - Removed 2/3/2006 by Dr. Prieto  H/O laminectomy - L3/L4 9/11/2002 by Dr. Jara  H/O spinal fusion - L4/5-L5/S1 6/23/2008 by Dr. Jara with revision of fusion Transome Axial JF 3/1/2020 by Dr. Jara  Bladder polyp - S/p removal 4/4/2019 by Dr. Wylie        Social History: (-) x 3    Allergies  adhesives (Rash)  Keflex (Diarrhea)  methylPREDNISolone (Pruritus)  rash (Rash)        MEDICATIONS  (STANDING):  allopurinol 300 milliGRAM(s) Oral daily  aspirin  chewable 81 milliGRAM(s) Oral daily  atorvastatin 40 milliGRAM(s) Oral at bedtime  enoxaparin Injectable 40 milliGRAM(s) SubCutaneous at bedtime  metoprolol tartrate 100 milliGRAM(s) Oral every 12 hours  pantoprazole    Tablet 40 milliGRAM(s) Oral before breakfast  predniSONE   Tablet 80 milliGRAM(s) Oral daily  valACYclovir 1000 milliGRAM(s) Oral three times a day  valsartan 320 milliGRAM(s) Oral daily    MEDICATIONS  (PRN):      Vital Signs Last 24 Hrs  T(C): 36.8 (14 Feb 2022 07:43), Max: 36.8 (14 Feb 2022 07:43)  T(F): 98.2 (14 Feb 2022 07:43), Max: 98.2 (14 Feb 2022 07:43)  HR: 72 (14 Feb 2022 07:35) (69 - 93)  BP: 135/71 (14 Feb 2022 07:35) (112/78 - 198/74)  BP(mean): 96 (14 Feb 2022 07:35) (96 - 96)  RR: 18 (14 Feb 2022 03:00) (17 - 18)  SpO2: 95% (14 Feb 2022 07:35) (95% - 96%)    Examination:  General:  Appearance is consistent with chronologic age.  No abnormal facies.   Cognitive/Language:  The patient is oriented to person, place, time and date.  Recent and remote memory intact.  Fund of knowledge is intact and normal.  Language with normal repetition, comprehension and naming.  Mild dysarthria    Eyes: intact VA, VFF.  EOMI w/o nystagmus, skew or reported double vision.  PERRL.  +weakness of eyelid closure.    Face:  Facial sensation normal V1 - 3, +left sided facial droop / weakness including eyelid closure and forehead  Ears/Nose/Throat:  Hearing grossly intact b/l.  Palate elevates midline.  Tongue and uvula midline.   Motor examination:   Normal tone, bulk and range of motion.  No tenderness, twitching, tremors or involuntary movements.  Formal Muscle Strength Testing: (MRC grade R/L) 5/5 UE; 5/5 LE.  No observable drift.  Sensory examination:   Intact to light touch and pinprick all extremities.  Cerebellum:  No dysmetria or dysdiadokinesia.     Labs:                         16.5   7.32  )-----------( 180      ( 14 Feb 2022 05:30 )             49.3     02-14    141  |  102  |  15  ----------------------------<  114<H>  3.5   |  24  |  0.9    Ca    9.8      14 Feb 2022 05:30  Mg     1.4     02-14    TPro  6.9  /  Alb  4.0  /  TBili  1.5<H>  /  DBili  x   /  AST  19  /  ALT  19  /  AlkPhos  78  02-14    LIVER FUNCTIONS - ( 14 Feb 2022 05:30 )  Alb: 4.0 g/dL / Pro: 6.9 g/dL / ALK PHOS: 78 U/L / ALT: 19 U/L / AST: 19 U/L / GGT: x           PT/INR - ( 13 Feb 2022 10:50 )   PT: 10.80 sec;   INR: 0.94 ratio         PTT - ( 13 Feb 2022 10:50 )  PTT:34.5 sec      < from: CT Angio Neck w/ IV Cont (02.13.22 @ 10:59) >  IMPRESSION:  CT brain:  No CT evidence of acute transcortical infarct, acute intracranial   hemorrhage, or mass effect.    CT brain perfusion:  No evidence of acute infarct or ischemia.    CTA neck:  Mild stenosis of the origins of the internal carotid arteries bilaterally.  Marked hypertrophy of the thyroid gland with multiple enhancing nodules   bilaterally measuring up to 1.3 cm. Enlargement of the inferior, medial,   and superior rectus muscles bilaterally resulting in bilateral   exophthalmos.    CTA brain:  Bilateral V4 segments calcified plaques resulting in mild stenosis   bilaterally.    Findings discussed with MONCHO Kelsey on 2/13/2022 at 10:32 AM.    COMMENT:  Reference per NASCET criteria for degree of stenosis: Mild: less than 50%   stenosis. Moderate: 50-69% stenosis. Severe: 70-94% stenosis. Near   occlusion: 95-99% stenosis.    Per RAPID assessment for acute infarct, threshold for ischemic tissue   volume is Tmax > 6 sec. Threshold for infarct core volume estimate is CBF   < 30 percent. Threshold for poor collateral flow or severe delayed   perfusion is Tmax > 10 sec.    < end of copied text >

## 2022-02-14 NOTE — PROGRESS NOTE ADULT - SUBJECTIVE AND OBJECTIVE BOX
Patient is a 71y old  Male who presents with a chief complaint of facial drop (14 Feb 2022 10:27)      T(F): 98.2 (02-14-22 @ 07:43), Max: 98.2 (02-14-22 @ 07:43)  HR: 72 (02-14-22 @ 07:35)  BP: 135/71 (02-14-22 @ 07:35)  RR: 18 (02-14-22 @ 03:00)  SpO2: 95% (02-14-22 @ 07:35) (93% - 96%)    PHYSICAL EXAM:  GENERAL: NAD  HEAD:  Atraumatic, Normocephalic  EYES: EOMI, PERRLA, conjunctiva and sclera clear  NERVOUS SYSTEM:  L facial droop with L forehead involvement, Alert & Oriented X3, no focal motor deficits   CHEST/LUNG: Clear to percussion bilaterally; No rales, rhonchi, wheezing, or rubs  HEART: Regular rate and rhythm; No murmurs, rubs, or gallops  ABDOMEN: Soft, Nontender, Nondistended; Bowel sounds present  EXTREMITIES:  2+ Peripheral Pulses, No clubbing, cyanosis, or edema    LABS  02-14    141  |  102  |  15  ----------------------------<  114<H>  3.5   |  24  |  0.9    Ca    9.8      14 Feb 2022 05:30  Mg     1.4     02-14    TPro  6.9  /  Alb  4.0  /  TBili  1.5<H>  /  DBili  x   /  AST  19  /  ALT  19  /  AlkPhos  78  02-14                          16.5   7.32  )-----------( 180      ( 14 Feb 2022 05:30 )             49.3     PT/INR - ( 13 Feb 2022 10:50 )   PT: 10.80 sec;   INR: 0.94 ratio         PTT - ( 13 Feb 2022 10:50 )  PTT:34.5 sec    CARDIAC ENZYMES    Troponin T, Serum: <0.01 ng/mL (02-13-22 @ 10:50)    RADIOLOGY  < from: Xray Chest 1 View AP/PA (02.13.22 @ 11:29) >  Impression:    Status post a median sternotomy.    No acute infiltrates.    < end of copied text >    MEDICATIONS  (STANDING):  allopurinol 300 milliGRAM(s) Oral daily  aspirin  chewable 81 milliGRAM(s) Oral daily  atorvastatin 40 milliGRAM(s) Oral at bedtime  enoxaparin Injectable 40 milliGRAM(s) SubCutaneous at bedtime  magnesium sulfate  IVPB 2 Gram(s) IV Intermittent once  metoprolol tartrate 100 milliGRAM(s) Oral every 12 hours  pantoprazole    Tablet 40 milliGRAM(s) Oral before breakfast  valsartan 320 milliGRAM(s) Oral daily    MEDICATIONS  (PRN):    
SUBJECTIVE:    Patient is a 71y old Male who presents with a chief complaint of facial drop (14 Feb 2022 12:42)    Currently admitted to medicine with the primary diagnosis of Facial weakness       Today is hospital day 1d. This morning he is resting comfortably in bed and reports no new issues or overnight events.     Review of Systems:  General: denies fevers, chills, or weight changes  HEENT: denies headaches, visual changes, or vertigo  Resp: denies worsening dsypnea or cough  CVS: denies chest pain or palpitations  GI: denies diarrhea, abdominal pain, or melena  : denies dysuria, hematuria, freuquency or urgency  Neuro/MSK: denies weakness or memory changes    PAST MEDICAL & SURGICAL HISTORY  Renal cancer, left  Renal cell carcinoma s/p partial left nephrectomy (20% removed)    Gout    HTN (hypertension)    Back pain    GERD (gastroesophageal reflux disease)    H/O partial nephrectomy  9/17/2009 by Dr. Vergara    H/O cystoscopy  TURP 7/27/2005 by Dr. Vanessa Alonso&#x27;s tumor  Removed 2/3/2006 by Dr. Prieto    H/O laminectomy  L3/L4 9/11/2002 by Dr. Jara    H/O spinal fusion  L4/5-L5/S1 6/23/2008 by Dr. Jara with revision of fusion Transome Axial JF 3/1/2020 by Dr. Jara    Bladder polyp  S/p removal 4/4/2019 by Dr. Wylie        ALLERGIES:  adhesives (Rash)  Keflex (Diarrhea)  methylPREDNISolone (Pruritus)  rash (Rash)    MEDICATIONS:  STANDING MEDICATIONS  allopurinol 300 milliGRAM(s) Oral daily  aspirin  chewable 81 milliGRAM(s) Oral daily  atorvastatin 40 milliGRAM(s) Oral at bedtime  enoxaparin Injectable 40 milliGRAM(s) SubCutaneous at bedtime  metoprolol tartrate 100 milliGRAM(s) Oral every 12 hours  pantoprazole    Tablet 40 milliGRAM(s) Oral before breakfast  predniSONE   Tablet 80 milliGRAM(s) Oral daily  valACYclovir 1000 milliGRAM(s) Oral three times a day  valsartan 320 milliGRAM(s) Oral daily    PRN MEDICATIONS    VITALS:   T(F): 98.2  HR: 67  BP: 127/69  RR: 18  SpO2: 95%    LABS:                        16.5   7.32  )-----------( 180      ( 14 Feb 2022 05:30 )             49.3     02-14    141  |  102  |  15  ----------------------------<  114<H>  3.5   |  24  |  0.9    Ca    9.8      14 Feb 2022 05:30  Mg     1.4     02-14    TPro  6.9  /  Alb  4.0  /  TBili  1.5<H>  /  DBili  x   /  AST  19  /  ALT  19  /  AlkPhos  78  02-14    PT/INR - ( 13 Feb 2022 10:50 )   PT: 10.80 sec;   INR: 0.94 ratio         PTT - ( 13 Feb 2022 10:50 )  PTT:34.5 sec          CARDIAC MARKERS ( 13 Feb 2022 10:50 )  x     / <0.01 ng/mL / x     / x     / x          RADIOLOGY:    PHYSICAL EXAM:  GEN: No acute distress  LUNGS: Clear to auscultation bilaterally   HEART: S1/S2 present. RRR.   ABD: Soft, non-tender, non-distended. Bowel sounds present  EXT: NC/NC/NE/2+PP/ALBARADO/Skin Intact.   NEURO: AAOX3    Intravenous access: PIV      
Patient is a 71y old  Male who presents with a chief complaint of facial drop (13 Feb 2022 13:28)    HPI:  72 yo male with CAD, s/p CABG 5x, hypertension, chronic back pain, BPH, GERD, gout, and renal cell carcinoma s/p partial left nephrectomy  who presented due to left facial drop.    the pt wife noted left facial droop last night, this morning the facial drop was stil, present so he decied to come to ER,   there is no weakness, vision changes, syncope, seizure,  headache, numbness, dizziness, double vision, abd or chest pain,     in ER; CTH, CTA and CTP were negative, admitted to r/u small stroke and TIA.  (13 Feb 2022 13:28)      PAST MEDICAL & SURGICAL HISTORY:  Renal cancer, left  Renal cell carcinoma s/p partial left nephrectomy (20% removed)  Gout  HTN (hypertension)  Back pain  GERD (gastroesophageal reflux disease)  H/O partial nephrectomy 9/17/2009 by Dr. Vergara  H/O cystoscopy  TURP 7/27/2005 by Dr. Mendez  H/O laminectomy  L3/L4 9/11/2002 by Dr. Jara  H/O spinal fusion  L4/5-L5/S1 6/23/2008 by Dr. Jara with revision of fusion Transome Axial JF 3/1/2020 by Dr. Jara  Bladder polyp  S/p removal 4/4/2019 by Dr. Wylie    SOCIAL HX:  negative    FAMILY HISTORY:  :  No known cardiovascular family history     Review Of Systems:   All ROS are negative except per HPI     Allergies    adhesives (Rash)  Keflex (Diarrhea)  methylPREDNISolone (Pruritus)  rash (Rash)    Intolerances    PHYSICAL EXAM  ICU Vital Signs Last 24 Hrs  T(C): 36.8 (14 Feb 2022 07:43), Max: 36.8 (14 Feb 2022 07:43)  T(F): 98.2 (14 Feb 2022 07:43), Max: 98.2 (14 Feb 2022 07:43)  HR: 72 (14 Feb 2022 07:35) (69 - 93)  BP: 135/71 (14 Feb 2022 07:35) (112/78 - 198/74)  BP(mean): 96 (14 Feb 2022 07:35) (96 - 96)  RR: 18 (14 Feb 2022 03:00) (17 - 18)  SpO2: 95% (14 Feb 2022 07:35) (93% - 96%)      CONSTITUTIONAL:  Well nourished.  NAD    ENT:   Airway patent,   Mouth with normal mucosa.   No thrush    EYES:   pupils equal,   round and reactive to light.    CARDIAC:   Normal rate,   Regular rhythm.    Heart sounds S1, S2.   No edema    RESPIRATORY:   No wheezing   Normal chest expansion  No use of accessory muscles    GASTROINTESTINAL:  Abdomen soft   Non-tender,   No guarding,   + BS    MUSCULOSKELETAL:   Range of motion is not limited,  No clubbing, cyanosis    NEUROLOGICAL:   Alert and oriented   Left facial droop  No motor or sensory deficits.    SKIN:   Skin normal color for race,   Warm and dry  No evidence of rash.    PSYCHIATRIC:   Normal mood and affect.   No apparent risk to self or others.    HEME LYMPH:   No cervical  lymphadenopathy.  No inguinal lymphadenopathy      02-13-22 @ 07:01  -  02-14-22 @ 07:00  --------------------------------------------------------  IN:  Total IN: 0 mL    OUT:    Voided (mL): 400 mL  Total OUT: 400 mL    Total NET: -400 mL      LABS:                16.5   7.32  )-----------( 180      ( 14 Feb 2022 05:30 )             49.3     141  |  102  |  15  ----------------------------<  114<H>  3.5   |  24  |  0.9    Ca    9.8      14 Feb 2022 05:30  Mg     1.4     02-14    TPro  6.9  /  Alb  4.0  /  TBili  1.5<H>  /  DBili  x   /  AST  19  /  ALT  19  /  AlkPhos  78  02-14    PT/INR - ( 13 Feb 2022 10:50 )   PT: 10.80 sec;   INR: 0.94 ratio    PTT - ( 13 Feb 2022 10:50 )  PTT:34.5 sec                                           CARDIAC MARKERS ( 13 Feb 2022 10:50 )  x     / <0.01 ng/mL / x     / x     / x         LIVER FUNCTIONS - ( 14 Feb 2022 05:30 )  Alb: 4.0 g/dL / Pro: 6.9 g/dL / ALK PHOS: 78 U/L / ALT: 19 U/L / AST: 19 U/L / GGT: x                                                                      X-Rays reviewed:                                                                                    ECHO    CXR interpreted by me:  no acute cardiopulmonary pathology    MEDICATIONS  (STANDING):  allopurinol 300 milliGRAM(s) Oral daily  aspirin  chewable 81 milliGRAM(s) Oral daily  atorvastatin 40 milliGRAM(s) Oral at bedtime  enoxaparin Injectable 40 milliGRAM(s) SubCutaneous at bedtime  metoprolol tartrate 100 milliGRAM(s) Oral every 12 hours  pantoprazole    Tablet 40 milliGRAM(s) Oral before breakfast  valsartan 320 milliGRAM(s) Oral daily    MEDICATIONS  (PRN):

## 2022-02-14 NOTE — DISCHARGE NOTE PROVIDER - NSDCCPCAREPLAN_GEN_ALL_CORE_FT
PRINCIPAL DISCHARGE DIAGNOSIS  Diagnosis: Facial weakness  Assessment and Plan of Treatment:       SECONDARY DISCHARGE DIAGNOSES  Diagnosis: Facial muscle weakness  Assessment and Plan of Treatment:     Diagnosis: Bell's palsy  Assessment and Plan of Treatment:

## 2022-02-14 NOTE — DISCHARGE NOTE PROVIDER - HOSPITAL COURSE
70 yo male with CAD, s/p CABG 5x, hypertension, chronic back pain, BPH, GERD, gout, and renal cell carcinoma s/p partial left nephrectomy  who presented due to left facial drop.    the pt wife noted left facial droop last night, this morning the facial drop was stil, present so he decied to come to ER,   there is no weakness, vision changes, syncope, seizure,  headache, numbness, dizziness, double vision, abd or chest pain,     in ER; CTH, CTA and CTP were negative, admitted to r/u small stroke and TIA.       during hospital stay patient had an unremakable MRI with no worsening neurological symptoms.  patient evaluated by neurology who believes condition is related to bells palsy.  patient started on steroids and valacyclovir which will be continued on d/c.  patient cleared from neurological standpoint patient stable able to eat and ambulate without difficulty.  ok to d/c

## 2022-02-14 NOTE — CONSULT NOTE ADULT - ATTENDING COMMENTS
Patient examined and has complete left facial paralysis.  He does not endorse change in taste, hearing (other than chronic loss) or pain behind ear.  Suspect Bell's palsy, no other neurologic deficits on exam.  Emphasized importance of eye ointment at night (as opposed to drops) to prevent corneal ulcer.  May use eye drops during the day.  If MRI negative for acute stroke then outpatient neurologic f/u in 4-6 weeks.

## 2022-02-14 NOTE — PROGRESS NOTE ADULT - ATTENDING COMMENTS
patient seen and examined agree above note   follow neurology recommendation   MRI   recall prn   care as per hospitalist and neurology

## 2022-02-14 NOTE — PROGRESS NOTE ADULT - ASSESSMENT
R facial drop likely bells palsy, r/t CVA  CAD s/p CABG    CNS: MR brain, cont w ASA, LIPITOR, tele monitor, has questionable hx of Afib, f/u Neuro recomm, neuro check q4h, might benefit from steroid for bells palsy     HEEN: oral care will d/c with eye drops and eye ointment per neuro  #) likely bells palsy     -will obtain MRI     -will d/c on steroids an valacyclovir.    RS: stable on RA,    CVS: keep I=O, resume BP meds    ID: monitor off abx    NEPHRO: monitor lytes, UO and Cr    GI: feeding DASH diet    ENDO; check A1c, Keep Fs 140-180    DISPO: tele    
IMPRESSION:  R facial drop likely bells palsy, r/o CVA  CAD s/p CABG      SUGGEST:    CNS:  No depressants.  MR brain, cont w ASA,Lipitor.  Neuro FU, neuro checks q4h.      HEENT: oral care    RESPIRATORY:  Target POx > 94%.  HOB at 45 degrees.  Aspiration precautions    CVS:  Keep I = O.  ECHO.  Telemonitoring.  CE x 2.  EKG.    GI:  GI ppx.  Feeding per Speech and swallow.    ID: Monitor off Abx.    RENAL: FU lytes.  Correct as necessary.    ENDO: Insulin protocol if needed.  TSH    Telemonitoring  Recall prn
70 yo male with CAD, s/p CABG 5x, hypertension, chronic back pain, BPH, GERD, gout, and renal cell carcinoma s/p partial left nephrectomy  who presented due to left facial drop.    bell's palsy     - may benefit from prednisone   - await neuro consult   - mri brain if metal from back surgery is mri compatible    - continue home meds   - dvt prophylaxis

## 2022-02-14 NOTE — DISCHARGE NOTE PROVIDER - CARE PROVIDER_API CALL
De Fulton)  Neurology  [address] [place] [address], [address]  [place] [address]  Phone: [phone]  Fax: [phone]  Follow Up Time: 1 week

## 2022-02-14 NOTE — CONSULT NOTE ADULT - ASSESSMENT
ASSESSMENT: Pt is a 71M admitted for left sided facial droop - CVA vs bell's palsy. CTH negative, CTA showing mild areas of stenosis.        PLAN: Case d/w Dr. Fulton  - Bell's palsy vs CVA - most likely left sided Bell's Palsy  - Obtain MR head non-contrast - spinal hardware is not a contraindication  - Start Acyclovir   - Start Prednisone 80mg x 7 days; then taper: 60mg x2d, 40mg x2d, 20mg x2d and 10mg x1d  - GI ppx  - Eyedrops during the day and opthalmic ointment to use at night to prevent ulceration  - IF MRI negative for stroke, pt ok for discharge from a neuro standpoint.   - Pt may follow up with Dr. Fulton in 3-4 weeks.  ASSESSMENT: Pt is a 71M admitted for left sided facial droop - CVA vs bell's palsy. CTH negative, CTA showing mild areas of stenosis.        PLAN: Case d/w Dr. Fulton  - Bell's palsy vs CVA - most likely left sided Bell's Palsy  - Obtain MR head non-contrast - spinal hardware is not a contraindication  - Start Acyclovir or Valcyclovir  - Start Prednisone 80mg x 7 days; then taper: 60mg x2d, 40mg x2d, 20mg x2d and 10mg x1d  - GI ppx  - Eyedrops during the day and opthalmic ointment to use at night to prevent ulceration  - IF MRI negative for stroke, pt ok for discharge from a neuro standpoint.   - Pt may follow up with Dr. Fulton in 3-4 weeks.

## 2022-02-28 DIAGNOSIS — I10 ESSENTIAL (PRIMARY) HYPERTENSION: ICD-10-CM

## 2022-02-28 DIAGNOSIS — M10.9 GOUT, UNSPECIFIED: ICD-10-CM

## 2022-02-28 DIAGNOSIS — K21.9 GASTRO-ESOPHAGEAL REFLUX DISEASE WITHOUT ESOPHAGITIS: ICD-10-CM

## 2022-02-28 DIAGNOSIS — Z95.1 PRESENCE OF AORTOCORONARY BYPASS GRAFT: ICD-10-CM

## 2022-02-28 DIAGNOSIS — R29.810 FACIAL WEAKNESS: ICD-10-CM

## 2022-02-28 DIAGNOSIS — Z85.528 PERSONAL HISTORY OF OTHER MALIGNANT NEOPLASM OF KIDNEY: ICD-10-CM

## 2022-02-28 DIAGNOSIS — I25.10 ATHEROSCLEROTIC HEART DISEASE OF NATIVE CORONARY ARTERY WITHOUT ANGINA PECTORIS: ICD-10-CM

## 2022-02-28 DIAGNOSIS — G89.29 OTHER CHRONIC PAIN: ICD-10-CM

## 2022-02-28 DIAGNOSIS — Z92.22 PERSONAL HISTORY OF MONOCLONAL DRUG THERAPY: ICD-10-CM

## 2022-02-28 DIAGNOSIS — G51.0 BELL'S PALSY: ICD-10-CM

## 2022-02-28 DIAGNOSIS — Z98.1 ARTHRODESIS STATUS: ICD-10-CM

## 2022-02-28 DIAGNOSIS — N40.0 BENIGN PROSTATIC HYPERPLASIA WITHOUT LOWER URINARY TRACT SYMPTOMS: ICD-10-CM

## 2022-02-28 DIAGNOSIS — Z90.5 ACQUIRED ABSENCE OF KIDNEY: ICD-10-CM

## 2022-02-28 DIAGNOSIS — M54.9 DORSALGIA, UNSPECIFIED: ICD-10-CM

## 2022-02-28 DIAGNOSIS — Z88.1 ALLERGY STATUS TO OTHER ANTIBIOTIC AGENTS STATUS: ICD-10-CM

## 2022-03-09 PROBLEM — N40.0 BENIGN PROSTATIC HYPERPLASIA WITHOUT LOWER URINARY TRACT SYMPTOMS: Chronic | Status: INACTIVE | Noted: 2019-02-19 | Resolved: 2022-02-13

## 2022-03-14 NOTE — ED PROVIDER NOTE - EKG #1 DATE/TIME
13-Jul-2020 12:06 Duration Of Freeze Thaw-Cycle (Seconds): 0 Post-Care Instructions: Pt advised to call if not healed with normal skin in 1 month. Render Post-Care Instructions In Note?: no Number Of Freeze-Thaw Cycles: 2 freeze-thaw cycles Show Applicator Variable?: Yes Detail Level: Detailed Consent: The patient's consent was obtained including but not limited to risks of pain, swelling, and crusting.

## 2022-04-07 ENCOUNTER — APPOINTMENT (OUTPATIENT)
Dept: NEUROLOGY | Facility: CLINIC | Age: 72
End: 2022-04-07
Payer: MEDICARE

## 2022-04-07 VITALS
WEIGHT: 243 LBS | OXYGEN SATURATION: 98 % | DIASTOLIC BLOOD PRESSURE: 74 MMHG | HEIGHT: 68 IN | SYSTOLIC BLOOD PRESSURE: 116 MMHG | BODY MASS INDEX: 36.83 KG/M2 | HEART RATE: 79 BPM

## 2022-04-07 DIAGNOSIS — G51.0 BELL'S PALSY: ICD-10-CM

## 2022-04-07 DIAGNOSIS — R42 DIZZINESS AND GIDDINESS: ICD-10-CM

## 2022-04-07 PROCEDURE — 99215 OFFICE O/P EST HI 40 MIN: CPT

## 2022-04-07 NOTE — ASSESSMENT
[FreeTextEntry1] : Patient is 70 yo man with PMHx of CAD s/p CABG x5 vessels, HTN, chronic back pain, BPH, GERD, gout, and renal cell carcinoma s/p partial left nephrectomy who presents as HFU from 2/14/22 for L sided Bell's Palsy that has improved significantly but still has some residual Left lip droop and Left eye lid weakness. He completed eye ointment/drop/Prednisone w/taper/Valtrex. L cortical hyperintensity on DWI has not correspondence on ADC and not stroke, checked with Dr. Matt Busch. He denied any hearing/taste issues. He admitted to some dizziness for a few seconds after getting up from the Right side. \par \par PLAN: \par -OT for Left facial  \par -Vestibular Therapy\par -F/u in clinic PRN

## 2022-04-07 NOTE — HISTORY OF PRESENT ILLNESS
[FreeTextEntry1] : Patient is 72 yo man with PMHx of CAD s/p CABG x5 vessels, HTN, chronic back pain, BPH, GERD, gout, and renal cell carcinoma s/p partial left nephrectomy who presents as HFU from 2/14/22 for L sided Bell's Palsy. \par \par He p/w/c/o of Left facial drop that occurred the night prior and then was still present the morning afterwards. In the ED, CTH/CTA/CTP were negative on stroke work up.  \par \par He was seen by Dr. Fulton and suspected Bell’s palsy, no other neurological deficits on exam.  \par \par LDL 80 andA1c 6.1 inpatient.   \par

## 2022-04-07 NOTE — PHYSICAL EXAM
[FreeTextEntry1] : Focal neurological exam:\par \par MS: Awake, alert, oriented to person, place, situation and time. Normal affect. Follows commands. \par \par Language: Speech is clear, fluent with good repetition & comprehension. No dysarthria. \par \par CNs 2 - 12 intact. EOMI no nystagmus, no diplopia. VFF. Mild Left facial and L eyelid weakness on closure. Hearing grossly normal. Head turning & shoulder shrug intact b/l. Tongue midline, normal movements, no atrophy.\par \par Motor: Normal muscle bulk & tone. No noticeable tremor or seizure. No pronator drift. Muscle strength of b/l UE and b/l LE 5/5. \par \par Reflexes: DTR of biceps, knee and ankle normal \par \par Sensation: Intact to LT, temperature and vibration b/l throughout\par \par Cortical: No extinction\par \par Coordination: No dysmetria to FTN.\par \par Gait: No postural instability. Normal stance and tandem gait.\par \par \par \par \par

## 2022-05-05 ENCOUNTER — APPOINTMENT (OUTPATIENT)
Dept: UROLOGY | Facility: CLINIC | Age: 72
End: 2022-05-05
Payer: MEDICARE

## 2022-05-05 VITALS — HEIGHT: 68 IN | BODY MASS INDEX: 37.28 KG/M2 | WEIGHT: 246 LBS

## 2022-05-05 LAB
BILIRUB UR QL STRIP: NEGATIVE
CLARITY UR: CLEAR
COLLECTION METHOD: NORMAL
GLUCOSE UR-MCNC: NEGATIVE
HCG UR QL: 0.2 EU/DL
HGB UR QL STRIP.AUTO: NEGATIVE
KETONES UR-MCNC: NEGATIVE
LEUKOCYTE ESTERASE UR QL STRIP: NEGATIVE
NITRITE UR QL STRIP: NEGATIVE
PH UR STRIP: 5.5
PROT UR STRIP-MCNC: 100
SP GR UR STRIP: 1.03

## 2022-05-05 PROCEDURE — 99213 OFFICE O/P EST LOW 20 MIN: CPT

## 2022-05-05 PROCEDURE — 81003 URINALYSIS AUTO W/O SCOPE: CPT | Mod: QW

## 2022-06-16 NOTE — ED ADULT NURSE NOTE - NS ED NOTE ABUSE SUSPICION NEGLECT YN
C7-T1 ALCIDES scheduled 07/06/2022 at 10:20 am. Patient will need clearance to hold ASA x 5 days. Staff message sent to Dr. Lopez for clearance. Patient has had Covid vaccinations. Patient declines having any implanted electrical devices (bladder stimulator, pacemaker, defibrillator, glucose monitor, etc). Advised that pre admit would contact patient with time of procedure. Advised patient to contact office if he starts any type of antibiotics or new blood thinners. Voiced understanding.      No

## 2022-11-01 ENCOUNTER — EMERGENCY (EMERGENCY)
Facility: HOSPITAL | Age: 72
LOS: 0 days | Discharge: LEFT BEFORE DISCHARGE | End: 2022-11-01
Attending: EMERGENCY MEDICINE | Admitting: EMERGENCY MEDICINE

## 2022-11-01 VITALS
TEMPERATURE: 98 F | HEART RATE: 80 BPM | DIASTOLIC BLOOD PRESSURE: 84 MMHG | OXYGEN SATURATION: 99 % | SYSTOLIC BLOOD PRESSURE: 161 MMHG | RESPIRATION RATE: 18 BRPM

## 2022-11-01 VITALS
DIASTOLIC BLOOD PRESSURE: 90 MMHG | SYSTOLIC BLOOD PRESSURE: 189 MMHG | TEMPERATURE: 99 F | RESPIRATION RATE: 18 BRPM | OXYGEN SATURATION: 99 % | HEART RATE: 72 BPM

## 2022-11-01 DIAGNOSIS — I25.10 ATHEROSCLEROTIC HEART DISEASE OF NATIVE CORONARY ARTERY WITHOUT ANGINA PECTORIS: ICD-10-CM

## 2022-11-01 DIAGNOSIS — Z79.82 LONG TERM (CURRENT) USE OF ASPIRIN: ICD-10-CM

## 2022-11-01 DIAGNOSIS — Z20.822 CONTACT WITH AND (SUSPECTED) EXPOSURE TO COVID-19: ICD-10-CM

## 2022-11-01 DIAGNOSIS — E78.5 HYPERLIPIDEMIA, UNSPECIFIED: ICD-10-CM

## 2022-11-01 DIAGNOSIS — R07.0 PAIN IN THROAT: ICD-10-CM

## 2022-11-01 DIAGNOSIS — Z98.890 OTHER SPECIFIED POSTPROCEDURAL STATES: Chronic | ICD-10-CM

## 2022-11-01 DIAGNOSIS — Z90.5 ACQUIRED ABSENCE OF KIDNEY: Chronic | ICD-10-CM

## 2022-11-01 DIAGNOSIS — R07.89 OTHER CHEST PAIN: ICD-10-CM

## 2022-11-01 DIAGNOSIS — N40.0 BENIGN PROSTATIC HYPERPLASIA WITHOUT LOWER URINARY TRACT SYMPTOMS: ICD-10-CM

## 2022-11-01 DIAGNOSIS — Z88.8 ALLERGY STATUS TO OTHER DRUGS, MEDICAMENTS AND BIOLOGICAL SUBSTANCES STATUS: ICD-10-CM

## 2022-11-01 DIAGNOSIS — Z53.29 PROCEDURE AND TREATMENT NOT CARRIED OUT BECAUSE OF PATIENT'S DECISION FOR OTHER REASONS: ICD-10-CM

## 2022-11-01 DIAGNOSIS — Z85.528 PERSONAL HISTORY OF OTHER MALIGNANT NEOPLASM OF KIDNEY: ICD-10-CM

## 2022-11-01 DIAGNOSIS — Z91.09 OTHER ALLERGY STATUS, OTHER THAN TO DRUGS AND BIOLOGICAL SUBSTANCES: ICD-10-CM

## 2022-11-01 DIAGNOSIS — Z95.1 PRESENCE OF AORTOCORONARY BYPASS GRAFT: ICD-10-CM

## 2022-11-01 DIAGNOSIS — Z88.1 ALLERGY STATUS TO OTHER ANTIBIOTIC AGENTS STATUS: ICD-10-CM

## 2022-11-01 DIAGNOSIS — M54.2 CERVICALGIA: ICD-10-CM

## 2022-11-01 DIAGNOSIS — Z90.6 ACQUIRED ABSENCE OF OTHER PARTS OF URINARY TRACT: ICD-10-CM

## 2022-11-01 DIAGNOSIS — I10 ESSENTIAL (PRIMARY) HYPERTENSION: ICD-10-CM

## 2022-11-01 DIAGNOSIS — Z87.891 PERSONAL HISTORY OF NICOTINE DEPENDENCE: ICD-10-CM

## 2022-11-01 DIAGNOSIS — D41.4 NEOPLASM OF UNCERTAIN BEHAVIOR OF BLADDER: Chronic | ICD-10-CM

## 2022-11-01 DIAGNOSIS — Z02.9 ENCOUNTER FOR ADMINISTRATIVE EXAMINATIONS, UNSPECIFIED: ICD-10-CM

## 2022-11-01 DIAGNOSIS — M10.9 GOUT, UNSPECIFIED: ICD-10-CM

## 2022-11-01 DIAGNOSIS — D11.9 BENIGN NEOPLASM OF MAJOR SALIVARY GLAND, UNSPECIFIED: Chronic | ICD-10-CM

## 2022-11-01 DIAGNOSIS — Z98.1 ARTHRODESIS STATUS: Chronic | ICD-10-CM

## 2022-11-01 LAB
ALBUMIN SERPL ELPH-MCNC: 4.4 G/DL — SIGNIFICANT CHANGE UP (ref 3.5–5.2)
ALP SERPL-CCNC: 78 U/L — SIGNIFICANT CHANGE UP (ref 30–115)
ALT FLD-CCNC: 23 U/L — SIGNIFICANT CHANGE UP (ref 0–41)
ANION GAP SERPL CALC-SCNC: 14 MMOL/L — SIGNIFICANT CHANGE UP (ref 7–14)
AST SERPL-CCNC: 93 U/L — HIGH (ref 0–41)
BASE EXCESS BLDV CALC-SCNC: 3.3 MMOL/L — HIGH (ref -2–3)
BASOPHILS # BLD AUTO: 0.01 K/UL — SIGNIFICANT CHANGE UP (ref 0–0.2)
BASOPHILS NFR BLD AUTO: 0.1 % — SIGNIFICANT CHANGE UP (ref 0–1)
BILIRUB SERPL-MCNC: 1.5 MG/DL — HIGH (ref 0.2–1.2)
BUN SERPL-MCNC: 14 MG/DL — SIGNIFICANT CHANGE UP (ref 10–20)
CA-I SERPL-SCNC: 1.15 MMOL/L — SIGNIFICANT CHANGE UP (ref 1.15–1.33)
CALCIUM SERPL-MCNC: 10.1 MG/DL — SIGNIFICANT CHANGE UP (ref 8.4–10.4)
CHLORIDE SERPL-SCNC: 100 MMOL/L — SIGNIFICANT CHANGE UP (ref 98–110)
CO2 SERPL-SCNC: 26 MMOL/L — SIGNIFICANT CHANGE UP (ref 17–32)
CREAT SERPL-MCNC: 0.9 MG/DL — SIGNIFICANT CHANGE UP (ref 0.7–1.5)
EGFR: 91 ML/MIN/1.73M2 — SIGNIFICANT CHANGE UP
EOSINOPHIL # BLD AUTO: 0.09 K/UL — SIGNIFICANT CHANGE UP (ref 0–0.7)
EOSINOPHIL NFR BLD AUTO: 1 % — SIGNIFICANT CHANGE UP (ref 0–8)
GAS PNL BLDV: 134 MMOL/L — LOW (ref 136–145)
GAS PNL BLDV: SIGNIFICANT CHANGE UP
GLUCOSE SERPL-MCNC: 89 MG/DL — SIGNIFICANT CHANGE UP (ref 70–99)
HCO3 BLDV-SCNC: 29 MMOL/L — SIGNIFICANT CHANGE UP (ref 22–29)
HCT VFR BLD CALC: 53 % — HIGH (ref 42–52)
HCT VFR BLDA CALC: 51 % — SIGNIFICANT CHANGE UP (ref 39–51)
HGB BLD CALC-MCNC: 16.9 G/DL — SIGNIFICANT CHANGE UP (ref 12.6–17.4)
HGB BLD-MCNC: 17.1 G/DL — SIGNIFICANT CHANGE UP (ref 14–18)
IMM GRANULOCYTES NFR BLD AUTO: 0.2 % — SIGNIFICANT CHANGE UP (ref 0.1–0.3)
LACTATE BLDV-MCNC: 1.7 MMOL/L — SIGNIFICANT CHANGE UP (ref 0.5–2)
LYMPHOCYTES # BLD AUTO: 1.34 K/UL — SIGNIFICANT CHANGE UP (ref 1.2–3.4)
LYMPHOCYTES # BLD AUTO: 15.5 % — LOW (ref 20.5–51.1)
MAGNESIUM SERPL-MCNC: 1.5 MG/DL — LOW (ref 1.8–2.4)
MCHC RBC-ENTMCNC: 30.8 PG — SIGNIFICANT CHANGE UP (ref 27–31)
MCHC RBC-ENTMCNC: 32.3 G/DL — SIGNIFICANT CHANGE UP (ref 32–37)
MCV RBC AUTO: 95.3 FL — HIGH (ref 80–94)
MONOCYTES # BLD AUTO: 0.68 K/UL — HIGH (ref 0.1–0.6)
MONOCYTES NFR BLD AUTO: 7.8 % — SIGNIFICANT CHANGE UP (ref 1.7–9.3)
NEUTROPHILS # BLD AUTO: 6.53 K/UL — HIGH (ref 1.4–6.5)
NEUTROPHILS NFR BLD AUTO: 75.4 % — HIGH (ref 42.2–75.2)
NRBC # BLD: 0 /100 WBCS — SIGNIFICANT CHANGE UP (ref 0–0)
NT-PROBNP SERPL-SCNC: 240 PG/ML — SIGNIFICANT CHANGE UP (ref 0–300)
PCO2 BLDV: 47 MMHG — SIGNIFICANT CHANGE UP (ref 42–55)
PH BLDV: 7.4 — SIGNIFICANT CHANGE UP (ref 7.32–7.43)
PLATELET # BLD AUTO: 213 K/UL — SIGNIFICANT CHANGE UP (ref 130–400)
PO2 BLDV: 27 MMHG — SIGNIFICANT CHANGE UP
POTASSIUM BLDV-SCNC: 4.2 MMOL/L — SIGNIFICANT CHANGE UP (ref 3.5–5.1)
POTASSIUM SERPL-MCNC: 6.3 MMOL/L — CRITICAL HIGH (ref 3.5–5)
POTASSIUM SERPL-SCNC: 6.3 MMOL/L — CRITICAL HIGH (ref 3.5–5)
PROT SERPL-MCNC: 7.6 G/DL — SIGNIFICANT CHANGE UP (ref 6–8)
RBC # BLD: 5.56 M/UL — SIGNIFICANT CHANGE UP (ref 4.7–6.1)
RBC # FLD: 14.4 % — SIGNIFICANT CHANGE UP (ref 11.5–14.5)
SAO2 % BLDV: 40.6 % — SIGNIFICANT CHANGE UP
SARS-COV-2 RNA SPEC QL NAA+PROBE: SIGNIFICANT CHANGE UP
SODIUM SERPL-SCNC: 140 MMOL/L — SIGNIFICANT CHANGE UP (ref 135–146)
TROPONIN T SERPL-MCNC: <0.01 NG/ML — SIGNIFICANT CHANGE UP
WBC # BLD: 8.67 K/UL — SIGNIFICANT CHANGE UP (ref 4.8–10.8)
WBC # FLD AUTO: 8.67 K/UL — SIGNIFICANT CHANGE UP (ref 4.8–10.8)

## 2022-11-01 PROCEDURE — 99285 EMERGENCY DEPT VISIT HI MDM: CPT | Mod: FS,CS

## 2022-11-01 PROCEDURE — 71045 X-RAY EXAM CHEST 1 VIEW: CPT | Mod: 26

## 2022-11-01 PROCEDURE — 93010 ELECTROCARDIOGRAM REPORT: CPT | Mod: 77

## 2022-11-01 PROCEDURE — 93010 ELECTROCARDIOGRAM REPORT: CPT

## 2022-11-01 RX ORDER — MAGNESIUM SULFATE 500 MG/ML
2 VIAL (ML) INJECTION ONCE
Refills: 0 | Status: COMPLETED | OUTPATIENT
Start: 2022-11-01 | End: 2022-11-01

## 2022-11-01 RX ADMIN — Medication 25 GRAM(S): at 18:44

## 2022-11-01 NOTE — ED PROVIDER NOTE - NS ED ROS FT
Constitutional: Negative for fever, chills, and fatigue.  HENT: Negative for headache  Eyes: Negative for eye pain, and vision change.  Cardiovascular: + chest pain. Negative for palpitation.  Respiratory: Negative for SOB, wheezing, cough and sputum production.  Gastrointestinal: Negative for nausea, vomiting, abdominal pain,  Genitourinary: Negative for flank pain, dysuria, frequency, and hematuria.  Neurological: Negative for dizziness, syncope, and loss of consciousness.  Musculoskeletal: Negative for joint swelling, arthralgias, back pain, neck pain, and calf cramps.  Hematological: Does not bruise/bleed easily.

## 2022-11-01 NOTE — ED PROVIDER NOTE - ATTENDING APP SHARED VISIT CONTRIBUTION OF CARE
70 y/o male with h/o cad s/p 5V CABG 7/2020, L RCC s/p resection, htn, hld, in ER with c/o CP.  Pt states last night developed L sided chest pressure and some tightness in his throat.  CP lasted ~ 30 minutes and then resolved, still having some throat tightness.  denies any SOB.  no n/v.  no diaphoresis.  no abd pain.  no LE pain/swelling.  no ha/dizziness/syncope.  Follows with Dena evans, last saw him ~ 6 months ago, has appt this month.  PE - nad, nc/at, eomi, perrl, op - clear, mmm, cta b/l, no w/r/r, rrr, abd- soft, nt/nd, nabs, from x 4, no LE swelling/tenderness, A&O x 3, no focal neuro deficits.  -cardiac w/u.

## 2022-11-01 NOTE — ED PROVIDER NOTE - NSFOLLOWUPINSTRUCTIONS_ED_ALL_ED_FT
You have requested to leave the ED against medical advice. I believe you are of sound mind and competent to refuse medical care. You have been advised of the risks of leaving AMA which include but are not limited to death, coma, transient or permanent disability, delay in diagnosis, need for repeat emergency department, or other complications related to your specific problem today. You have been advised that should you change your mind, you are totally welcome and encouraged to return to the emergency department at any time. In no way does an AMA discharge mean that we do not want you to have the best medical care available (ie. Referrals or prescriptions, as necessary).    Please make sure to follow up with Dr. Gonzalez immediately.

## 2022-11-01 NOTE — ED PROVIDER NOTE - PROGRESS NOTE DETAILS
Spoke with Dr. Gonzalez who recommended to keep the pt in the OBS unit for stress test. Pt has been informed of this, but wants to AMA. Risks discussed and still wants to AMA.

## 2022-11-01 NOTE — ED PROVIDER NOTE - PATIENT PORTAL LINK FT
You can access the FollowMyHealth Patient Portal offered by [facility] by registering at the following website: [email or web address]. By joining [employer]’s FollowMyHealth portal, you will also be able to view your health information using other applications (apps) compatible with our system.

## 2022-11-01 NOTE — ED PROVIDER NOTE - NS ED ATTENDING STATEMENT MOD
This was a shared visit with the FREDY. I reviewed and verified the documentation and independently performed the documented:

## 2022-11-01 NOTE — ED ADULT NURSE NOTE - OBJECTIVE STATEMENT
pt with a hx of by pass surgery presents to the ed c/o intermittent chest pain and upper chest pain that radiates to his neck. Pain started yesterday at rest. Pt denies any associated symptoms with chest pain. Skin is warm and dry. B/l Breath sounds are clear. Pt was placed on cardiac monitor.

## 2022-11-01 NOTE — ED PROVIDER NOTE - PHYSICAL EXAMINATION
CONSTITUTIONAL: Well-appearing; in no apparent distress.   HEAD: Normocephalic; atraumatic.   EYES: Pupils are round and reactive, extra-ocular muscles are intact. Eyelids are normal in appearance without swelling or lesions.   ENT: Hearing is intact with good acuity to spoken voice. Patient is speaking clearly, not muffled and airway is intact.   RESPIRATORY: No signs of respiratory distress. Lung sounds are clear in all lobes bilaterally without rales, rhonchi, or wheezes.  CARDIOVASCULAR: Regular rate and rhythm.   GI: Abdomen is soft, non-tender, and without distention.   MS: Normal appearance and ROM in all four extremities. No tenderness to palpation and distal pulses are normal. Sensation to the upper and lower extremities is normal bilaterally.   NEURO: A & O x 3. Normal speech.   PSYCHOLOGICAL: Appropriate mood and affect. Good judgement and insight.

## 2022-11-01 NOTE — ED PROVIDER NOTE - CLINICAL SUMMARY MEDICAL DECISION MAKING FREE TEXT BOX
71-year-old male with history of CAD, in ER with c/o chest pain.  Labs reviewed: CBC unremarkable, CMP with hemolyzed K+ >4.2 on repeat.  Mag 1.5 (repleted).  Troponin/BNP negative EKG with no acute ischemic changes.  CXR negative.  Case discussed with Dr. Fernandes, recommends EDOU admission for stress test, however patient does not want to stay.  Wants to go home and follow-up as an outpatient.  Patient told that his work-up is incomplete and that leaving could result in death or permanent disability, patient understands but still wants to go.  To sign out AMA.  Patient told he can return to ER at any time to continue his evaluation.

## 2022-11-01 NOTE — ED PROVIDER NOTE - OBJECTIVE STATEMENT
71-year-old male with past medical history of CAD with CABG, hypertension, chronic back pain, BPH, gout, and renal cell carcinoma status post nephrectomy on the left side who presents with chest pain.  Reports that pain occurred last night around 9:00 PM while he was watching TV; pain is in the left side of the chest, pressure in sensation, intermittent, nonexertional, radiating to his neck area, and there is no alleviating or worsening factor; reports that heaviness only lasted for 30 minutes and subsided on its own.  Reports that he does have a chest pain right now, but still feels heaviness in his neck area.  Patient is a former smoker and denies family history of cardiac problems.  Denies recent traveling, immobilization, surgery, hospitalization, and history of blood clot/cancer.  Reports that he was seen by Dr. Gonzalez cardiologist last year and has an appointment with him later this month.  Denies fever, shortness of breath, nausea, vomiting, abdominal pain, urinary symptoms, and change in bowel movement.

## 2022-11-04 ENCOUNTER — EMERGENCY (EMERGENCY)
Facility: HOSPITAL | Age: 72
LOS: 0 days | Discharge: HOME | End: 2022-11-04
Admitting: EMERGENCY MEDICINE

## 2022-11-04 ENCOUNTER — INPATIENT (INPATIENT)
Facility: HOSPITAL | Age: 72
LOS: 0 days | Discharge: HOME | End: 2022-11-05
Attending: INTERNAL MEDICINE | Admitting: INTERNAL MEDICINE

## 2022-11-04 VITALS
SYSTOLIC BLOOD PRESSURE: 219 MMHG | HEART RATE: 79 BPM | WEIGHT: 233.91 LBS | TEMPERATURE: 94 F | OXYGEN SATURATION: 96 % | DIASTOLIC BLOOD PRESSURE: 113 MMHG | RESPIRATION RATE: 20 BRPM

## 2022-11-04 DIAGNOSIS — D11.9 BENIGN NEOPLASM OF MAJOR SALIVARY GLAND, UNSPECIFIED: Chronic | ICD-10-CM

## 2022-11-04 DIAGNOSIS — Z95.1 PRESENCE OF AORTOCORONARY BYPASS GRAFT: Chronic | ICD-10-CM

## 2022-11-04 DIAGNOSIS — Z98.890 OTHER SPECIFIED POSTPROCEDURAL STATES: Chronic | ICD-10-CM

## 2022-11-04 DIAGNOSIS — D41.4 NEOPLASM OF UNCERTAIN BEHAVIOR OF BLADDER: Chronic | ICD-10-CM

## 2022-11-04 DIAGNOSIS — Z98.1 ARTHRODESIS STATUS: Chronic | ICD-10-CM

## 2022-11-04 DIAGNOSIS — Z90.5 ACQUIRED ABSENCE OF KIDNEY: Chronic | ICD-10-CM

## 2022-11-04 LAB
ALBUMIN SERPL ELPH-MCNC: 4.2 G/DL — SIGNIFICANT CHANGE UP (ref 3.5–5.2)
ALP SERPL-CCNC: 75 U/L — SIGNIFICANT CHANGE UP (ref 30–115)
ALT FLD-CCNC: 17 U/L — SIGNIFICANT CHANGE UP (ref 0–41)
ANION GAP SERPL CALC-SCNC: 12 MMOL/L — SIGNIFICANT CHANGE UP (ref 7–14)
AST SERPL-CCNC: 28 U/L — SIGNIFICANT CHANGE UP (ref 0–41)
BASOPHILS # BLD AUTO: 0.02 K/UL — SIGNIFICANT CHANGE UP (ref 0–0.2)
BASOPHILS NFR BLD AUTO: 0.3 % — SIGNIFICANT CHANGE UP (ref 0–1)
BILIRUB SERPL-MCNC: 1.2 MG/DL — SIGNIFICANT CHANGE UP (ref 0.2–1.2)
BUN SERPL-MCNC: 14 MG/DL — SIGNIFICANT CHANGE UP (ref 10–20)
CALCIUM SERPL-MCNC: 9.7 MG/DL — SIGNIFICANT CHANGE UP (ref 8.4–10.5)
CHLORIDE SERPL-SCNC: 103 MMOL/L — SIGNIFICANT CHANGE UP (ref 98–110)
CK MB CFR SERPL CALC: 3.9 NG/ML — SIGNIFICANT CHANGE UP (ref 0.6–6.3)
CK MB CFR SERPL CALC: 4.4 NG/ML — SIGNIFICANT CHANGE UP (ref 0.6–6.3)
CO2 SERPL-SCNC: 27 MMOL/L — SIGNIFICANT CHANGE UP (ref 17–32)
CREAT SERPL-MCNC: 0.9 MG/DL — SIGNIFICANT CHANGE UP (ref 0.7–1.5)
EGFR: 91 ML/MIN/1.73M2 — SIGNIFICANT CHANGE UP
EOSINOPHIL # BLD AUTO: 0.11 K/UL — SIGNIFICANT CHANGE UP (ref 0–0.7)
EOSINOPHIL NFR BLD AUTO: 1.6 % — SIGNIFICANT CHANGE UP (ref 0–8)
GLUCOSE SERPL-MCNC: 136 MG/DL — HIGH (ref 70–99)
HCT VFR BLD CALC: 48 % — SIGNIFICANT CHANGE UP (ref 42–52)
HGB BLD-MCNC: 15.8 G/DL — SIGNIFICANT CHANGE UP (ref 14–18)
IMM GRANULOCYTES NFR BLD AUTO: 0.4 % — HIGH (ref 0.1–0.3)
LYMPHOCYTES # BLD AUTO: 1.19 K/UL — LOW (ref 1.2–3.4)
LYMPHOCYTES # BLD AUTO: 17.1 % — LOW (ref 20.5–51.1)
MCHC RBC-ENTMCNC: 30.8 PG — SIGNIFICANT CHANGE UP (ref 27–31)
MCHC RBC-ENTMCNC: 32.9 G/DL — SIGNIFICANT CHANGE UP (ref 32–37)
MCV RBC AUTO: 93.6 FL — SIGNIFICANT CHANGE UP (ref 80–94)
MONOCYTES # BLD AUTO: 0.58 K/UL — SIGNIFICANT CHANGE UP (ref 0.1–0.6)
MONOCYTES NFR BLD AUTO: 8.3 % — SIGNIFICANT CHANGE UP (ref 1.7–9.3)
NEUTROPHILS # BLD AUTO: 5.04 K/UL — SIGNIFICANT CHANGE UP (ref 1.4–6.5)
NEUTROPHILS NFR BLD AUTO: 72.3 % — SIGNIFICANT CHANGE UP (ref 42.2–75.2)
NRBC # BLD: 0 /100 WBCS — SIGNIFICANT CHANGE UP (ref 0–0)
NT-PROBNP SERPL-SCNC: 218 PG/ML — SIGNIFICANT CHANGE UP (ref 0–300)
PLATELET # BLD AUTO: 200 K/UL — SIGNIFICANT CHANGE UP (ref 130–400)
POTASSIUM SERPL-MCNC: 4.3 MMOL/L — SIGNIFICANT CHANGE UP (ref 3.5–5)
POTASSIUM SERPL-SCNC: 4.3 MMOL/L — SIGNIFICANT CHANGE UP (ref 3.5–5)
PROT SERPL-MCNC: 6.6 G/DL — SIGNIFICANT CHANGE UP (ref 6–8)
RBC # BLD: 5.13 M/UL — SIGNIFICANT CHANGE UP (ref 4.7–6.1)
RBC # FLD: 14.3 % — SIGNIFICANT CHANGE UP (ref 11.5–14.5)
SARS-COV-2 RNA SPEC QL NAA+PROBE: SIGNIFICANT CHANGE UP
SODIUM SERPL-SCNC: 142 MMOL/L — SIGNIFICANT CHANGE UP (ref 135–146)
TROPONIN T SERPL-MCNC: <0.01 NG/ML — SIGNIFICANT CHANGE UP
WBC # BLD: 6.97 K/UL — SIGNIFICANT CHANGE UP (ref 4.8–10.8)
WBC # FLD AUTO: 6.97 K/UL — SIGNIFICANT CHANGE UP (ref 4.8–10.8)

## 2022-11-04 PROCEDURE — 99285 EMERGENCY DEPT VISIT HI MDM: CPT | Mod: CS

## 2022-11-04 PROCEDURE — 99221 1ST HOSP IP/OBS SF/LOW 40: CPT | Mod: FS,AI

## 2022-11-04 PROCEDURE — 71046 X-RAY EXAM CHEST 2 VIEWS: CPT | Mod: 26

## 2022-11-04 PROCEDURE — 93010 ELECTROCARDIOGRAM REPORT: CPT

## 2022-11-04 PROCEDURE — 99223 1ST HOSP IP/OBS HIGH 75: CPT

## 2022-11-04 RX ORDER — ADENOSINE 3 MG/ML
60 INJECTION INTRAVENOUS ONCE
Refills: 0 | Status: DISCONTINUED | OUTPATIENT
Start: 2022-11-04 | End: 2022-11-05

## 2022-11-04 RX ORDER — ASPIRIN/CALCIUM CARB/MAGNESIUM 324 MG
243 TABLET ORAL ONCE
Refills: 0 | Status: COMPLETED | OUTPATIENT
Start: 2022-11-04 | End: 2022-11-04

## 2022-11-04 RX ORDER — CETIRIZINE HYDROCHLORIDE 10 MG/1
1 TABLET ORAL
Qty: 0 | Refills: 0 | DISCHARGE

## 2022-11-04 RX ORDER — FAMOTIDINE 10 MG/ML
20 INJECTION INTRAVENOUS DAILY
Refills: 0 | Status: DISCONTINUED | OUTPATIENT
Start: 2022-11-05 | End: 2022-11-05

## 2022-11-04 RX ORDER — HEPARIN SODIUM 5000 [USP'U]/ML
5000 INJECTION INTRAVENOUS; SUBCUTANEOUS EVERY 8 HOURS
Refills: 0 | Status: DISCONTINUED | OUTPATIENT
Start: 2022-11-04 | End: 2022-11-05

## 2022-11-04 RX ORDER — METOPROLOL TARTRATE 50 MG
100 TABLET ORAL EVERY 12 HOURS
Refills: 0 | Status: DISCONTINUED | OUTPATIENT
Start: 2022-11-04 | End: 2022-11-05

## 2022-11-04 RX ORDER — ATORVASTATIN CALCIUM 80 MG/1
40 TABLET, FILM COATED ORAL AT BEDTIME
Refills: 0 | Status: DISCONTINUED | OUTPATIENT
Start: 2022-11-04 | End: 2022-11-05

## 2022-11-04 RX ORDER — ALLOPURINOL 300 MG
300 TABLET ORAL DAILY
Refills: 0 | Status: DISCONTINUED | OUTPATIENT
Start: 2022-11-05 | End: 2022-11-05

## 2022-11-04 RX ORDER — ASPIRIN/CALCIUM CARB/MAGNESIUM 324 MG
81 TABLET ORAL DAILY
Refills: 0 | Status: DISCONTINUED | OUTPATIENT
Start: 2022-11-05 | End: 2022-11-05

## 2022-11-04 RX ORDER — VALSARTAN 80 MG/1
320 TABLET ORAL DAILY
Refills: 0 | Status: DISCONTINUED | OUTPATIENT
Start: 2022-11-05 | End: 2022-11-05

## 2022-11-04 RX ADMIN — HEPARIN SODIUM 5000 UNIT(S): 5000 INJECTION INTRAVENOUS; SUBCUTANEOUS at 21:55

## 2022-11-04 RX ADMIN — Medication 243 MILLIGRAM(S): at 10:19

## 2022-11-04 RX ADMIN — ATORVASTATIN CALCIUM 40 MILLIGRAM(S): 80 TABLET, FILM COATED ORAL at 21:55

## 2022-11-04 RX ADMIN — HEPARIN SODIUM 5000 UNIT(S): 5000 INJECTION INTRAVENOUS; SUBCUTANEOUS at 13:19

## 2022-11-04 NOTE — H&P ADULT - ASSESSMENT
72 yr M w/PMHX: CAD/s/p CABG  (last in 7/15/2020 Dr. Key, 5 vessels),  HTN, HLD, chronic back pain, BPH, GERD, gout, and renal cell carcinoma s/p partial left nephrectomy - recently seen in [facility] for CP on 11/1/22 - ED Spoke with Dr. Gonzalez who recommended to keep the pt in the OBS unit for stress test. but opted to AMA. Pt resolved thereafter    He returns to ER today for chest pain uopn awakening - explained as heavy, pressure sensation in lower neck/upper sternal area - nonradiating, associated with nausea (resolved); denies HA, dizziness, visual changes, cough, SOB, N/V, diaphoresis, leg pain, leg swelling, hx of blood clots.  His BP at that time was 165/100 (normal is 120/80's).  Pt still with symptoms of pressure, rates as 5/10.    1. R/O ACS   - admit to soft tele   - F/U cardio; F/U stress test   - trend cardiac enzymes  - f/u am labs    2. CAD/CABG; HTN   - cont ASA 81, metoprolol, valsartan    3. HLD  - cont atorvastatin    4. GOUT   - cont allopurinol    5. GERD   - pt was taking prilosec up until yesterday (stopped it bc it could cause low magnesium)  and started pepcid yesterday; will continue pepcid    5. VTE prophylaxis   - Sq heparin

## 2022-11-04 NOTE — H&P ADULT - HISTORY OF PRESENT ILLNESS
72 yr M w/PMHX: CAD/s/p CABG  (last in 7/15/2020 Dr. Key, 5 vessels),  HTN, chronic back pain, BPH, GERD, gout, and renal cell carcinoma s/p partial left nephrectomy - recently seen in [facility] for CP on 11/1/22 - ED Spoke with Dr. Gonzalez who recommended to keep the pt in the OBS unit for stress test. but opted to AMA. Pt resolved thereafter    He returns to ER today for chest pain uopn awakening - explained as heavy, pressure sensation in lower neck/upper sternal area - nonradiating, associated with nausea (resolved); denies HA, dizziness, visual changes, cough, SOB, N/V, diaphoresis, leg pain, leg swelling, hx of blood clots.  His BP at that time was 165/100 (normal is 120/80's).  Pt still with symptoms of pressure, rates as 5/10.      In ER, received  mg    HX COVID 12/2020, s/p monoclonal AB    Cardio: Dr. Fernandes 72 yr M w/PMHX: CAD/s/p CABG  (last in 7/15/2020 Dr. Key, 5 vessels),  HTN, HLD, chronic back pain, BPH, GERD, gout, and renal cell carcinoma s/p partial left nephrectomy - recently seen in [facility] for CP on 11/1/22 - ED Spoke with Dr. Gonzalez who recommended to keep the pt in the OBS unit for stress test. but opted to AMA. Pt resolved thereafter    He returns to ER today for chest pain uopn awakening - explained as heavy, pressure sensation in lower neck/upper sternal area - nonradiating, associated with nausea (resolved); denies HA, dizziness, visual changes, cough, SOB, N/V, diaphoresis, leg pain, leg swelling, hx of blood clots.  His BP at that time was 165/100 (normal is 120/80's).  Pt still with symptoms of pressure, rates as 5/10.      In ER, received  mg    HX COVID 12/2020, s/p monoclonal AB    Cardio: Dr. Fernandes

## 2022-11-04 NOTE — ED PROVIDER NOTE - PHYSICAL EXAMINATION
GENERAL: NAD   SKIN: warm, dry  HEAD: Normocephalic; atraumatic.  EYES: PERRLA, EOMI  CARD: S1, S2 normal; no murmurs, gallops, or rubs. Regular rate and rhythm.   RESP: LCTAB; No wheezes, rales, rhonchi, or stridor.  ABD: soft, nontender, and nondistended  EXT:  No LE TTP or edema bilaterally.  NEURO: Alert, oriented, grossly unremarkable  PSYCH: Cooperative, appropriate.

## 2022-11-04 NOTE — CONSULT NOTE ADULT - SUBJECTIVE AND OBJECTIVE BOX
HPI:  HPI-Cardiology   Pt evaluated at bedside. Pt states that he came in because of high blood pressure when he measured it at home. Also endorses "pressure around the anterior neck area", and mild midsternal "chest tightness". Denies any aggravating or alleviating factors. Endorses that he was at the [facility] with similar symptoms on 11/1 and states that everything was and I was discharged". Pt currently still endorses "some pressure around the anterior neck area", but denies any SOB, palpitations, dizziness, diaphoresis, leg edema or nausea/vomiting. Radiology tests and hospital records, were reviewed, as well as previous notes on this patient.       PAST MEDICAL & SURGICAL HISTORY  Renal cancer, left  Renal cell carcinoma s/p partial left nephrectomy (20% removed)    Gout    HTN (hypertension)    Back pain    GERD (gastroesophageal reflux disease)    H/O partial nephrectomy  9/17/2009 by Dr. Vergara    H/O cystoscopy  TURP 7/27/2005 by Dr. Vanessa Alonso&#x27;s tumor  Removed 2/3/2006 by Dr. Prieto    H/O laminectomy  L3/L4 9/11/2002 by Dr. Jara    H/O spinal fusion  L4/5-L5/S1 6/23/2008 by Dr. Jara with revision of fusion Transome Axial JF 3/1/2020 by Dr. Jara    Bladder polyp  S/p removal 4/4/2019 by Dr. Wylie        FAMILY HISTORY:  FAMILY HISTORY:  unknown    SOCIAL HISTORY:  []smoker-former quit over 20 years ago  []Alcohol-denies  []Drug-denies      ALLERGIES:  adhesives (Rash)  Keflex (Diarrhea)  methylPREDNISolone (Pruritus)  rash (Rash)      MEDICATIONS:  MEDICATIONS  (STANDING):    MEDICATIONS  (PRN):      HOME MEDICATIONS:  Home Medications:  aspirin 81 mg oral tablet, chewable: 1 tab(s) orally once a day (04 Nov 2022 10:30)  PriLOSEC OTC 20 mg oral delayed release tablet: 1 tab(s) orally Tuesday, Thursday, Saturday, Sunday (04 Nov 2022 10:30)  valsartan 320 mg oral tablet: 1 tab(s) orally once a day (04 Nov 2022 10:30)      VITALS:   T(F): 98.7 (11-04 @ 10:35), Max: 98.9 (11-01 @ 13:33)  HR: 77 (11-04 @ 10:40) (72 - 80)  BP: 126/82 (11-04 @ 10:58) (126/82 - 219/113)  BP(mean): --  RR: 18 (11-04 @ 10:40) (18 - 20)  SpO2: 100% (11-04 @ 10:40) (96% - 100%)          REVIEW OF SYSTEMS:  See HPI        PHYSICAL EXAM:  NEURO: patient is awake , alert and oriented  GEN: Not in acute distress  NECK: no thyroid enlargement, no JVD  LUNGS: Clear to auscultation bilaterally   CARDIOVASCULAR: S1/S2 present, RRR , no murmurs or rubs, no carotid bruits,  + PP bilaterally  ABD: Soft, non-tender, non-distended, +BS  EXT: No ZIYAD  SKIN: Intact        LABS:                        15.8   6.97  )-----------( 200      ( 04 Nov 2022 10:00 )             48.0     11-04    142  |  103  |  14  ----------------------------<  136<H>  4.3   |  27  |  0.9    Ca    9.7      04 Nov 2022 10:00    TPro  6.6  /  Alb  4.2  /  TBili  1.2  /  DBili  x   /  AST  28  /  ALT  17  /  AlkPhos  75  11-04      Troponin T, Serum: <0.01 ng/mL (11-04-22 @ 10:00)    CARDIAC MARKERS ( 04 Nov 2022 10:00 )  x     / <0.01 ng/mL / x     / x     / x          Serum Pro-Brain Natriuretic Peptide: 218 pg/mL (11-04-22 @ 10:00)          RADIOLOGY:  -CXR:  < from: Xray Chest 1 View- PORTABLE-Urgent (11.01.22 @ 16:32) >  Impression:    No radiographic evidence of acute cardiopulmonary disease.        --- End of Report ---      < end of copied text >            < from: Transthoracic Echocardiogram (07.14.20 @ 14:53) >    Summary:   1. Normal global left ventricular systolic function.   2. LV Ejection Fraction by Sousa's Method with a biplane EF of 59 %.   3. Mild asymmetric left ventricular hypertrophy involving the septal wall.   4. Mildly increased LV wall thickness.   5. Mild late systolic dynamic gradient c/w LVOT obstruction of 13 mm. Hg.   6. Mild anterior leaflet systolic anterior motion of the mitral valve.   7. LA volume Index is 11.2 ml/m² ml/m2.    < end of copied text >    ECG:  < from: 12 Lead ECG (11.04.22 @ 09:50) >  Normal sinus rhythm  Left axis deviation  Possible Inferior infarct , age undetermined  Abnormal ECG    Confirmed by LINN KRAMER, HILTON ([id number]) on 11/4/2022 10:07:57 AM    < end of copied text >

## 2022-11-04 NOTE — H&P ADULT - NSICDXPASTSURGICALHX_GEN_ALL_CORE_FT
PAST SURGICAL HISTORY:  Bladder polyp S/p removal 4/4/2019 by Dr. Wylie    H/O cystoscopy TURP 7/27/2005 by Dr. Mendez    H/O laminectomy L3/L4 9/11/2002 by Dr. Jara    H/O partial nephrectomy 9/17/2009 by Dr. Vergara    H/O spinal fusion L4/5-L5/S1 6/23/2008 by Dr. Jara with revision of fusion Transome Axial JF 3/1/2020 by Dr. Jara    S/P CABG x 5 7/15/2020 Dr. 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 Prieto

## 2022-11-04 NOTE — CONSULT NOTE ADULT - ASSESSMENT
72 yr M w/PMHX: CAD/s/p CABG  (last in 7/15/2020 Dr. Key, 5 vessels),  HTN, HLD, chronic back pain, BPH, GERD, gout, and renal cell carcinoma s/p partial left nephrectomy - recently seen in [facility] for CP on 11/1/22 -returns to ER today for chest pain upon awakening      Impression:  #chest pain: r/o ACS  #HTN/HLD    Pt currently still endorses some "CP"  Troponin flat x2  ECG: NS, no acute changes  ECHO(2020): EF 59%, Normal global LVSF  Cxr: Unremarkable  pBNP 218    Plan:  Trend trop x1  CKMB, CK, Lipid profile, TSH, A1C  Repeat ECG in am  Recommend inpatient adenosine thallium stress test  Obtain TTE  Cont ASA, Lipitor, Metoprolol, Valsartan   Monitor lytes    Discussed with Dr Pace

## 2022-11-04 NOTE — H&P ADULT - NSHPLABSRESULTS_GEN_ALL_CORE
Vital Signs Last 24 Hrs  T(C): 37.1 (04 Nov 2022 10:35), Max: 37.1 (04 Nov 2022 10:35)  T(F): 98.7 (04 Nov 2022 10:35), Max: 98.7 (04 Nov 2022 10:35)  HR: 77 (04 Nov 2022 10:40) (77 - 79)  BP: 126/82 (04 Nov 2022 10:58) (126/82 - 219/113)  BP(mean): --  RR: 18 (04 Nov 2022 10:40) (18 - 20)  SpO2: 100% (04 Nov 2022 10:40) (96% - 100%)    Parameters below as of 04 Nov 2022 10:40  Patient On (Oxygen Delivery Method): room air    Labs:  CAPILLARY BLOOD GLUCOSE      POCT Blood Glucose.: 131 mg/dL (04 Nov 2022 09:55)                          15.8   6.97  )-----------( 200      ( 04 Nov 2022 10:00 )             48.0       Auto Neutrophil %: 72.3 % (11-04-22 @ 10:00)  Auto Immature Granulocyte %: 0.4 % (11-04-22 @ 10:00)    11-04    142  |  103  |  14  ----------------------------<  136<H>  4.3   |  27  |  0.9      Calcium, Total Serum: 9.7 mg/dL (11-04-22 @ 10:00)      LFTs:             6.6  | 1.2  | 28       ------------------[75      ( 04 Nov 2022 10:00 )  4.2  | x    | 17          Lipase:x      Amylase:x           Blood Gas Venous - Lactate: 1.70 mmol/L (11-01-22 @ 18:36)      Coags:    CARDIAC MARKERS ( 04 Nov 2022 10:00 )  x     / <0.01 ng/mL / x     / x     / x            COVID-19 PCR (11.04.22 @ 10:00)   COVID-19 PCR: NotDetec:     12 Lead ECG (11.04.22 @ 09:50) >    Diagnosis Line   Normal sinus rhythm  Left axis deviation  Possible Inferior infarct , age undetermined  Abnormal ECG    Xray Chest 1 View- PORTABLE-Urgent (11.01.22 @ 16:32) >    Impression:    No radiographic evidence of acute cardiopulmonary disease.

## 2022-11-04 NOTE — ED PROVIDER NOTE - OBJECTIVE STATEMENT
73 yo male w/ PMH of CAD with CABG, hypertension, chronic back pain, BPH, gout, and renal cell carcinoma status post nephrectomy on the left side presents for chest pain x hours and elevated blood pressure.  No inciting event or trauma, no alleviating/provoking factors, retrosternal chest pressure, radiating to the neck, had similar episode 3 days ago, was evaluated at [facility] and was recommended for obs stress test but signed out AMA.  Associated elevated BP, SBP of 160s at home. No fevers, cough, SOB, N/V, diaphoresis, leg pain, leg swelling, hx of blood clots.  Sees Dr. Gonzalez for cardiology.

## 2022-11-04 NOTE — ED ADULT TRIAGE NOTE - CHIEF COMPLAINT QUOTE
"This morning my blood pressure was 160/101. I feel pressure to my neck." Pt was seen at [facility] on Tuesday for hypertension. Pt has history of quadruple bypass two years ago.

## 2022-11-04 NOTE — PATIENT PROFILE ADULT - NSTRANSFERBELONGINGSDISPO_GEN_A_NUR
I have reviewed and agree with the resident's findings and plan as documented in encounter.    Khadra Ruiz MD   not applicable

## 2022-11-04 NOTE — CONSULT NOTE ADULT - NS ATTEND AMEND GEN_ALL_CORE FT
Patient seen and examined. Pertinent labs, imaging and telemetry reviewed. I agree with the above:     Patient still with pressure in his upper chest/neck. Has been ongoing over this past week. Was evaluated at [facility] but left due to increased anxiety. No other symptoms of SOB, palpitations, dizziness.   Patient underwent CABG in 2020 and has been feeling well since. He follows with Dr. Fernandes and was supposed to follow up later this month.   Pt hypertensive in ED with non-ischemic EKG and negative cardiac enzymes.   -Case discussed with Dr. Fernandes and suggested admission for pharm nuclear stress.   -NPO after MN for stress at [facility] tomorrow.   -Check TTE.   -Continue to trend troponins and check lipid, TSH and A1c.   -Continue on current medical therapy.     Discussed with patient, wife and ACP.

## 2022-11-04 NOTE — H&P ADULT - NSICDXFAMILYHX_GEN_ALL_CORE_FT
FAMILY HISTORY:  FH: glaucoma    Father  Still living? Unknown  Family history of CVA, Age at diagnosis: Age Unknown

## 2022-11-04 NOTE — ED PROVIDER NOTE - ATTENDING CONTRIBUTION TO CARE
Patient complains of chest pain rating to the neck.  He denies shortness of breath.  Vital signs noted.  NAD.  Chest clear.  Heart regular rate no murmur.  Sternotomy scar is noted.  Abdomen is nontender.  Extremity equal pulses.  EKG shows no acute changes.  Troponin is negative.  In view of the history of coronary artery disease in the past, this patient will be admitted to a monitored setting.

## 2022-11-04 NOTE — ED ADULT NURSE NOTE - CHPI ED NUR SYMPTOMS POS
Patient c/o [facility] blood pressure this AM of 160/101 and neck pain, was recently admitted to [place] for HTN.

## 2022-11-04 NOTE — ED ADULT NURSE NOTE - CHIEF COMPLAINT QUOTE
"This morning my blood pressure was 160/101. I feel pressure to my neck." Pt was seen at [facility] on Tuesday for hypertension. Pt has history of quadruple bypass two years ago.

## 2022-11-04 NOTE — H&P ADULT - NS ATTEND AMEND GEN_ALL_CORE FT
Patient readmitted for chest pain at rest. Per private cardiologist recommends stress test due to high cardiac risk factors.     # Angina at rest  # CAD s/p CABG  # Uncontrolled HTN  # HLD  # GERD  # BPH  # Chronic gout  # H/o RCC s/p nephrectomy      Admit to 3S for telemetry monitoring  Cycle troponin  Resume home medication  Cardiology consult  Stress test as recommended  Patient education    Discharge planning based on stress test result.

## 2022-11-04 NOTE — ED PROVIDER NOTE - CLINICAL SUMMARY MEDICAL DECISION MAKING FREE TEXT BOX
In my opinion, in patient treatment is medically justifiable and appropriate.  See attending note for documentation of medical decision making.

## 2022-11-04 NOTE — ED PROVIDER NOTE - NS ED ROS FT
Constitutional: No fevers, chills, or malaise.  HEENT: No headache, visual changes   Cardiac: Reports chest pain. No SOB, leg edema, or leg pain.  Respiratory:  No cough, respiratory distress, or hemoptysis.  GI:  No nausea, vomiting, diarrhea, or abdominal pain.  :  No dysuria, frequency, or urgency.  MS:  No myalgia, muscle weakness, joint pain or back pain.  Neuro:  No dizziness, LOC, paralysis, or N/T.  Skin:  No skin rash.   Endocrine: No polyuria, polyphagia, or polydipsia.

## 2022-11-05 ENCOUNTER — TRANSCRIPTION ENCOUNTER (OUTPATIENT)
Age: 72
End: 2022-11-05

## 2022-11-05 VITALS — SYSTOLIC BLOOD PRESSURE: 140 MMHG | HEART RATE: 85 BPM | DIASTOLIC BLOOD PRESSURE: 78 MMHG

## 2022-11-05 LAB
ANION GAP SERPL CALC-SCNC: 15 MMOL/L — HIGH (ref 7–14)
BUN SERPL-MCNC: 10 MG/DL — SIGNIFICANT CHANGE UP (ref 10–20)
CALCIUM SERPL-MCNC: 9.6 MG/DL — SIGNIFICANT CHANGE UP (ref 8.4–10.5)
CHLORIDE SERPL-SCNC: 104 MMOL/L — SIGNIFICANT CHANGE UP (ref 98–110)
CO2 SERPL-SCNC: 23 MMOL/L — SIGNIFICANT CHANGE UP (ref 17–32)
CREAT SERPL-MCNC: 0.8 MG/DL — SIGNIFICANT CHANGE UP (ref 0.7–1.5)
EGFR: 94 ML/MIN/1.73M2 — SIGNIFICANT CHANGE UP
GLUCOSE SERPL-MCNC: 95 MG/DL — SIGNIFICANT CHANGE UP (ref 70–99)
HCT VFR BLD CALC: 51.7 % — SIGNIFICANT CHANGE UP (ref 42–52)
HGB BLD-MCNC: 17.5 G/DL — SIGNIFICANT CHANGE UP (ref 14–18)
MAGNESIUM SERPL-MCNC: 1.4 MG/DL — LOW (ref 1.8–2.4)
MCHC RBC-ENTMCNC: 31.4 PG — HIGH (ref 27–31)
MCHC RBC-ENTMCNC: 33.8 G/DL — SIGNIFICANT CHANGE UP (ref 32–37)
MCV RBC AUTO: 92.7 FL — SIGNIFICANT CHANGE UP (ref 80–94)
NRBC # BLD: 0 /100 WBCS — SIGNIFICANT CHANGE UP (ref 0–0)
PHOSPHATE SERPL-MCNC: 3.7 MG/DL — SIGNIFICANT CHANGE UP (ref 2.1–4.9)
PLATELET # BLD AUTO: 172 K/UL — SIGNIFICANT CHANGE UP (ref 130–400)
POTASSIUM SERPL-MCNC: 4 MMOL/L — SIGNIFICANT CHANGE UP (ref 3.5–5)
POTASSIUM SERPL-SCNC: 4 MMOL/L — SIGNIFICANT CHANGE UP (ref 3.5–5)
RBC # BLD: 5.58 M/UL — SIGNIFICANT CHANGE UP (ref 4.7–6.1)
RBC # FLD: 14.1 % — SIGNIFICANT CHANGE UP (ref 11.5–14.5)
SODIUM SERPL-SCNC: 142 MMOL/L — SIGNIFICANT CHANGE UP (ref 135–146)
WBC # BLD: 6.06 K/UL — SIGNIFICANT CHANGE UP (ref 4.8–10.8)
WBC # FLD AUTO: 6.06 K/UL — SIGNIFICANT CHANGE UP (ref 4.8–10.8)

## 2022-11-05 PROCEDURE — 78452 HT MUSCLE IMAGE SPECT MULT: CPT | Mod: 26

## 2022-11-05 PROCEDURE — 93016 CV STRESS TEST SUPVJ ONLY: CPT

## 2022-11-05 PROCEDURE — 99232 SBSQ HOSP IP/OBS MODERATE 35: CPT

## 2022-11-05 PROCEDURE — 93018 CV STRESS TEST I&R ONLY: CPT

## 2022-11-05 RX ADMIN — FAMOTIDINE 20 MILLIGRAM(S): 10 INJECTION INTRAVENOUS at 18:06

## 2022-11-05 RX ADMIN — HEPARIN SODIUM 5000 UNIT(S): 5000 INJECTION INTRAVENOUS; SUBCUTANEOUS at 06:08

## 2022-11-05 RX ADMIN — Medication 100 MILLIGRAM(S): at 18:06

## 2022-11-05 RX ADMIN — Medication 81 MILLIGRAM(S): at 18:06

## 2022-11-05 RX ADMIN — VALSARTAN 320 MILLIGRAM(S): 80 TABLET ORAL at 06:07

## 2022-11-05 RX ADMIN — Medication 100 MILLIGRAM(S): at 06:07

## 2022-11-05 RX ADMIN — Medication 300 MILLIGRAM(S): at 18:06

## 2022-11-05 NOTE — PROGRESS NOTE ADULT - SUBJECTIVE AND OBJECTIVE BOX
SUBJECTIVE:    Patient is a 72y old Male who presents with a chief complaint of CP (04 Nov 2022 12:02)    Currently admitted to medicine with the primary diagnosis of Chest pain       Today is hospital day 1d. This morning he is resting comfortably in bed and reports no new issues or overnight events.     PAST MEDICAL & SURGICAL HISTORY  Renal cancer, left  Renal cell carcinoma s/p partial left nephrectomy (20% removed)    Gout    HTN (hypertension)    Back pain    GERD (gastroesophageal reflux disease)    H/O partial nephrectomy  9/17/2009 by Dr. Vergara    H/O cystoscopy  TURP 7/27/2005 by Dr. Vanessa Alonso&#x27;s tumor  Removed 2/3/2006 by Dr. Prieto    H/O laminectomy  L3/L4 9/11/2002 by Dr. Jara    H/O spinal fusion  L4/5-L5/S1 6/23/2008 by Dr. Jara with revision of fusion Transome Axial JF 3/1/2020 by Dr. Jara    Bladder polyp  S/p removal 4/4/2019 by Dr. Wylie    S/P CABG x 5  7/15/2020 Dr. Key , 5 vessel      SOCIAL HISTORY:  Negative for smoking/alcohol/drug use.     ALLERGIES:  adhesives (Rash)  Keflex (Diarrhea)  lisinopril (Other)  methylPREDNISolone (Pruritus)  rash (Rash)    MEDICATIONS:  STANDING MEDICATIONS  aDENosine Injectable (ADENOSCAN) 60 milliGRAM(s) IV Bolus once  allopurinol 300 milliGRAM(s) Oral daily  aspirin  chewable 81 milliGRAM(s) Oral daily  atorvastatin 40 milliGRAM(s) Oral at bedtime  famotidine    Tablet 20 milliGRAM(s) Oral daily  heparin   Injectable 5000 Unit(s) SubCutaneous every 8 hours  metoprolol tartrate 100 milliGRAM(s) Oral every 12 hours  valsartan 320 milliGRAM(s) Oral daily    PRN MEDICATIONS    VITALS:   T(F): 96  HR: 74  BP: 184/84  RR: 18  SpO2: 96%    PHYSICAL EXAM:  GEN: No acute distress  LUNGS: Clear to auscultation bilaterally   HEART: S1/S2 present.    ABD: Soft, non-tender, non-distended. Bowel sounds present       LABS:                        17.5   6.06  )-----------( 172      ( 05 Nov 2022 06:25 )             51.7     11-05    142  |  104  |  10  ----------------------------<  95  4.0   |  23  |  0.8    Ca    9.6      05 Nov 2022 06:25  Phos  3.7     11-05  Mg     1.4     11-05    TPro  6.6  /  Alb  4.2  /  TBili  1.2  /  DBili  x   /  AST  28  /  ALT  17  /  AlkPhos  75  11-04            Troponin T, Serum: <0.01 ng/mL (11-04-22 @ 21:40)  Troponin T, Serum: <0.01 ng/mL (11-04-22 @ 14:57)      CARDIAC MARKERS ( 04 Nov 2022 21:40 )  x     / <0.01 ng/mL / x     / x     / 3.9 ng/mL  CARDIAC MARKERS ( 04 Nov 2022 14:57 )  x     / <0.01 ng/mL / x     / x     / 4.4 ng/mL  CARDIAC MARKERS ( 04 Nov 2022 10:00 )  x     / <0.01 ng/mL / x     / x     / x            RADIOLOGY:

## 2022-11-05 NOTE — DISCHARGE NOTE PROVIDER - HOSPITAL COURSE
AMA discharge.     Patient wishes to be discharged AMA. Risks of being discharged AMA including worsening of condition and even death explained to the patient. Patient understands these risks and still wishs to be discharged AMA. No suicidal or homicidal ideations.

## 2022-11-05 NOTE — PROGRESS NOTE ADULT - SUBJECTIVE AND OBJECTIVE BOX
Subjective/Objective:     Pt evaluated at bedside, had stress test earlier today, states feeling better denies recurrence of chest pain/neck pain today    MEDICATIONS  (STANDING):  aDENosine Injectable (ADENOSCAN) 60 milliGRAM(s) IV Bolus once  allopurinol 300 milliGRAM(s) Oral daily  aspirin  chewable 81 milliGRAM(s) Oral daily  atorvastatin 40 milliGRAM(s) Oral at bedtime  famotidine    Tablet 20 milliGRAM(s) Oral daily  heparin   Injectable 5000 Unit(s) SubCutaneous every 8 hours  metoprolol tartrate 100 milliGRAM(s) Oral every 12 hours  valsartan 320 milliGRAM(s) Oral daily    MEDICATIONS  (PRN):          Vital Signs Last 24 Hrs  T(C): 35.6 (05 Nov 2022 05:46), Max: 36.1 (04 Nov 2022 21:34)  T(F): 96 (05 Nov 2022 05:46), Max: 97 (04 Nov 2022 21:34)  HR: 74 (05 Nov 2022 05:46) (71 - 74)  BP: 184/84 (05 Nov 2022 05:46) (154/76 - 184/84)  BP(mean): --  RR: 18 (05 Nov 2022 05:46) (18 - 18)  SpO2: 96% (04 Nov 2022 22:05) (96% - 96%)    Parameters below as of 04 Nov 2022 22:05  Patient On (Oxygen Delivery Method): room air      I&O's Detail      GENERAL:  71y/o Male NAD, resting comfortably.  HEAD:  Atraumatic, Normocephalic  EYES: EOMI, PERRLA, conjunctiva and sclera clear  NECK: Supple, No JVD, no cervical lymphadenopathy, non-tender  CHEST/LUNG: Clear to auscultation bilaterally; No wheeze, rhonchi, or rales  HEART: Regular rate and rhythm; S1&S2  ABDOMEN: Soft, Nontender, Nondistended x 4 quadrants; Bowel sounds present  EXTREMITIES:   Peripheral Pulses Present, No clubbing, no cyanosis, or no edema, no calf tenderness  PSYCH: AAOx3, cooperative, appropriate  NEUROLOGY: WNL  SKIN: WNL      EKG/ TELEM:    LABS:                          17.5   6.06  )-----------( 172      ( 05 Nov 2022 06:25 )             51.7       05 Nov 2022 06:25    142    |  104    |  10     ----------------------------<  95     4.0     |  23     |  0.8    04 Nov 2022 10:00    142    |  103    |  14     ----------------------------<  136<H>  4.3     |  27     |  0.9      Ca    9.6        05 Nov 2022 06:25  Ca    9.7        04 Nov 2022 10:00  Phos  3.7       05 Nov 2022 06:25  Mg     1.4<L>     05 Nov 2022 06:25    TPro  6.6    /  Alb  4.2    /  TBili  1.2    /  DBili  x      /  AST  28     /  ALT  17     /  AlkPhos  75     04 Nov 2022 10:00    CARDIAC MARKERS ( 04 Nov 2022 21:40 )  x     / <0.01 ng/mL / x     / x     / 3.9 ng/mL  CARDIAC MARKERS ( 04 Nov 2022 14:57 )  x     / <0.01 ng/mL / x     / x     / 4.4 ng/mL  CARDIAC MARKERS ( 04 Nov 2022 10:00 )  x     / <0.01 ng/mL / x     / x     / x          Diagnostic testing:  NM  < from: NM Nuclear Stress Pharmacologic Multiple (11.05.22 @ 13:42) >  Impression:  1. NORMAL ADENOSINE / REST MYOCARDIAL PERFUSION TOMOGRAPHY, WITH NO   EVIDENCE FOR ISCHEMIA DURING ADENOSINE INFUSION.  2. NORMAL RESTING LEFT VENTRICULAR WALL MOTION AND WALL THICKENING.  3. LEFT VENTRICULAR EJECTION FRACTION OF  74% WHICH IS WITHIN RANGE OF   NORMAL.    --- End of Report ---    < end of copied text >        Assessment and Plan:

## 2022-11-05 NOTE — DISCHARGE NOTE PROVIDER - NSDCCPCAREPLAN_GEN_ALL_CORE_FT
PRINCIPAL DISCHARGE DIAGNOSIS  Diagnosis: Chest pain  Assessment and Plan of Treatment: AMA discharge

## 2022-11-05 NOTE — DISCHARGE NOTE PROVIDER - PROVIDER TOKENS
ELGIN  GROUP DOCUMENTATION INDIVIDUAL Group Therapy Note Date: 1/19/2021 Group Start Time: 2900 Group End Time: 0830 Group Topic: Nursing [facility] 1 ADULT CHEM DEP Darryn EISENBERG  GROUP DOCUMENTATION GROUP Group Therapy Note Attendees: 3 Attendance: Did not attend Flaquita Fontanez PROVIDER:[TOKEN:[[id number]],FOLLOWUP:[1 week]]

## 2022-11-05 NOTE — DISCHARGE NOTE PROVIDER - CARE PROVIDER_API CALL
Vinita Farrell  Internal Medicine  [address] [place] [address]  Phone: [phone]  Fax: ()-  Follow Up Time: 1 week

## 2022-11-05 NOTE — PROGRESS NOTE ADULT - ASSESSMENT
1. R/O ACS  tele monitoring  Trops noted  Stress today  f/u further cardio recs      2. CAD/CABG; HTN   - cont ASA 81, metoprolol, valsartan    3. HLD  - cont atorvastatin    4. GOUT   - cont allopurinol    5. GERD   - pepcid     5. VTE prophylaxis   - Sq heparin    d/c once cleared by cardio

## 2022-11-05 NOTE — PROGRESS NOTE ADULT - ASSESSMENT
72 yr M w/PMHX: CAD/s/p CABG  (last in 7/15/2020 Dr. Key, 5 vessels),  HTN, HLD, chronic back pain, BPH, GERD, gout, and renal cell carcinoma s/p partial left nephrectomy - recently seen in [facility] for CP on 11/1/22 -returns to ER today for chest pain upon awakening      Impression:  # Chest pain: ACS ruled out  # Uncontrolled HTN  # h/o CAD/s/p CABG  (last in 7/15/2020     CE wnl ECG: NS, no acute changes  ECHO(2020): EF 59%, Normal global LVSF; - offered TTE this admission but patient declined and wants to follow up outpatient  NM stress 11/05 no evidence of ischemia estimated EF 74%    Plan  Currently asymptomatic denies chest/neck pain/pressure  - check lipid, TSH and A1c.   - Continue with medical therapy.   - F/U with Dr Fernandes     72 yr M w/PMHX: CAD/s/p CABG  (last in 7/15/2020 Dr. Key, 5 vessels),  HTN, HLD, chronic back pain, BPH, GERD, gout, and renal cell carcinoma s/p partial left nephrectomy - recently seen in [facility] for CP on 11/1/22 -returns to ER today for chest pain upon awakening      Impression:  # Chest pain: ACS ruled out  # Uncontrolled HTN      CE wnl ECG: NS, no acute changes  ECHO(2020): EF 59%, Normal global LVSF; - offered TTE this admission but patient declined and wants to follow up outpatient  NM stress 11/05 no evidence of ischemia estimated EF 74%    Plan  Currently asymptomatic denies chest/neck pain/pressure  - check lipid, TSH and A1c.   - c/w Asa, BB, Satin therapy  - F/U with Dr Fernandes     72 yr M w/PMHX: CAD/s/p CABG  (last in 7/15/2020 Dr. Key, 5 vessels),  HTN, HLD, chronic back pain, BPH, GERD, gout, and renal cell carcinoma s/p partial left nephrectomy - recently seen in [facility] for CP on 11/1/22 -returns to ER today for chest pain upon awakening      Impression:  # Chest pain: ACS ruled out  # Uncontrolled HTN      CE wnl ECG: NS, no acute changes  ECHO(2020): EF 59%, Normal global LVSF; - offered TTE this admission but patient declined and wants to follow up outpatient  NM stress 11/05 no evidence of ischemia estimated EF 74%    Plan  Currently asymptomatic denies chest/neck pain/pressure  - check lipid, TSH and A1c.   - c/w Asa, Valsartan 320  - Can switch BB to Labetalol 200mg po BID for adequate control  - F/U with Dr Fernandes     72 yr M w/PMHX: CAD/s/p CABG  (last in 7/15/2020 Dr. Key, 5 vessels),  HTN, HLD, chronic back pain, BPH, GERD, gout, and renal cell carcinoma s/p partial left nephrectomy - recently seen in [facility] for CP on 11/1/22 -returns to ER today for chest pain upon awakening    Impression:  # Chest pain: ACS ruled out  # Uncontrolled HTN - Improved    CE wnl ECG: NS, no acute changes  ECHO(2020): EF 59%, Normal global LVSF; - offered TTE this admission but patient declined and wants to follow up outpatient  NM stress 11/05 no evidence of ischemia estimated EF 74%    Plan  Currently asymptomatic denies chest/neck pain/pressure  - check lipid, TSH and A1c.   - c/w Asa, Valsartan 320, Lopressor 100bid  - F/U with Dr Fernandes     72 yr M w/PMHX: CAD/s/p CABG  (last in 7/15/2020 Dr. Key, 5 vessels),  HTN, HLD, chronic back pain, BPH, GERD, gout, and renal cell carcinoma s/p partial left nephrectomy - recently seen in [facility] for CP on 11/1/22 -returns to ER today for chest pain upon awakening    Impression:  # Chest pain: ACS ruled out  # Uncontrolled HTN - Improved    CE wnl ECG: NS, no acute changes  ECHO(2020): EF 59%, Normal global LVSF; - offered TTE this admission but patient declined and wants to follow up outpatient  NM stress 11/05 no evidence of ischemia estimated EF 74%    Plan  Currently asymptomatic denies chest/neck pain/pressure  - check lipid, TSH and A1c.   - c/w Asa, Valsartan 320, Lopressor 100bid  - F/U with cardiology outpatient

## 2022-11-05 NOTE — DISCHARGE NOTE PROVIDER - NSDCMRMEDTOKEN_GEN_ALL_CORE_FT
allopurinol 300 mg oral tablet: 1 tab(s) orally once a day  aspirin 81 mg oral tablet, chewable: 1 tab(s) orally once a day  atorvastatin 40 mg oral tablet: 1 tab(s) orally once a day (at bedtime)  Fish Oil 1200 mg oral capsule: orally once a day  Flax Seed Oil oral capsule: 1200 milligram(s) orally once a day  metoprolol tartrate 100 mg oral tablet: 1 tab(s) orally every 12 hours  PriLOSEC OTC 20 mg oral delayed release tablet: 1 tab(s) orally Tuesday, Thursday, Saturday, Sunday  valsartan 320 mg oral tablet: 1 tab(s) orally once a day

## 2022-11-16 DIAGNOSIS — I10 ESSENTIAL (PRIMARY) HYPERTENSION: ICD-10-CM

## 2022-11-16 DIAGNOSIS — Z79.82 LONG TERM (CURRENT) USE OF ASPIRIN: ICD-10-CM

## 2022-11-16 DIAGNOSIS — Z53.29 PROCEDURE AND TREATMENT NOT CARRIED OUT BECAUSE OF PATIENT'S DECISION FOR OTHER REASONS: ICD-10-CM

## 2022-11-16 DIAGNOSIS — N40.0 BENIGN PROSTATIC HYPERPLASIA WITHOUT LOWER URINARY TRACT SYMPTOMS: ICD-10-CM

## 2022-11-16 DIAGNOSIS — G89.29 OTHER CHRONIC PAIN: ICD-10-CM

## 2022-11-16 DIAGNOSIS — Z85.53 PERSONAL HISTORY OF MALIGNANT NEOPLASM OF RENAL PELVIS: ICD-10-CM

## 2022-11-16 DIAGNOSIS — Z95.1 PRESENCE OF AORTOCORONARY BYPASS GRAFT: ICD-10-CM

## 2022-11-16 DIAGNOSIS — E78.5 HYPERLIPIDEMIA, UNSPECIFIED: ICD-10-CM

## 2022-11-16 DIAGNOSIS — M54.9 DORSALGIA, UNSPECIFIED: ICD-10-CM

## 2022-11-16 DIAGNOSIS — R07.9 CHEST PAIN, UNSPECIFIED: ICD-10-CM

## 2022-11-16 DIAGNOSIS — Z87.891 PERSONAL HISTORY OF NICOTINE DEPENDENCE: ICD-10-CM

## 2022-11-16 DIAGNOSIS — I25.119 ATHEROSCLEROTIC HEART DISEASE OF NATIVE CORONARY ARTERY WITH UNSPECIFIED ANGINA PECTORIS: ICD-10-CM

## 2022-11-16 DIAGNOSIS — M10.9 GOUT, UNSPECIFIED: ICD-10-CM

## 2022-11-16 DIAGNOSIS — K21.9 GASTRO-ESOPHAGEAL REFLUX DISEASE WITHOUT ESOPHAGITIS: ICD-10-CM

## 2022-11-16 DIAGNOSIS — Z90.5 ACQUIRED ABSENCE OF KIDNEY: ICD-10-CM

## 2022-12-08 DIAGNOSIS — Z90.5 ACQUIRED ABSENCE OF KIDNEY: ICD-10-CM

## 2022-12-08 DIAGNOSIS — M54.9 DORSALGIA, UNSPECIFIED: ICD-10-CM

## 2022-12-08 DIAGNOSIS — N40.0 BENIGN PROSTATIC HYPERPLASIA WITHOUT LOWER URINARY TRACT SYMPTOMS: ICD-10-CM

## 2022-12-08 DIAGNOSIS — K21.9 GASTRO-ESOPHAGEAL REFLUX DISEASE WITHOUT ESOPHAGITIS: ICD-10-CM

## 2022-12-08 DIAGNOSIS — Z91.048 OTHER NONMEDICINAL SUBSTANCE ALLERGY STATUS: ICD-10-CM

## 2022-12-08 DIAGNOSIS — R07.9 CHEST PAIN, UNSPECIFIED: ICD-10-CM

## 2022-12-08 DIAGNOSIS — Z95.1 PRESENCE OF AORTOCORONARY BYPASS GRAFT: ICD-10-CM

## 2022-12-08 DIAGNOSIS — Z79.82 LONG TERM (CURRENT) USE OF ASPIRIN: ICD-10-CM

## 2022-12-08 DIAGNOSIS — I10 ESSENTIAL (PRIMARY) HYPERTENSION: ICD-10-CM

## 2022-12-08 DIAGNOSIS — Z20.822 CONTACT WITH AND (SUSPECTED) EXPOSURE TO COVID-19: ICD-10-CM

## 2022-12-08 DIAGNOSIS — Z85.528 PERSONAL HISTORY OF OTHER MALIGNANT NEOPLASM OF KIDNEY: ICD-10-CM

## 2022-12-08 DIAGNOSIS — I25.10 ATHEROSCLEROTIC HEART DISEASE OF NATIVE CORONARY ARTERY WITHOUT ANGINA PECTORIS: ICD-10-CM

## 2022-12-08 DIAGNOSIS — R03.0 ELEVATED BLOOD-PRESSURE READING, WITHOUT DIAGNOSIS OF HYPERTENSION: ICD-10-CM

## 2022-12-08 DIAGNOSIS — Z88.1 ALLERGY STATUS TO OTHER ANTIBIOTIC AGENTS STATUS: ICD-10-CM

## 2022-12-08 DIAGNOSIS — Z87.891 PERSONAL HISTORY OF NICOTINE DEPENDENCE: ICD-10-CM

## 2022-12-08 DIAGNOSIS — Z86.16 PERSONAL HISTORY OF COVID-19: ICD-10-CM

## 2022-12-08 DIAGNOSIS — R94.31 ABNORMAL ELECTROCARDIOGRAM [ECG] [EKG]: ICD-10-CM

## 2022-12-08 DIAGNOSIS — M10.9 GOUT, UNSPECIFIED: ICD-10-CM

## 2022-12-08 DIAGNOSIS — Z88.8 ALLERGY STATUS TO OTHER DRUGS, MEDICAMENTS AND BIOLOGICAL SUBSTANCES STATUS: ICD-10-CM

## 2022-12-08 DIAGNOSIS — Z91.040 LATEX ALLERGY STATUS: ICD-10-CM

## 2022-12-08 DIAGNOSIS — G89.29 OTHER CHRONIC PAIN: ICD-10-CM

## 2023-03-27 ENCOUNTER — APPOINTMENT (OUTPATIENT)
Dept: ORTHOPEDIC SURGERY | Facility: CLINIC | Age: 73
End: 2023-03-27
Payer: MEDICARE

## 2023-03-27 PROCEDURE — 99213 OFFICE O/P EST LOW 20 MIN: CPT

## 2023-03-27 NOTE — HISTORY OF PRESENT ILLNESS
[de-identified] : Patient is a 72-year-old male here for reevaluation of left knee osteoarthritis.  Patient was last seen in June 2022 and was given a cortisone injection to the left knee.  Patient states injection slightly helped the left knee but had pain again a few weeks after.  Patient states he has been dealing with pain over time but pain worsens with activity, weather.  Patient states over the past few weeks pain has been worsening without any injury or trauma.  Patient denies numbness/tingling, instability.

## 2023-03-27 NOTE — DISCUSSION/SUMMARY
[de-identified] : Discussed prior x-rays in detail with patient showing moderate to advanced osteoarthritis of left knee.  Discussed treatment options detail including weight loss, ice/heat, rest, range of motion exercise, physical therapy, cortisone injection, gel injection.  Patient currently declined cortisone injection, patient is interested in viscosupplementation.  Synvisc 3 injections ordered for authorization left knee.  Patient will follow-up in 6 weeks for injections reevaluation.  Naproxen 500 mg twice daily sent to patient's pharmacy, discussed side effects in detail.  Call with any questions or concerns.  Patient understands agrees with plan.  Seen under supervision of Dr. Early.

## 2023-04-04 NOTE — DISCHARGE NOTE PROVIDER - NPI NUMBER (FOR SYSADMIN USE ONLY) :
Spoke with patient and told her that her PCP appt is on 4/13 at 0900  I gave her the exact address and doctor's name  Patient was thankful for the call  [[id number]]

## 2023-05-09 ENCOUNTER — APPOINTMENT (OUTPATIENT)
Dept: UROLOGY | Facility: CLINIC | Age: 73
End: 2023-05-09
Payer: MEDICARE

## 2023-05-09 VITALS
HEART RATE: 77 BPM | SYSTOLIC BLOOD PRESSURE: 144 MMHG | WEIGHT: 246 LBS | BODY MASS INDEX: 37.28 KG/M2 | TEMPERATURE: 98.5 F | RESPIRATION RATE: 18 BRPM | DIASTOLIC BLOOD PRESSURE: 81 MMHG | HEIGHT: 68 IN | OXYGEN SATURATION: 98 %

## 2023-05-09 DIAGNOSIS — Z00.00 ENCOUNTER FOR GENERAL ADULT MEDICAL EXAMINATION W/OUT ABNORMAL FINDINGS: ICD-10-CM

## 2023-05-09 LAB
BILIRUB UR QL STRIP: NORMAL
COLLECTION METHOD: NORMAL
GLUCOSE UR-MCNC: NORMAL
HCG UR QL: 0.2 EU/DL
HGB UR QL STRIP.AUTO: NORMAL
KETONES UR-MCNC: NORMAL
LEUKOCYTE ESTERASE UR QL STRIP: NORMAL
NITRITE UR QL STRIP: NORMAL
PH UR STRIP: 5.5
PROT UR STRIP-MCNC: 300
SP GR UR STRIP: 1.03

## 2023-05-09 PROCEDURE — 99213 OFFICE O/P EST LOW 20 MIN: CPT

## 2023-05-19 ENCOUNTER — APPOINTMENT (OUTPATIENT)
Dept: ORTHOPEDIC SURGERY | Facility: CLINIC | Age: 73
End: 2023-05-19
Payer: MEDICARE

## 2023-05-19 PROCEDURE — 20610 DRAIN/INJ JOINT/BURSA W/O US: CPT | Mod: LT

## 2023-05-19 PROCEDURE — 99213 OFFICE O/P EST LOW 20 MIN: CPT | Mod: 25

## 2023-05-19 NOTE — HISTORY OF PRESENT ILLNESS
[de-identified] : Patient here for reevaluation of left knee osteoarthritis and for Synvisc injections.  Denies change in symptoms.

## 2023-05-19 NOTE — PHYSICAL EXAM
[Left] : left knee [4___] : hamstring 4[unfilled]/5 [] : light touch is intact throughout [TWNoteComboBox7] : flexion 125 degrees [de-identified] : extension 0 degrees

## 2023-05-19 NOTE — DISCUSSION/SUMMARY
[de-identified] : Plan is for Synvisc injection left knee.\par \par \par An injection of Synvisc was injected into LT knee after verbal consent using sterile technique. The risks, benefits, and alternatives to Visco-supplementation injection were explained in full to the patient. Risks outlined include but are not limited to infection, sepsis, bleeding, scarring, skin discoloration, temporary increase in pain, syncopal episode, failure to resolve symptoms, allergic reaction, and symptom recurrence. Signs and symptoms of infection reviewed, and patients advised to call immediately for redness, fevers, and/or chills. Patient understood the risks. All questions were answered. Sterile technique was used without complications. The patient tolerated the procedure well. Ice tonight to the injection site.  \par \par Patient will follow-up in 1 week for second injection.

## 2023-05-26 ENCOUNTER — APPOINTMENT (OUTPATIENT)
Dept: ORTHOPEDIC SURGERY | Facility: CLINIC | Age: 73
End: 2023-05-26
Payer: MEDICARE

## 2023-05-26 PROCEDURE — 20610 DRAIN/INJ JOINT/BURSA W/O US: CPT | Mod: LT

## 2023-05-26 NOTE — DISCUSSION/SUMMARY
[de-identified] : Plan is for Synvisc injection left knee.\par \par \par An injection of Synvisc was injected into LT knee after verbal consent using sterile technique. The risks, benefits, and alternatives to Visco-supplementation injection were explained in full to the patient. Risks outlined include but are not limited to infection, sepsis, bleeding, scarring, skin discoloration, temporary increase in pain, syncopal episode, failure to resolve symptoms, allergic reaction, and symptom recurrence. Signs and symptoms of infection reviewed, and patients advised to call immediately for redness, fevers, and/or chills. Patient understood the risks. All questions were answered. Sterile technique was used without complications. The patient tolerated the procedure well. Ice tonight to the injection site.  \par \par Patient will follow-up in 1 week for third injection.

## 2023-05-26 NOTE — HISTORY OF PRESENT ILLNESS
[de-identified] : Here for second Synvisc injection left knee.  Denies adverse reaction on first injection.

## 2023-06-02 ENCOUNTER — APPOINTMENT (OUTPATIENT)
Dept: ORTHOPEDIC SURGERY | Facility: CLINIC | Age: 73
End: 2023-06-02
Payer: MEDICARE

## 2023-06-02 PROCEDURE — 20610 DRAIN/INJ JOINT/BURSA W/O US: CPT | Mod: LT

## 2023-06-02 NOTE — HISTORY OF PRESENT ILLNESS
[de-identified] : Here for third Synvisc injection left knee.  Denies adverse reaction on first injection.

## 2023-06-02 NOTE — DISCUSSION/SUMMARY
[de-identified] : Plan is for Synvisc injection left knee.\par \par \par An injection of Synvisc was injected into LT knee after verbal consent using sterile technique. The risks, benefits, and alternatives to Visco-supplementation injection were explained in full to the patient. Risks outlined include but are not limited to infection, sepsis, bleeding, scarring, skin discoloration, temporary increase in pain, syncopal episode, failure to resolve symptoms, allergic reaction, and symptom recurrence. Signs and symptoms of infection reviewed, and patients advised to call immediately for redness, fevers, and/or chills. Patient understood the risks. All questions were answered. Sterile technique was used without complications. The patient tolerated the procedure well. Ice tonight to the injection site.  \par \par Patient will follow-up in 6 weeks for reevaluation.  Call with any questions or concerns.  Patient says he has a plan.

## 2023-07-21 ENCOUNTER — APPOINTMENT (OUTPATIENT)
Dept: ORTHOPEDIC SURGERY | Facility: CLINIC | Age: 73
End: 2023-07-21
Payer: MEDICARE

## 2023-07-21 DIAGNOSIS — M17.12 UNILATERAL PRIMARY OSTEOARTHRITIS, LEFT KNEE: ICD-10-CM

## 2023-07-21 PROCEDURE — 99213 OFFICE O/P EST LOW 20 MIN: CPT

## 2023-07-21 NOTE — HISTORY OF PRESENT ILLNESS
[de-identified] : Patient is a 71-year-old male here for reevaluation bilateral knee osteoarthritis.  Patient states that his left knee is feeling great, no complaints.  Patient states over the past few months his right knee pain has been worsening.  Denies recent injury/trauma, numbness/tingling, instability.  Patient has worsening pain with activity, going up and down stairs.

## 2023-07-21 NOTE — PHYSICAL EXAM
[Bilateral] : knee bilaterally [5___] : hamstring 5[unfilled]/5 [] : light touch is intact throughout [FreeTextEntry9] : Pain to range of motion right knee.  No pain left knee. [TWNoteComboBox7] : flexion 125 degrees [de-identified] : extension 0 degrees

## 2023-07-21 NOTE — DISCUSSION/SUMMARY
[de-identified] : Discussed prior x-rays with patient showing moderate osteoarthritis bilateral knees.  Discussed treatment option detail including rest, ice/heat, range of motion exercise, physical therapy, cortisone injection, gel injection.  Patient interested in gel injection right knee.  Synvisc injection ordered for right knee.  Patient will follow-up in 6 weeks for reevaluation and for injection.  Call if any questions or concerns.  Patient understands agrees with plan.

## 2023-08-01 NOTE — H&P ADULT - REASON FOR ADMISSION
Phuc presents to IS for q 8 week Avsola infusion for psoriatic arthritis.  Phuc denies any new or acute changes, denies s/s active infections or open wounds.  PIV placed, flushes easily with brisk blood return.  Avsola infused as order with filter in place over 1 hour.  Phuc tolerated well.  PIV flushed and removed, gauze and coban to site.  Discharged in NAD, next appointment confirmed.    chest pain

## 2023-09-01 ENCOUNTER — APPOINTMENT (OUTPATIENT)
Dept: ORTHOPEDIC SURGERY | Facility: CLINIC | Age: 73
End: 2023-09-01
Payer: MEDICARE

## 2023-09-01 PROCEDURE — 20610 DRAIN/INJ JOINT/BURSA W/O US: CPT | Mod: RT

## 2023-09-01 PROCEDURE — 99213 OFFICE O/P EST LOW 20 MIN: CPT | Mod: 25

## 2023-09-01 NOTE — PHYSICAL EXAM
[Right] : right knee [5___] : hamstring 5[unfilled]/5 [] : light touch is intact throughout [TWNoteComboBox7] : flexion 125 degrees [FreeTextEntry9] : Pain to range of motion right knee.  No pain left knee. [de-identified] : extension 0 degrees

## 2023-09-01 NOTE — DISCUSSION/SUMMARY
[de-identified] : Plan for Synvisc injection right knee.  An injection of Synvisc was injected into right knee after verbal consent using sterile technique. The risks, benefits, and alternatives to Visco-supplementation injection were explained in full to the patient. Risks outlined include but are not limited to infection, sepsis, bleeding, scarring, skin discoloration, temporary increase in pain, syncopal episode, failure to resolve symptoms, allergic reaction, and symptom recurrence. Signs and symptoms of infection reviewed, and patients advised to call immediately for redness, fevers, and/or chills. Patient understood the risks. All questions were answered. Sterile technique was used without complications. The patient tolerated the procedure well. Ice tonight to the injection site.        We will follow-up in 1 week for second injection.  Call if any questions or concerns.  Patient understands agrees with plan.

## 2023-09-01 NOTE — HISTORY OF PRESENT ILLNESS
[de-identified] : Patient is a 72-year-old male here for reevaluation of right knee and for Synvisc injection.  Denies change in symptoms

## 2023-09-08 ENCOUNTER — APPOINTMENT (OUTPATIENT)
Dept: ORTHOPEDIC SURGERY | Facility: CLINIC | Age: 73
End: 2023-09-08
Payer: MEDICARE

## 2023-09-08 PROCEDURE — 20610 DRAIN/INJ JOINT/BURSA W/O US: CPT | Mod: RT

## 2023-09-08 NOTE — DISCUSSION/SUMMARY
[de-identified] : Plan for Synvisc injection right knee.  An injection of Synvisc was injected into right knee after verbal consent using sterile technique. The risks, benefits, and alternatives to Visco-supplementation injection were explained in full to the patient. Risks outlined include but are not limited to infection, sepsis, bleeding, scarring, skin discoloration, temporary increase in pain, syncopal episode, failure to resolve symptoms, allergic reaction, and symptom recurrence. Signs and symptoms of infection reviewed, and patients advised to call immediately for redness, fevers, and/or chills. Patient understood the risks. All questions were answered. Sterile technique was used without complications. The patient tolerated the procedure well. Ice tonight to the injection site.        We will follow-up in 1 week for 3rd injection.  Call if any questions or concerns.  Patient understands agrees with plan.

## 2023-09-15 ENCOUNTER — APPOINTMENT (OUTPATIENT)
Dept: ORTHOPEDIC SURGERY | Facility: CLINIC | Age: 73
End: 2023-09-15
Payer: MEDICARE

## 2023-09-15 PROCEDURE — 20610 DRAIN/INJ JOINT/BURSA W/O US: CPT | Mod: RT

## 2023-10-12 NOTE — ED PROVIDER NOTE - NS ED SCRIBE STATEMENT
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       Objective     See Exercise, Manual, and Modality Logs for complete treatment.     Assessment/Plan Patient tolerated all progressed NMR well with only reports of increased fatigue. Patient did require UE support consistently throughout session and will continue to benefit from improved R ankle strengthening for improved stability and activity tolerance with daily functional activity.       Progress per Plan of Care and Progress strengthening /stabilization /functional activity         Timed:         Manual Therapy:         mins  07358;     Therapeutic Exercise:    10     mins  77253;     Neuromuscular Annamarie:    20    mins  70588;    Therapeutic Activity:          mins  85225;     Gait Training:           mins  41757;     Ultrasound:          mins  00300;    Ionto                                   mins   81364  Self Care                    _____  mins    08767  [facility]                   mins  50446    Un-Timed:  Electrical Stimulation:         mins  52040 ( );  Dry Needling          mins self-pay   Traction          mins 32146    Timed Treatment:   35   mins   Total Treatment:     35   mins    Matthew Olson PTA  [place] [id number]  Physical Therapist Assistant     Attending

## 2023-10-25 NOTE — PRE-OP CHECKLIST - BP NONINVASIVE SYSTOLIC (MM HG)
130 Dutasteride Counseling: Dustasteride Counseling:  I discussed with the patient the risks of use of dutasteride including but not limited to decreased libido, decreased ejaculate volume, and gynecomastia. Women who can become pregnant should not handle medication.  All of the patient's questions and concerns were addressed. Dutasteride Male Counseling: Dustasteride Counseling:  I discussed with the patient the risks of use of dutasteride including but not limited to decreased libido, decreased ejaculate volume, and gynecomastia. Women who can become pregnant should not handle medication.  All of the patient's questions and concerns were addressed.

## 2023-12-15 ENCOUNTER — APPOINTMENT (OUTPATIENT)
Dept: ORTHOPEDIC SURGERY | Facility: CLINIC | Age: 73
End: 2023-12-15
Payer: MEDICARE

## 2023-12-15 PROCEDURE — 99213 OFFICE O/P EST LOW 20 MIN: CPT

## 2023-12-15 NOTE — DISCUSSION/SUMMARY
[de-identified] : Patient is doing well at this point.  Discussed with patient viscosupplementation injection can be repeated in another 2 to 3 months if needed.  At this point patient will see how long it lasts and will follow-up as needed.  Call with any questions or concerns.  Patient understands agrees with plan.

## 2023-12-15 NOTE — HISTORY OF PRESENT ILLNESS
[de-identified] : Patient is a 73-year-old male here for reevaluation of right knee.  3 months ago patient had a viscosupplementation injection.  Patient states that since then he has been doing well, minimal pain that sometimes comes and goes.

## 2023-12-15 NOTE — PHYSICAL EXAM
[Right] : right knee [5___] : hamstring 5[unfilled]/5 [] : light touch is intact throughout [FreeTextEntry9] : Pain to range of motion right knee.  No pain left knee. [TWNoteComboBox7] : flexion 125 degrees [de-identified] : extension 0 degrees

## 2024-02-14 ENCOUNTER — INPATIENT (INPATIENT)
Facility: HOSPITAL | Age: 74
LOS: 2 days | Discharge: HOME | DRG: 305 | End: 2024-02-17
Attending: INTERNAL MEDICINE | Admitting: STUDENT IN AN ORGANIZED HEALTH CARE EDUCATION/TRAINING PROGRAM
Payer: MEDICARE

## 2024-02-14 VITALS
SYSTOLIC BLOOD PRESSURE: 221 MMHG | TEMPERATURE: 97 F | WEIGHT: 229.94 LBS | HEART RATE: 97 BPM | DIASTOLIC BLOOD PRESSURE: 100 MMHG | RESPIRATION RATE: 16 BRPM | OXYGEN SATURATION: 96 %

## 2024-02-14 DIAGNOSIS — D41.4 NEOPLASM OF UNCERTAIN BEHAVIOR OF BLADDER: Chronic | ICD-10-CM

## 2024-02-14 DIAGNOSIS — D11.9 BENIGN NEOPLASM OF MAJOR SALIVARY GLAND, UNSPECIFIED: Chronic | ICD-10-CM

## 2024-02-14 DIAGNOSIS — Z90.5 ACQUIRED ABSENCE OF KIDNEY: Chronic | ICD-10-CM

## 2024-02-14 DIAGNOSIS — Z98.890 OTHER SPECIFIED POSTPROCEDURAL STATES: Chronic | ICD-10-CM

## 2024-02-14 DIAGNOSIS — Z95.1 PRESENCE OF AORTOCORONARY BYPASS GRAFT: Chronic | ICD-10-CM

## 2024-02-14 DIAGNOSIS — Z98.1 ARTHRODESIS STATUS: Chronic | ICD-10-CM

## 2024-02-14 LAB
ALBUMIN SERPL ELPH-MCNC: 4.2 G/DL — SIGNIFICANT CHANGE UP (ref 3.5–5.2)
ALP SERPL-CCNC: 75 U/L — SIGNIFICANT CHANGE UP (ref 30–115)
ALT FLD-CCNC: 18 U/L — SIGNIFICANT CHANGE UP (ref 0–41)
ANION GAP SERPL CALC-SCNC: 14 MMOL/L — SIGNIFICANT CHANGE UP (ref 7–14)
AST SERPL-CCNC: 22 U/L — SIGNIFICANT CHANGE UP (ref 0–41)
BASOPHILS # BLD AUTO: 0.02 K/UL — SIGNIFICANT CHANGE UP (ref 0–0.2)
BASOPHILS NFR BLD AUTO: 0.2 % — SIGNIFICANT CHANGE UP (ref 0–1)
BILIRUB SERPL-MCNC: 1.2 MG/DL — SIGNIFICANT CHANGE UP (ref 0.2–1.2)
BUN SERPL-MCNC: 16 MG/DL — SIGNIFICANT CHANGE UP (ref 10–20)
CALCIUM SERPL-MCNC: 9.6 MG/DL — SIGNIFICANT CHANGE UP (ref 8.4–10.4)
CHLORIDE SERPL-SCNC: 102 MMOL/L — SIGNIFICANT CHANGE UP (ref 98–110)
CO2 SERPL-SCNC: 27 MMOL/L — SIGNIFICANT CHANGE UP (ref 17–32)
CREAT SERPL-MCNC: 0.8 MG/DL — SIGNIFICANT CHANGE UP (ref 0.7–1.5)
EGFR: 93 ML/MIN/1.73M2 — SIGNIFICANT CHANGE UP
EOSINOPHIL # BLD AUTO: 0.1 K/UL — SIGNIFICANT CHANGE UP (ref 0–0.7)
EOSINOPHIL NFR BLD AUTO: 1.2 % — SIGNIFICANT CHANGE UP (ref 0–8)
GLUCOSE SERPL-MCNC: 128 MG/DL — HIGH (ref 70–99)
HCT VFR BLD CALC: 52.7 % — HIGH (ref 42–52)
HGB BLD-MCNC: 17.2 G/DL — SIGNIFICANT CHANGE UP (ref 14–18)
IMM GRANULOCYTES NFR BLD AUTO: 0.2 % — SIGNIFICANT CHANGE UP (ref 0.1–0.3)
LYMPHOCYTES # BLD AUTO: 1.35 K/UL — SIGNIFICANT CHANGE UP (ref 1.2–3.4)
LYMPHOCYTES # BLD AUTO: 16.8 % — LOW (ref 20.5–51.1)
MCHC RBC-ENTMCNC: 31 PG — SIGNIFICANT CHANGE UP (ref 27–31)
MCHC RBC-ENTMCNC: 32.6 G/DL — SIGNIFICANT CHANGE UP (ref 32–37)
MCV RBC AUTO: 95.1 FL — HIGH (ref 80–94)
MONOCYTES # BLD AUTO: 0.73 K/UL — HIGH (ref 0.1–0.6)
MONOCYTES NFR BLD AUTO: 9.1 % — SIGNIFICANT CHANGE UP (ref 1.7–9.3)
NEUTROPHILS # BLD AUTO: 5.81 K/UL — SIGNIFICANT CHANGE UP (ref 1.4–6.5)
NEUTROPHILS NFR BLD AUTO: 72.5 % — SIGNIFICANT CHANGE UP (ref 42.2–75.2)
NRBC # BLD: 0 /100 WBCS — SIGNIFICANT CHANGE UP (ref 0–0)
NT-PROBNP SERPL-SCNC: 306 PG/ML — HIGH (ref 0–300)
PLATELET # BLD AUTO: 182 K/UL — SIGNIFICANT CHANGE UP (ref 130–400)
PMV BLD: 10.6 FL — HIGH (ref 7.4–10.4)
POTASSIUM SERPL-MCNC: 3.9 MMOL/L — SIGNIFICANT CHANGE UP (ref 3.5–5)
POTASSIUM SERPL-SCNC: 3.9 MMOL/L — SIGNIFICANT CHANGE UP (ref 3.5–5)
PROT SERPL-MCNC: 6.7 G/DL — SIGNIFICANT CHANGE UP (ref 6–8)
RBC # BLD: 5.54 M/UL — SIGNIFICANT CHANGE UP (ref 4.7–6.1)
RBC # FLD: 14.1 % — SIGNIFICANT CHANGE UP (ref 11.5–14.5)
SODIUM SERPL-SCNC: 143 MMOL/L — SIGNIFICANT CHANGE UP (ref 135–146)
TROPONIN T, HIGH SENSITIVITY RESULT: 26 NG/L — HIGH (ref 6–21)
TROPONIN T, HIGH SENSITIVITY RESULT: 30 NG/L — HIGH (ref 6–21)
WBC # BLD: 8.03 K/UL — SIGNIFICANT CHANGE UP (ref 4.8–10.8)
WBC # FLD AUTO: 8.03 K/UL — SIGNIFICANT CHANGE UP (ref 4.8–10.8)

## 2024-02-14 PROCEDURE — 71045 X-RAY EXAM CHEST 1 VIEW: CPT | Mod: 26

## 2024-02-14 PROCEDURE — 99223 1ST HOSP IP/OBS HIGH 75: CPT | Mod: FS

## 2024-02-14 RX ORDER — METOPROLOL TARTRATE 50 MG
100 TABLET ORAL DAILY
Refills: 0 | Status: DISCONTINUED | OUTPATIENT
Start: 2024-02-14 | End: 2024-02-15

## 2024-02-14 RX ORDER — REGADENOSON 0.08 MG/ML
0.4 INJECTION, SOLUTION INTRAVENOUS ONCE
Refills: 0 | Status: DISCONTINUED | OUTPATIENT
Start: 2024-02-14 | End: 2024-02-17

## 2024-02-14 RX ORDER — NITROGLYCERIN 6.5 MG
0.4 CAPSULE, EXTENDED RELEASE ORAL ONCE
Refills: 0 | Status: COMPLETED | OUTPATIENT
Start: 2024-02-14 | End: 2024-02-14

## 2024-02-14 RX ADMIN — Medication 100 MILLIGRAM(S): at 20:28

## 2024-02-14 RX ADMIN — Medication 0.4 MILLIGRAM(S): at 20:28

## 2024-02-14 NOTE — ED PROVIDER NOTE - OBJECTIVE STATEMENT
74 yo M pmhx RCC s/p L nephrectomy, HTN, CAD, HTN presenting to the ED for evaluation of chest tightness and hypertension tonight. pt reporting since earlier today has had midsternal chest pressure/tightness, checked his BP and was 180/100. states he took his valsartan 320mg today. Follows with cardiologist dr rodriguez, last stress test 2022. denies fever, chills, sob, le swelling, calf pain, nausea, vomiting, abd pain

## 2024-02-14 NOTE — ED CDU PROVIDER INITIAL DAY NOTE - OBJECTIVE STATEMENT
72 yo M pmhx RCC s/p L nephrectomy, HTN, CAD, HTN presenting to the ED for evaluation of chest tightness and hypertension tonight. pt reporting since earlier today has had midsternal chest pressure/tightness, checked his BP and was 180/100. states he took his valsartan 320mg today. Follows with cardiologist dr rodriguez, last stress test 2022. denies fever, chills, sob, le swelling, calf pain, nausea, vomiting, abd pain

## 2024-02-14 NOTE — ED PROVIDER NOTE - CLINICAL SUMMARY MEDICAL DECISION MAKING FREE TEXT BOX
EKG non ischemic.  Troponin high sensitivity with no delta x 2 draws.  D/W Cardiology.  Will place in EDOU for telemetry and likely nuclear stress testing.  Stable.

## 2024-02-14 NOTE — ED PROVIDER NOTE - PRO INTERPRETER NEED 2
Occupational Therapy Visit    Visit Type: Daily Treatment Note  Visit: 4  Referring Provider: Jun Hennessy MD  Medical Diagnosis (from order): Diagnosis Information    Diagnosis  840.8 (ICD-9-CM) - S43.432A (ICD-10-CM) - Tear of left glenoid labrum, initial encounter  V45.89 (ICD-9-CM) - Z98.890 (ICD-10-CM) - Post-operative state         SUBJECTIVE                                                                                                               Patient reports pain is lessening, driving and sitting in car difficult with bumps. Patient relays scapular squeeze and control is not improving   Functional Change: Some more active motion with rotation and abduction     Pain / Symptoms  - Pain rating (out of 10): Current: 4     Worker's Compensation Information  Occupation Information  - Current Employer:  [employer]  - Occupation: [employer] technician   - Full Duty Work Demands: heavy (more than 50 lbs)  - standing >50% of the day, lifting overhead, lifting from floor, work overhead, rotational/twisting motions and chest height lifting     Notes  - /: Staci Monreal; Phone Number:  [phone]  - Anticipate Work Status: return to full duty    Job Related Testing  Lift Floor to Waist     - Patient ability: 80  Lift Waist to Chest      - Patient ability: 80  Lift Waist to Overhead      - Patient ability: 20  2 Hand Carry     - Patient ability: 80  Push/Pull     - Patient ability: yes   Sustained Overhead Work     - Patient ability: yes     OBJECTIVE                                                                                                                     Range of Motion (ROM)   (degrees unless noted; active unless noted; norms in ( ); negative=lacking to 0, positive=beyond 0)  Shoulder:    - Flexion (180):        • Left: pain    Passive: 150    - Scaption:        • Left: pain   Passive: 150    - Internal Rotation:        - at 45°:            • Left:   Passive: 80    - External  Rotation:       - at 45°:            • Left: Passive: 85                       Treatment     Therapeutic Exercise  Passive range of motion  To shoulder all ranges as tolerated     Instructed in:  Side lying scapular active motion: retraction, circular, diagonal patterns passive and assisted also completed   Standing shoulder shrugs x5, scapular retraction x5     Further instructed in A/AROM:  Wall ladder crawls with focus on scapular control and short range vs height x5   Instruction and education in healing times, posture importance     Seated low trap isometric x5 x5 sec hold     Cat/cow on wall x8  Supine cane chest press x5 chest press to overhead x5    Wall rotator cuff  Isometrics , flex, Ext, external rotation, internal rotation, x5 with 5 sec holds     Manual Therapy   Tissue mobilization to pecs, delts, upper traps, lats     Side lying scapular mobilizations varied ranges         Skilled input: verbal instruction/cues    Writer verbally educated and received verbal consent for hand placement, positioning of patient, and techniques to be performed today from patient for hand placement and palpation for techniques as described above and how they are pertinent to the patient's plan of care.    Home Exercise Program  Access Code: [email or web address]  URL: [email or web address]  Date: 12/27/2022  Prepared by: Sylvia Lee    Exercises  ? Sidelying Scapular Retraction - 1 x daily - 7 x weekly - 3 sets - 10 reps  ? Seated Scapular Retraction - 1 x daily - 7 x weekly - 3 sets - 10 reps  ? Standing Shoulder Flexion Wall Walk - 3 x daily - 7 x weekly - 1 sets - 3-5 reps  ? Modified Cat Cow at Wall - 3-4 x daily - 7 x weekly - 1 sets - 5-10 reps  ? Shoulder- Low Row Isometric - 1-2 x daily - 7 x weekly - 1 sets - 5 reps - 5 hold             ASSESSMENT                                                                                                            Patient's scapular control remains most limiting  factor with noted scapular winging and difficult with elevation and retraction. Since initial evaluation there is progress being noted with improving scapular control and significant education to patient is being provided on importance of awareness and posture of the shoulder.   Pain/symptoms after session (out of 10): 3  Education:   - Results of above outlined education: Verbalizes understanding    PLAN                                                                                                                           Suggestions for next session as indicated: Progress per plan of care,  Assisted range, supine cane, ladder climb wall washes and control, proprioceptive exercises to scap, pain NS, scapular isometrics, rotator cuff  Isometrics,       Therapy procedure time and total treatment time can be found documented on the Time Entry flowsheet   English

## 2024-02-14 NOTE — ED ADULT NURSE NOTE - OBJECTIVE STATEMENT
Chest tightness since yesterday, hx htn and bypasses. Took bp medication this morning. AO x 4 , speaks in full sentences , no SOB noted .Reports intermittent chest tightness , elevated BP in triage ,  s, denies headache , dizziness , palpitation, abdominal pain.

## 2024-02-15 DIAGNOSIS — R07.9 CHEST PAIN, UNSPECIFIED: ICD-10-CM

## 2024-02-15 LAB
ALBUMIN SERPL ELPH-MCNC: 4.1 G/DL — SIGNIFICANT CHANGE UP (ref 3.5–5.2)
ALP SERPL-CCNC: 72 U/L — SIGNIFICANT CHANGE UP (ref 30–115)
ALT FLD-CCNC: 18 U/L — SIGNIFICANT CHANGE UP (ref 0–41)
ANION GAP SERPL CALC-SCNC: 15 MMOL/L — HIGH (ref 7–14)
AST SERPL-CCNC: 22 U/L — SIGNIFICANT CHANGE UP (ref 0–41)
BASOPHILS # BLD AUTO: 0.01 K/UL — SIGNIFICANT CHANGE UP (ref 0–0.2)
BASOPHILS NFR BLD AUTO: 0.1 % — SIGNIFICANT CHANGE UP (ref 0–1)
BILIRUB SERPL-MCNC: 2 MG/DL — HIGH (ref 0.2–1.2)
BUN SERPL-MCNC: 13 MG/DL — SIGNIFICANT CHANGE UP (ref 10–20)
CALCIUM SERPL-MCNC: 9.9 MG/DL — SIGNIFICANT CHANGE UP (ref 8.4–10.5)
CHLORIDE SERPL-SCNC: 100 MMOL/L — SIGNIFICANT CHANGE UP (ref 98–110)
CO2 SERPL-SCNC: 25 MMOL/L — SIGNIFICANT CHANGE UP (ref 17–32)
CREAT SERPL-MCNC: 0.9 MG/DL — SIGNIFICANT CHANGE UP (ref 0.7–1.5)
EGFR: 90 ML/MIN/1.73M2 — SIGNIFICANT CHANGE UP
EOSINOPHIL # BLD AUTO: 0.03 K/UL — SIGNIFICANT CHANGE UP (ref 0–0.7)
EOSINOPHIL NFR BLD AUTO: 0.4 % — SIGNIFICANT CHANGE UP (ref 0–8)
GLUCOSE SERPL-MCNC: 128 MG/DL — HIGH (ref 70–99)
HCT VFR BLD CALC: 52.2 % — HIGH (ref 42–52)
HGB BLD-MCNC: 17.5 G/DL — SIGNIFICANT CHANGE UP (ref 14–18)
IMM GRANULOCYTES NFR BLD AUTO: 0.4 % — HIGH (ref 0.1–0.3)
LYMPHOCYTES # BLD AUTO: 1.49 K/UL — SIGNIFICANT CHANGE UP (ref 1.2–3.4)
LYMPHOCYTES # BLD AUTO: 18.6 % — LOW (ref 20.5–51.1)
MAGNESIUM SERPL-MCNC: 1.3 MG/DL — LOW (ref 1.8–2.4)
MCHC RBC-ENTMCNC: 31 PG — SIGNIFICANT CHANGE UP (ref 27–31)
MCHC RBC-ENTMCNC: 33.5 G/DL — SIGNIFICANT CHANGE UP (ref 32–37)
MCV RBC AUTO: 92.6 FL — SIGNIFICANT CHANGE UP (ref 80–94)
MONOCYTES # BLD AUTO: 0.8 K/UL — HIGH (ref 0.1–0.6)
MONOCYTES NFR BLD AUTO: 10 % — HIGH (ref 1.7–9.3)
NEUTROPHILS # BLD AUTO: 5.67 K/UL — SIGNIFICANT CHANGE UP (ref 1.4–6.5)
NEUTROPHILS NFR BLD AUTO: 70.5 % — SIGNIFICANT CHANGE UP (ref 42.2–75.2)
NRBC # BLD: 0 /100 WBCS — SIGNIFICANT CHANGE UP (ref 0–0)
PLATELET # BLD AUTO: 193 K/UL — SIGNIFICANT CHANGE UP (ref 130–400)
PMV BLD: 10.4 FL — SIGNIFICANT CHANGE UP (ref 7.4–10.4)
POTASSIUM SERPL-MCNC: 3.2 MMOL/L — LOW (ref 3.5–5)
POTASSIUM SERPL-SCNC: 3.2 MMOL/L — LOW (ref 3.5–5)
PROT SERPL-MCNC: 6.9 G/DL — SIGNIFICANT CHANGE UP (ref 6–8)
RBC # BLD: 5.64 M/UL — SIGNIFICANT CHANGE UP (ref 4.7–6.1)
RBC # FLD: 14.2 % — SIGNIFICANT CHANGE UP (ref 11.5–14.5)
SODIUM SERPL-SCNC: 140 MMOL/L — SIGNIFICANT CHANGE UP (ref 135–146)
TROPONIN T, HIGH SENSITIVITY RESULT: 31 NG/L — HIGH (ref 6–21)
WBC # BLD: 8.03 K/UL — SIGNIFICANT CHANGE UP (ref 4.8–10.8)
WBC # FLD AUTO: 8.03 K/UL — SIGNIFICANT CHANGE UP (ref 4.8–10.8)

## 2024-02-15 PROCEDURE — 99233 SBSQ HOSP IP/OBS HIGH 50: CPT

## 2024-02-15 PROCEDURE — A9500: CPT

## 2024-02-15 PROCEDURE — 93005 ELECTROCARDIOGRAM TRACING: CPT

## 2024-02-15 PROCEDURE — 83735 ASSAY OF MAGNESIUM: CPT

## 2024-02-15 PROCEDURE — 78452 HT MUSCLE IMAGE SPECT MULT: CPT | Mod: MA

## 2024-02-15 PROCEDURE — 93017 CV STRESS TEST TRACING ONLY: CPT

## 2024-02-15 PROCEDURE — 93306 TTE W/DOPPLER COMPLETE: CPT

## 2024-02-15 PROCEDURE — 80048 BASIC METABOLIC PNL TOTAL CA: CPT

## 2024-02-15 PROCEDURE — 80053 COMPREHEN METABOLIC PANEL: CPT

## 2024-02-15 PROCEDURE — 84484 ASSAY OF TROPONIN QUANT: CPT

## 2024-02-15 PROCEDURE — 85025 COMPLETE CBC W/AUTO DIFF WBC: CPT

## 2024-02-15 PROCEDURE — 36415 COLL VENOUS BLD VENIPUNCTURE: CPT

## 2024-02-15 PROCEDURE — 93010 ELECTROCARDIOGRAM REPORT: CPT

## 2024-02-15 PROCEDURE — 99223 1ST HOSP IP/OBS HIGH 75: CPT

## 2024-02-15 RX ORDER — PANTOPRAZOLE SODIUM 20 MG/1
40 TABLET, DELAYED RELEASE ORAL
Refills: 0 | Status: DISCONTINUED | OUTPATIENT
Start: 2024-02-15 | End: 2024-02-17

## 2024-02-15 RX ORDER — ASPIRIN/CALCIUM CARB/MAGNESIUM 324 MG
81 TABLET ORAL DAILY
Refills: 0 | Status: DISCONTINUED | OUTPATIENT
Start: 2024-02-15 | End: 2024-02-17

## 2024-02-15 RX ORDER — ASPIRIN/CALCIUM CARB/MAGNESIUM 324 MG
1 TABLET ORAL
Qty: 0 | Refills: 0 | DISCHARGE

## 2024-02-15 RX ORDER — METOPROLOL TARTRATE 50 MG
100 TABLET ORAL
Refills: 0 | Status: DISCONTINUED | OUTPATIENT
Start: 2024-02-15 | End: 2024-02-17

## 2024-02-15 RX ORDER — ATORVASTATIN CALCIUM 80 MG/1
40 TABLET, FILM COATED ORAL AT BEDTIME
Refills: 0 | Status: DISCONTINUED | OUTPATIENT
Start: 2024-02-15 | End: 2024-02-17

## 2024-02-15 RX ORDER — AMLODIPINE BESYLATE 2.5 MG/1
5 TABLET ORAL DAILY
Refills: 0 | Status: DISCONTINUED | OUTPATIENT
Start: 2024-02-15 | End: 2024-02-17

## 2024-02-15 RX ORDER — OMEGA-3 ACID ETHYL ESTERS 1 G
0 CAPSULE ORAL
Qty: 0 | Refills: 0 | DISCHARGE

## 2024-02-15 RX ORDER — VALSARTAN 80 MG/1
1 TABLET ORAL
Qty: 0 | Refills: 0 | DISCHARGE

## 2024-02-15 RX ORDER — DAPAGLIFLOZIN 10 MG/1
10 TABLET, FILM COATED ORAL EVERY 24 HOURS
Refills: 0 | Status: DISCONTINUED | OUTPATIENT
Start: 2024-02-15 | End: 2024-02-17

## 2024-02-15 RX ORDER — AMLODIPINE BESYLATE 2.5 MG/1
5 TABLET ORAL ONCE
Refills: 0 | Status: COMPLETED | OUTPATIENT
Start: 2024-02-15 | End: 2024-02-15

## 2024-02-15 RX ORDER — OMEPRAZOLE 10 MG/1
1 CAPSULE, DELAYED RELEASE ORAL
Refills: 0 | DISCHARGE

## 2024-02-15 RX ORDER — VALSARTAN 80 MG/1
320 TABLET ORAL DAILY
Refills: 0 | Status: DISCONTINUED | OUTPATIENT
Start: 2024-02-15 | End: 2024-02-17

## 2024-02-15 RX ORDER — VALSARTAN 80 MG/1
320 TABLET ORAL DAILY
Refills: 0 | Status: DISCONTINUED | OUTPATIENT
Start: 2024-02-15 | End: 2024-02-15

## 2024-02-15 RX ORDER — ALLOPURINOL 300 MG
300 TABLET ORAL DAILY
Refills: 0 | Status: DISCONTINUED | OUTPATIENT
Start: 2024-02-15 | End: 2024-02-17

## 2024-02-15 RX ORDER — ENOXAPARIN SODIUM 100 MG/ML
40 INJECTION SUBCUTANEOUS EVERY 24 HOURS
Refills: 0 | Status: DISCONTINUED | OUTPATIENT
Start: 2024-02-15 | End: 2024-02-17

## 2024-02-15 RX ORDER — DAPAGLIFLOZIN 10 MG/1
1 TABLET, FILM COATED ORAL
Refills: 0 | DISCHARGE

## 2024-02-15 RX ADMIN — AMLODIPINE BESYLATE 5 MILLIGRAM(S): 2.5 TABLET ORAL at 18:09

## 2024-02-15 RX ADMIN — ATORVASTATIN CALCIUM 40 MILLIGRAM(S): 80 TABLET, FILM COATED ORAL at 21:58

## 2024-02-15 RX ADMIN — VALSARTAN 320 MILLIGRAM(S): 80 TABLET ORAL at 08:48

## 2024-02-15 NOTE — ED CDU PROVIDER SUBSEQUENT DAY NOTE - CONSIDERATION OF ADMISSION OBSERVATION
Consideration of Admission/Observation Pt with extensive cardiac hx with symptoms similar to prior, requires telemetry, serial EKG, provocative testing

## 2024-02-15 NOTE — CONSULT NOTE ADULT - ASSESSMENT
72 yo M with PMHx of CAD/s/p CABG  (last in 7/15/2020 Dr. Key, 5 vessels),  HTN, HLD, chronic back pain, BPH, GERD, gout, and renal cell carcinoma s/p partial left nephrectomy, presenting to the ED for evaluation of chest tightness and hypertension.  pt with abnormal but flat troponin, able axis on ekg and chest tightness.  pt had elevated bp and adenosine stress was put on hold  echo eval ef  adenosine nuclear stress  increase toprol to 100 q12 which was home dose  norasc 5 mgs po q24 standing  cont other meds  discussed with staff

## 2024-02-15 NOTE — ED CDU PROVIDER SUBSEQUENT DAY NOTE - PROGRESS NOTE DETAILS
Patient reportedly hypertensive to 200s/90s, sent back from stress before testing. Patient never received AM home meds, will order STAT and resend after.

## 2024-02-15 NOTE — ED CDU PROVIDER SUBSEQUENT DAY NOTE - PHYSICAL EXAMINATION
Vital Signs: I have reviewed the initial vital signs.  Constitutional: well-nourished, appears stated age, no acute distress.  HEENT: Airway patent, moist MM, EOMI,   CV: regular rate, regular rhythm, well-perfused extremities,   Lungs: Clear to ascultation bilaterally, no increased work of breathing.  ABD: Non-tender, Non-distended,   MSK: Neck supple, nontender, normal range of motion,   INTEG: Skin warm, dry, no rash.  NEURO: A&Ox3, moving all extremities, normal speech

## 2024-02-15 NOTE — H&P ADULT - NSHPLABSRESULTS_GEN_ALL_CORE
LABS:                          17.5   8.03  )-----------( 193      ( 15 Feb 2024 21:05 )             52.2     02-14    143  |  102  |  16  ----------------------------<  128<H>  3.9   |  27  |  0.8    Ca    9.6      14 Feb 2024 20:03    TPro  6.7  /  Alb  4.2  /  TBili  1.2  /  DBili  x   /  AST  22  /  ALT  18  /  AlkPhos  75  02-14    LIVER FUNCTIONS - ( 14 Feb 2024 20:03 )  Alb: 4.2 g/dL / Pro: 6.7 g/dL / ALK PHOS: 75 U/L / ALT: 18 U/L / AST: 22 U/L / GGT: x             Urinalysis Basic - ( 14 Feb 2024 20:03 )    Color: x / Appearance: x / SG: x / pH: x  Gluc: 128 mg/dL / Ketone: x  / Bili: x / Urobili: x   Blood: x / Protein: x / Nitrite: x   Leuk Esterase: x / RBC: x / WBC x   Sq Epi: x / Non Sq Epi: x / Bacteria: x

## 2024-02-15 NOTE — CONSULT NOTE ADULT - SUBJECTIVE AND OBJECTIVE BOX
Patient is a 73y old  Male who presents with a chief complaint of     HPI:  72 yo M with PMHx of CAD/s/p CABG  (last in 7/15/2020 Dr. Key, 5 vessels),  HTN, HLD, chronic back pain, BPH, GERD, gout, and renal cell carcinoma s/p partial left nephrectomy, presenting to the ED for evaluation of chest tightness and hypertension. Pt reporting since wednesday after having a slice of pizza he started having midsternal chest pressure/tightness similar to he had an MI in 2020, checked his BP and was 180/100. Patient was in the observation unit, plan was to do a stress test but during stress test, SBP increased to 220s so it was discontinued and rescheduled for tomorrow. Follows with cardiologist dr rodriguez, last stress test 2022. denies fever, chills, sob, le swelling, calf pain, nausea, vomiting, abd pain.    In ED:  - Vital signs were significant for BP: 221/ 100, HR: 97  - Labs were significant for troponin 26 ( repeat 30 ), Pro-BNP: 306  - EKG: No acute ischemic changes but new rt superior axis change   - Chest X-ray: Right basilar atelectasis. (15 Feb 2024 21:02)      PAST MEDICAL & SURGICAL HISTORY:  Renal cancer, left  Renal cell carcinoma s/p partial left nephrectomy (20% removed)      Gout      HTN (hypertension)      Back pain      GERD (gastroesophageal reflux disease)      H/O partial nephrectomy  9/17/2009 by Dr. Vergara      H/O cystoscopy  TURP 7/27/2005 by Dr. Vanessa Alonso's tumor  Removed 2/3/2006 by Dr. Prieto      H/O laminectomy  L3/L4 9/11/2002 by Dr. Jara      H/O spinal fusion  L4/5-L5/S1 6/23/2008 by Dr. Jara with revision of fusion Transome Axial JF 3/1/2020 by Dr. Jara      Bladder polyp  S/p removal 4/4/2019 by Dr. Wylie      S/P CABG x 5  7/15/2020 Dr. Key , 5 vessel          PREVIOUS DIAGNOSTIC TESTING:      ECHO  FINDINGS:    STRESS TEST  FINDINGS:    CATHETERIZATION  FINDINGS:    MEDICATIONS  (STANDING):  allopurinol 300 milliGRAM(s) Oral daily  amLODIPine   Tablet 5 milliGRAM(s) Oral daily  aspirin  chewable 81 milliGRAM(s) Oral daily  atorvastatin 40 milliGRAM(s) Oral at bedtime  dapagliflozin 10 milliGRAM(s) Oral every 24 hours  enoxaparin Injectable 40 milliGRAM(s) SubCutaneous every 24 hours  metoprolol tartrate 100 milliGRAM(s) Oral two times a day  pantoprazole    Tablet 40 milliGRAM(s) Oral before breakfast  regadenoson Injectable 0.4 milliGRAM(s) IV Push once  valsartan 320 milliGRAM(s) Oral daily    MEDICATIONS  (PRN):      FAMILY HISTORY:  Family history of CVA (Father)    FH: glaucoma        SOCIAL HISTORY:  CIGARETTES:stopped 2002  ALCOHOL:social  DRUGS:none                      REVIEW OF SYSTEMS:  CONSTITUTIONAL: No distress, Looks stable  NECK: No pain or stiffnes  RESPIRATORY: No cough, wheezing, shortness of breath  CARDIOVASCULAR: (+) chest pain, SOB, palpitations, leg swelling  GASTROINTESTINAL: No abdominal or epigastric pain. No nausea, vomiting, or hematemesis;  No melena.  NEUROLOGICAL: No dizziness, headaches, memory loss, loss of strength  SKIN: No itching, burning, rashes, or lesions   ENDOCRINE: No heat or cold intolerance  MUSCULOSKELETAL: No joint pain, No  swelling; No muscle pain  PSYCHIATRIC: No depression, anxiety, mood swings, or difficulty sleeping  ALLERGY: No hives, itching, rash          Vital Signs Last 24 Hrs  T(C): 36.8 (15 Feb 2024 22:01), Max: 36.9 (15 Feb 2024 07:40)  T(F): 98.3 (15 Feb 2024 22:01), Max: 98.4 (15 Feb 2024 07:40)  HR: 105 (15 Feb 2024 22:01) (75 - 105)  BP: 144/86 (15 Feb 2024 22:01) (144/86 - 199/105)  BP(mean): 144 (15 Feb 2024 14:55) (83 - 144)  RR: 19 (15 Feb 2024 22:01) (17 - 19)  SpO2: 95% (15 Feb 2024 22:01) (95% - 97%)    Parameters below as of 15 Feb 2024 22:01  Patient On (Oxygen Delivery Method): room air                          PHYSICAL EXAM:  GENERAL: No distress, well developed  HEAD:  Atraumatic, Normocephalic  NECK: Supple, No JVD, No Bruit of either carotid arteries  NERVOUS SYSTEM:  Alert, Awake, Oriented to time, place, person; Normal memory and speech; Normal motor Strength 5/5 B/L upper and lower extremities  CHEST/LUNG: Normal air entry to lung base bilaterally; No wheeze, crackle, rales, rhonchi  HEART: Regular heart beat, S1, A2, P2, No S3, No S4, No gallop, No murmur  ABDOMEN: Soft, Non tender, Non distended; Bowel sounds present  EXTREMITIES:  2+ Peripheral Pulses, No clubbing, No edema  SKIN: No rashes or lesions    TELEMETRY:nsr    ECG:Diagnosis Line Sinus rhythm with occasional Premature ventricular complexes  Right superior axis deviation  Inferior infarct , age undetermined  Anterior infarct , age undetermined  Abnormal ECG      I&O's Detail      LABS:                        17.5   8.03  )-----------( 193      ( 15 Feb 2024 21:05 )             52.2     02-15    140  |  100  |  13  ----------------------------<  128<H>  3.2<L>   |  25  |  0.9    Ca    9.9      15 Feb 2024 21:05  Mg     1.3     02-15    TPro  6.9  /  Alb  4.1  /  TBili  2.0<H>  /  DBili  x   /  AST  22  /  ALT  18  /  AlkPhos  72  02-15          Urinalysis Basic - ( 15 Feb 2024 21:05 )    Color: x / Appearance: x / SG: x / pH: x  Gluc: 128 mg/dL / Ketone: x  / Bili: x / Urobili: x   Blood: x / Protein: x / Nitrite: x   Leuk Esterase: x / RBC: x / WBC x   Sq Epi: x / Non Sq Epi: x / Bacteria: x      I&O's Summary      RADIOLOGY & ADDITIONAL STUDIES:

## 2024-02-15 NOTE — H&P ADULT - HISTORY OF PRESENT ILLNESS
74 yo M with PMHx of CAD/s/p CABG  (last in 7/15/2020 Dr. Key, 5 vessels),  HTN, HLD, chronic back pain, BPH, GERD, gout, and renal cell carcinoma s/p partial left nephrectomy, presenting to the ED for evaluation of chest tightness and hypertension. Pt reporting since earlier today has had midsternal chest pressure/tightness, checked his BP and was 180/100. states he took his valsartan 320 mg today. Follows with cardiologist dr rodriguez, last stress test 2022. denies fever, chills, sob, le swelling, calf pain, nausea, vomiting, abd pain.    In ED:  - Vital signs were significant for BP: 221/ 100, HR: 97  - Labs were significant for troponin 26 ( repeat 30 ), Pro-BNP: 306  - EKG: No acute ischemic changes   - Chest X-ray: Right basilar atelectasis. 74 yo M with PMHx of CAD/s/p CABG  (last in 7/15/2020 Dr. Key, 5 vessels),  HTN, HLD, chronic back pain, BPH, GERD, gout, and renal cell carcinoma s/p partial left nephrectomy, presenting to the ED for evaluation of chest tightness and hypertension. Pt reporting since wednesday after having a slice of pizza he started having midsternal chest pressure/tightness similar to he had an MI in 2020, checked his BP and was 180/100. Patient was in the observation unit, plan was to do a stress test but during stress test, SBP increased to 220s so it was discontinued and rescheduled for tomorrow. Follows with cardiologist dr rodriguez, last stress test 2022. denies fever, chills, sob, le swelling, calf pain, nausea, vomiting, abd pain.    In ED:  - Vital signs were significant for BP: 221/ 100, HR: 97  - Labs were significant for troponin 26 ( repeat 30 ), Pro-BNP: 306  - EKG: No acute ischemic changes   - Chest X-ray: Right basilar atelectasis.

## 2024-02-15 NOTE — H&P ADULT - NSHPPHYSICALEXAM_GEN_ALL_CORE
GENERAL: Well-nourished, Well-developed. NAD.  	HEAD: No visible or palpable bumps or hematomas. No ecchymosis behind ears B/L.  	Eyes: PERRLA, EOMI.  	ENMT: MMM.   	CVS: Normal S1,S2. No murmurs appreciated on auscultation   	RESP: No use of accessory muscles. Chest rise symmetrical with good expansion. Lungs clear to auscultation B/L. No wheezing, rales, or rhonchi auscultated.  	GI: Normal auscultation of bowel sounds in all 4 quadrants. Soft, Nontender, Nondistended. No guarding or rebound tenderness. No CVAT B/L.  	Skin: Warm, Dry. No rashes or lesions. Good cap refill < 2 sec B/L.  EXT: Radial and pedal pulses present B/L. No calf tenderness or swelling B/L. No palpable cords. No pedal edema B/L. GENERAL: Well-nourished, Well-developed. NAD.  HEAD: No visible or palpable bumps or hematomas. No ecchymosis behind ears B/L.  Eyes: PERRLA, EOMI.  ENMT: MMM.   CVS: Normal S1,S2. No murmurs appreciated on auscultation   RESP: No use of accessory muscles. Chest rise symmetrical with good expansion. Lungs clear to auscultation B/L. No wheezing, rales, or rhonchi auscultated.  GI: Normal auscultation of bowel sounds in all 4 quadrants. Soft, Nontender, Nondistended. No guarding or rebound tenderness. No CVAT B/L.  Skin: Warm, Dry. No rashes or lesions. Good cap refill < 2 sec B/L.  EXT: Radial and pedal pulses present B/L. No calf tenderness or swelling B/L. No palpable cords. No pedal edema B/L.

## 2024-02-15 NOTE — H&P ADULT - ASSESSMENT
74 yo M with PMHx of CAD/s/p CABG  (last in 7/15/2020 Dr. Key, 5 vessels),  HTN, HLD, chronic back pain, BPH, GERD, gout, and renal cell carcinoma s/p partial left nephrectomy, presenting to the ED for evaluation of chest tightness and hypertension. Pt reporting since wednesday after having a slice of pizza he started having midsternal chest pressure/tightness similar to he had an MI in 2020, checked his BP and was 180/100. Patient was in the observation unit, plan was to do a stress test but during stress test, SBP increased to 220s so it was discontinued and rescheduled for tomorrow. Follows with cardiologist dr fernandes, last stress test 2022. denies fever, chills, sob, le swelling, calf pain, nausea, vomiting, abd pain.    # Chest pain, likely unstable angina vs GERD   # Hx of CAD s/p CABG in 2002   # HTN/ HLD   - Chest tightness started after eating a pizza on wednesday   - Not reproducible by palpation or movement   - BP: 221/ 100, HR: 97 on admission   - troponin 26 ( repeat 30 ), Pro-BNP: 306  - EKG: no acute ischemic changes   - Chest X-ray: Right basilar atelectasis    Plan:  - Cardiology consult Dr. Fernandes   - Stress test in am   - C/w atorvastatin, Aspirin   - C/w Valsartan, Metoprolol   - Repeat troponin and EKG in am     # GERD  - C/w PPI     # GOUT  - C/w Allopurinol     # RCC s/p Nephrectomy  - Follows with Dr. Sylvia Arce  - C/w Farxiga     #Misc  - DVT ppx: Lovenox  - GI PPX: PPI  - Diet: DASH      74 yo M with PMHx of CAD/s/p CABG  (last in 7/15/2020 Dr. Key, 5 vessels),  HTN, HLD, chronic back pain, BPH, GERD, gout, and renal cell carcinoma s/p partial left nephrectomy, presenting to the ED for evaluation of chest tightness and hypertension. Pt reporting since wednesday after having a slice of pizza he started having midsternal chest pressure/tightness similar to he had an MI in 2020, checked his BP and was 180/100. Patient was in the observation unit, plan was to do a stress test but during stress test, SBP increased to 220s so it was discontinued and rescheduled for tomorrow. Follows with cardiologist dr fernandes, last stress test 2022. denies fever, chills, sob, le swelling, calf pain, nausea, vomiting, abd pain.    # Chest pain, likely unstable angina vs GERD   # Hx of CAD s/p CABG in 2002   # HTN/ HLD   # Hypertensive urgency   - Chest tightness started after eating a pizza on wednesday   - Not reproducible by palpation or movement   - BP: 221/ 100, HR: 97 on admission   - troponin 26 ( repeat 30 ), Pro-BNP: 306  - EKG: no acute ischemic changes   - Chest X-ray: Right basilar atelectasis    Plan:  - Cardiology consult Dr. Fernandes   - Stress test in am   - C/w atorvastatin, Aspirin   - C/w Valsartan, Metoprolol   - Repeat troponin and EKG in am     # GERD  - C/w PPI     # GOUT  - C/w Allopurinol     # RCC s/p Nephrectomy  - Follows with Dr. Sylvia Arce  - C/w Farxiga     #Misc  - DVT ppx: Lovenox  - GI PPX: PPI  - Diet: DASH

## 2024-02-15 NOTE — H&P ADULT - ATTENDING COMMENTS
Patient seen and examined at bedside independently of the residents. I read the resident's note and agree with the plan with the additions and corrections as noted below. My note supersedes the resident's note.     REVIEW OF SYSTEMS:  Negative except in HPI.     PMH: CAD s/p CABG x 5 (last in ), HTN, HLD, GERD, BPH, Renal cell carcinoma s/p Left nephrectomy    FHx: Reviewed. No fhx of asthma/copd, No fhx of liver and pulmonary disease. No fhx of hematological disorder.     Physical Exam:  GEN: No acute distress. Awake, Alert and oriented x 3.   Head: Atraumatic, Normocephalic.   Eye: PEERLA. No sclera icterus. EOMI.   ENT: Normal oropharynx, no thyromegaly, no mass, no lymphadenopathy.   LUNGS: Clear to auscultation bilaterally. No wheeze/rales/crackles.   HEART: Normal. S1/S2 present. RRR. No murmur/gallops.   ABD: Soft, non-tender, non-distended. Bowel sounds present.   EXT: No pitting edema. No erythema. No tenderness.  Integumentary: No rash, No sore, No petechia.   NEURO: CN III-XII intact. Strength: 5/5 b/l ULE. Sensory intact b/l ULE.     Vital Signs Last 24 Hrs  T(C): 36.8 (15 Feb 2024 22:01), Max: 36.9 (15 Feb 2024 07:40)  T(F): 98.3 (15 Feb 2024 22:01), Max: 98.4 (15 Feb 2024 07:40)  HR: 105 (15 Feb 2024 22:01) (75 - 105)  BP: 144/86 (15 Feb 2024 22:01) (144/86 - 199/105)  BP(mean): 144 (15 Feb 2024 14:55) (83 - 144)  RR: 19 (15 Feb 2024 22:01) (17 - 19)  SpO2: 95% (15 Feb 2024 22:) (95% - 97%)    Parameters below as of 15 Feb 2024 22:01  Patient On (Oxygen Delivery Method): room air      Please see the above notes for Labs and radiology.     Assessment and Plan:     74 yo M with hx of CAD s/p CABG x 5 (last in ), HTN, HLD, GERD, BPH, Renal cell carcinoma s/p Left nephrectomy presents to ED for evaluation for chest tightness and elevated BP.    Chest pain  - ACS ruled out on admission.   - Troponin 26 -> 30 -> 31  - c/w ASA, statin and BB   - check TTE  - NPO after MN for NST in AM.   - monitor on Telemetry.   - Cardiology consult (Dr. Gonzalez).     HTN urgency (resolved)  - c/w home med.     CAD s/p CABG - c/w ASA, statin and BB   BPH - c/w home med.   RCC s/p left nephrectomy - f/u outpatient.   GERD - PPI     DVT ppx: Lovenox SC  GI ppx:  PPI  Diet: DASH diet. NPO after MN  Activity: as tolerated.     Date seen by the attendin/15/2024  Total time spent: 75 minutes.

## 2024-02-15 NOTE — ED ADULT NURSE REASSESSMENT NOTE - NS ED NURSE REASSESS COMMENT FT1
Patient received from previous RN, pt hypertensive denies chest pain, dizziness, blurry vision, numbness or tingling. M.A on call bell in reach safety and comfort measures maintained

## 2024-02-15 NOTE — ED CDU PROVIDER SUBSEQUENT DAY NOTE - HISTORY
73-year-old male history of renal cell status post left nephrectomy, hypertension, CAD, hypertension presenting for evaluation of chest tightness and hypertension since earlier today.  Follows with cardiologist Dr. Fernandes.  No issues overnight.

## 2024-02-15 NOTE — ED CDU PROVIDER SUBSEQUENT DAY NOTE - CLINICAL SUMMARY MEDICAL DECISION MAKING FREE TEXT BOX
73-year-old man, history of RCC status post nephrectomy, hypertension, CAD status post CABG in 2020 x 5 vessels was placed in CDU for evaluation of chest tightness and elevated blood pressure that began right after he ate a slice of pizza.  He would not be concerned except that during his MI in 2020, he also had symptoms just after eating.  ED workup was unremarkable.  Patient is comfortable and asymptomatic on my eval.  Lungs clear, CV S1-S2, abdomen soft, nontender.  EKG unchanged, troponin reassuring.  Plan was for nuclear stress test, but patient with elevated blood pressure in the stress lab, so it could not be completed in office.  Patient was evaluated at the bedside by his cardiologist Dr. Fernandes, who recommended admission for inpatient workup and management of blood pressure.  Patient is amenable.

## 2024-02-16 LAB
ALBUMIN SERPL ELPH-MCNC: 3.9 G/DL — SIGNIFICANT CHANGE UP (ref 3.5–5.2)
ALP SERPL-CCNC: 70 U/L — SIGNIFICANT CHANGE UP (ref 30–115)
ALT FLD-CCNC: 18 U/L — SIGNIFICANT CHANGE UP (ref 0–41)
ANION GAP SERPL CALC-SCNC: 15 MMOL/L — HIGH (ref 7–14)
ANION GAP SERPL CALC-SCNC: 18 MMOL/L — HIGH (ref 7–14)
AST SERPL-CCNC: 21 U/L — SIGNIFICANT CHANGE UP (ref 0–41)
BASOPHILS # BLD AUTO: 0.02 K/UL — SIGNIFICANT CHANGE UP (ref 0–0.2)
BASOPHILS NFR BLD AUTO: 0.3 % — SIGNIFICANT CHANGE UP (ref 0–1)
BILIRUB SERPL-MCNC: 2.1 MG/DL — HIGH (ref 0.2–1.2)
BUN SERPL-MCNC: 13 MG/DL — SIGNIFICANT CHANGE UP (ref 10–20)
BUN SERPL-MCNC: 14 MG/DL — SIGNIFICANT CHANGE UP (ref 10–20)
CALCIUM SERPL-MCNC: 9.5 MG/DL — SIGNIFICANT CHANGE UP (ref 8.4–10.4)
CALCIUM SERPL-MCNC: 9.6 MG/DL — SIGNIFICANT CHANGE UP (ref 8.4–10.5)
CHLORIDE SERPL-SCNC: 100 MMOL/L — SIGNIFICANT CHANGE UP (ref 98–110)
CHLORIDE SERPL-SCNC: 102 MMOL/L — SIGNIFICANT CHANGE UP (ref 98–110)
CO2 SERPL-SCNC: 24 MMOL/L — SIGNIFICANT CHANGE UP (ref 17–32)
CO2 SERPL-SCNC: 24 MMOL/L — SIGNIFICANT CHANGE UP (ref 17–32)
CREAT SERPL-MCNC: 0.9 MG/DL — SIGNIFICANT CHANGE UP (ref 0.7–1.5)
CREAT SERPL-MCNC: 0.9 MG/DL — SIGNIFICANT CHANGE UP (ref 0.7–1.5)
EGFR: 90 ML/MIN/1.73M2 — SIGNIFICANT CHANGE UP
EGFR: 90 ML/MIN/1.73M2 — SIGNIFICANT CHANGE UP
EOSINOPHIL # BLD AUTO: 0.07 K/UL — SIGNIFICANT CHANGE UP (ref 0–0.7)
EOSINOPHIL NFR BLD AUTO: 1 % — SIGNIFICANT CHANGE UP (ref 0–8)
GLUCOSE SERPL-MCNC: 111 MG/DL — HIGH (ref 70–99)
GLUCOSE SERPL-MCNC: 122 MG/DL — HIGH (ref 70–99)
HCT VFR BLD CALC: 50.5 % — SIGNIFICANT CHANGE UP (ref 42–52)
HGB BLD-MCNC: 17.2 G/DL — SIGNIFICANT CHANGE UP (ref 14–18)
IMM GRANULOCYTES NFR BLD AUTO: 0.3 % — SIGNIFICANT CHANGE UP (ref 0.1–0.3)
LYMPHOCYTES # BLD AUTO: 1.24 K/UL — SIGNIFICANT CHANGE UP (ref 1.2–3.4)
LYMPHOCYTES # BLD AUTO: 17.4 % — LOW (ref 20.5–51.1)
MAGNESIUM SERPL-MCNC: 1.5 MG/DL — LOW (ref 1.8–2.4)
MAGNESIUM SERPL-MCNC: 1.9 MG/DL — SIGNIFICANT CHANGE UP (ref 1.8–2.4)
MCHC RBC-ENTMCNC: 31.6 PG — HIGH (ref 27–31)
MCHC RBC-ENTMCNC: 34.1 G/DL — SIGNIFICANT CHANGE UP (ref 32–37)
MCV RBC AUTO: 92.7 FL — SIGNIFICANT CHANGE UP (ref 80–94)
MONOCYTES # BLD AUTO: 0.61 K/UL — HIGH (ref 0.1–0.6)
MONOCYTES NFR BLD AUTO: 8.6 % — SIGNIFICANT CHANGE UP (ref 1.7–9.3)
NEUTROPHILS # BLD AUTO: 5.15 K/UL — SIGNIFICANT CHANGE UP (ref 1.4–6.5)
NEUTROPHILS NFR BLD AUTO: 72.4 % — SIGNIFICANT CHANGE UP (ref 42.2–75.2)
NRBC # BLD: 0 /100 WBCS — SIGNIFICANT CHANGE UP (ref 0–0)
PLATELET # BLD AUTO: 177 K/UL — SIGNIFICANT CHANGE UP (ref 130–400)
PMV BLD: 10.5 FL — HIGH (ref 7.4–10.4)
POTASSIUM SERPL-MCNC: 3.3 MMOL/L — LOW (ref 3.5–5)
POTASSIUM SERPL-MCNC: 4 MMOL/L — SIGNIFICANT CHANGE UP (ref 3.5–5)
POTASSIUM SERPL-SCNC: 3.3 MMOL/L — LOW (ref 3.5–5)
POTASSIUM SERPL-SCNC: 4 MMOL/L — SIGNIFICANT CHANGE UP (ref 3.5–5)
PROT SERPL-MCNC: 6.4 G/DL — SIGNIFICANT CHANGE UP (ref 6–8)
RBC # BLD: 5.45 M/UL — SIGNIFICANT CHANGE UP (ref 4.7–6.1)
RBC # FLD: 14.3 % — SIGNIFICANT CHANGE UP (ref 11.5–14.5)
SODIUM SERPL-SCNC: 141 MMOL/L — SIGNIFICANT CHANGE UP (ref 135–146)
SODIUM SERPL-SCNC: 142 MMOL/L — SIGNIFICANT CHANGE UP (ref 135–146)
TROPONIN T, HIGH SENSITIVITY RESULT: 33 NG/L — HIGH (ref 6–21)
TROPONIN T, HIGH SENSITIVITY RESULT: 36 NG/L — HIGH (ref 6–21)
TROPONIN T, HIGH SENSITIVITY RESULT: 38 NG/L — HIGH (ref 6–21)
TROPONIN T, HIGH SENSITIVITY RESULT: 43 NG/L — HIGH (ref 6–21)
WBC # BLD: 7.11 K/UL — SIGNIFICANT CHANGE UP (ref 4.8–10.8)
WBC # FLD AUTO: 7.11 K/UL — SIGNIFICANT CHANGE UP (ref 4.8–10.8)

## 2024-02-16 PROCEDURE — 99233 SBSQ HOSP IP/OBS HIGH 50: CPT

## 2024-02-16 PROCEDURE — 93010 ELECTROCARDIOGRAM REPORT: CPT

## 2024-02-16 RX ORDER — MAGNESIUM SULFATE 500 MG/ML
2 VIAL (ML) INJECTION ONCE
Refills: 0 | Status: COMPLETED | OUTPATIENT
Start: 2024-02-16 | End: 2024-02-16

## 2024-02-16 RX ORDER — POTASSIUM CHLORIDE 20 MEQ
20 PACKET (EA) ORAL
Refills: 0 | Status: COMPLETED | OUTPATIENT
Start: 2024-02-16 | End: 2024-02-16

## 2024-02-16 RX ADMIN — Medication 25 GRAM(S): at 08:15

## 2024-02-16 RX ADMIN — ATORVASTATIN CALCIUM 40 MILLIGRAM(S): 80 TABLET, FILM COATED ORAL at 21:34

## 2024-02-16 RX ADMIN — Medication 81 MILLIGRAM(S): at 11:39

## 2024-02-16 RX ADMIN — Medication 100 MILLIGRAM(S): at 18:27

## 2024-02-16 RX ADMIN — VALSARTAN 320 MILLIGRAM(S): 80 TABLET ORAL at 05:50

## 2024-02-16 RX ADMIN — ENOXAPARIN SODIUM 40 MILLIGRAM(S): 100 INJECTION SUBCUTANEOUS at 05:50

## 2024-02-16 RX ADMIN — Medication 50 MILLIEQUIVALENT(S): at 09:50

## 2024-02-16 RX ADMIN — Medication 100 MILLIGRAM(S): at 05:50

## 2024-02-16 RX ADMIN — AMLODIPINE BESYLATE 5 MILLIGRAM(S): 2.5 TABLET ORAL at 05:50

## 2024-02-16 RX ADMIN — Medication 50 MILLIEQUIVALENT(S): at 11:39

## 2024-02-16 RX ADMIN — PANTOPRAZOLE SODIUM 40 MILLIGRAM(S): 20 TABLET, DELAYED RELEASE ORAL at 06:58

## 2024-02-16 RX ADMIN — DAPAGLIFLOZIN 10 MILLIGRAM(S): 10 TABLET, FILM COATED ORAL at 05:50

## 2024-02-16 RX ADMIN — Medication 300 MILLIGRAM(S): at 11:39

## 2024-02-16 NOTE — PROGRESS NOTE ADULT - ASSESSMENT
74 yo M with PMHx of CAD/s/p CABG  (last in 7/15/2020 Dr. Key, 5 vessels),  HTN, HLD, chronic back pain, BPH, GERD, gout, and renal cell carcinoma s/p partial left nephrectomy, presenting to the ED for evaluation of chest tightness and hypertension. Pt reporting since wednesday after having a slice of pizza he started having midsternal chest pressure/tightness similar to he had an MI in 2020, checked his BP and was 180/100. Patient was in the observation unit, plan was to do a stress test but during stress test, SBP increased to 220s so it was discontinued and rescheduled for tomorrow. Follows with cardiologist dr fernandes, last stress test 2022. denies fever, chills, sob, le swelling, calf pain, nausea, vomiting, abd pain.    # Chest pain, likely unstable angina vs GERD   # Hx of CAD s/p CABG in 2002   # HTN/ HLD   # Hypertensive urgency   - Chest tightness started after eating a pizza on wednesday   - Not reproducible by palpation or movement   - BP: 221/ 100, HR: 97 on admission --> resolved after restarting home meds  - troponin 26 ( repeat 30 )-->33-->43  - Pro-BNP: 306  - EKG: no acute ischemic changes   - Chest X-ray: Right basilar atelectasis  - Cardiology consult Dr. Fernandes : Stress test this AM but test was cancelled due to rising tropes. for now instructed to trend to peak and then consider stress test vs cath? Cardiology follow up required  - C/w atorvastatin, Aspirin   - C/w Valsartan, Metoprolol   - Repeat troponin and EKG in am     # GERD  - C/w PPI     # GOUT  - C/w Allopurinol     # RCC s/p Nephrectomy  - Follows with Dr. Sylvia Arce  - C/w Farxiga     #Misc  - DVT ppx: Lovenox  - GI PPX: PPI  - Diet: DASH     #Progress Note Handoff  Pending (specify):  trend trop, cardiology follow up for stress test vs cath?  Family discussion: updated patient  Disposition:  Unknown at this time________

## 2024-02-16 NOTE — PROGRESS NOTE ADULT - SUBJECTIVE AND OBJECTIVE BOX
EUSEBIO MORENO  73y  Male      Patient is a 73y old  Male who presents with a chief complaint of unstable angina (15 Feb 2024 22:42)      INTERVAL HPI/OVERNIGHT EVENTS: no acute events overnight. no chest pain or sob reported.      REVIEW OF SYSTEMS:  CONSTITUTIONAL: No fever, weight loss, or fatigue  RESPIRATORY: No cough, wheezing, chills or hemoptysis; No shortness of breath  CARDIOVASCULAR: No chest pain, palpitations, dizziness, or leg swelling  GASTROINTESTINAL: No abdominal or epigastric pain. No nausea, vomiting, or hematemesis; No diarrhea or constipation. No melena or hematochezia.  GENITOURINARY: No dysuria, frequency, hematuria, or incontinence  NEUROLOGICAL: No headaches, memory loss, loss of strength, numbness, or tremors  SKIN: No itching, burning, rashes, or lesions   MUSCULOSKELETAL: No joint pain or swelling; No muscle, back, or extremity pain  PSYCHIATRIC: No depression, anxiety, mood swings, or difficulty sleeping  All other review of systems negative    T(C): 36.4 (02-16-24 @ 08:28), Max: 36.8 (02-15-24 @ 22:01)  HR: 74 (02-16-24 @ 08:28) (74 - 105)  BP: 137/64 (02-16-24 @ 08:28) (137/64 - 152/74)  RR: 17 (02-16-24 @ 08:28) (17 - 19)  SpO2: 95% (02-16-24 @ 08:28) (95% - 96%)  Wt(kg): --Vital Signs Last 24 Hrs  T(C): 36.4 (16 Feb 2024 08:28), Max: 36.8 (15 Feb 2024 22:01)  T(F): 97.6 (16 Feb 2024 08:28), Max: 98.3 (15 Feb 2024 22:01)  HR: 74 (16 Feb 2024 08:28) (74 - 105)  BP: 137/64 (16 Feb 2024 08:28) (137/64 - 152/74)  BP(mean): --  RR: 17 (16 Feb 2024 08:28) (17 - 19)  SpO2: 95% (16 Feb 2024 08:28) (95% - 96%)    Parameters below as of 16 Feb 2024 08:28  Patient On (Oxygen Delivery Method): room air            PHYSICAL EXAM:  GENERAL: obese, elderly M, NAD, resting comfortably appearing in bed  EYES: anicteric sclera, non injected conjunctiva  PSYCH: no agitation, baseline mentation  NERVOUS SYSTEM:  Alert & Oriented X3  PULMONARY: symmetrical chest rise, no accessory muscle use  CARDIOVASCULAR: non tachycardic  GI:  Nontender   EXTREMITIES:  No clubbing, cyanosis, or edema  SKIN: No rashes or lesions    Consultant(s) Notes Reviewed:  [x ] YES  [ ] NO    Discussed with Consultants/Other Providers [ x] YES     LABS                          17.2   7.11  )-----------( 177      ( 16 Feb 2024 08:29 )             50.5     02-16    142  |  100  |  14  ----------------------------<  122<H>  4.0   |  24  |  0.9    Ca    9.6      16 Feb 2024 11:25  Mg     1.9     02-16    TPro  6.4  /  Alb  3.9  /  TBili  2.1<H>  /  DBili  x   /  AST  21  /  ALT  18  /  AlkPhos  70  02-16      Urinalysis Basic - ( 16 Feb 2024 11:25 )    Color: x / Appearance: x / SG: x / pH: x  Gluc: 122 mg/dL / Ketone: x  / Bili: x / Urobili: x   Blood: x / Protein: x / Nitrite: x   Leuk Esterase: x / RBC: x / WBC x   Sq Epi: x / Non Sq Epi: x / Bacteria: x        RADIOLOGY & ADDITIONAL TESTS:  - no images 2/16  Imaging Personally Reviewed:  [ ] YES  [ ] NO    HEALTH ISSUES - PROBLEM Dx:      MEDICATIONS  (STANDING):  allopurinol 300 milliGRAM(s) Oral daily  amLODIPine   Tablet 5 milliGRAM(s) Oral daily  aspirin  chewable 81 milliGRAM(s) Oral daily  atorvastatin 40 milliGRAM(s) Oral at bedtime  dapagliflozin 10 milliGRAM(s) Oral every 24 hours  enoxaparin Injectable 40 milliGRAM(s) SubCutaneous every 24 hours  metoprolol tartrate 100 milliGRAM(s) Oral two times a day  pantoprazole    Tablet 40 milliGRAM(s) Oral before breakfast  regadenoson Injectable 0.4 milliGRAM(s) IV Push once  valsartan 320 milliGRAM(s) Oral daily    MEDICATIONS  (PRN):

## 2024-02-16 NOTE — PATIENT PROFILE ADULT - FUNCTIONAL ASSESSMENT - BASIC MOBILITY 6.
4-calculated by average/Not able to assess (calculate score using [facility] averaging method)

## 2024-02-16 NOTE — CHART NOTE - NSCHARTNOTEFT_GEN_A_CORE
Pts troponins were rising today in the morning. No active chest pain currently. The pts exercise stress test was cancelled due to the rising troponins. It was recommended to continue trending the troponins untill they are stable. Once stable the pt will undergo stress testing. Pts troponins were rising today in the morning. No active chest pain currently. The pts stress test was cancelled due to the rising troponins. It was recommended to continue trending the troponins untill they are stable. Once stable the pt will undergo stress testing.

## 2024-02-16 NOTE — PATIENT PROFILE ADULT - FALL HARM RISK - HARM RISK INTERVENTIONS

## 2024-02-17 ENCOUNTER — TRANSCRIPTION ENCOUNTER (OUTPATIENT)
Age: 74
End: 2024-02-17

## 2024-02-17 ENCOUNTER — RESULT REVIEW (OUTPATIENT)
Age: 74
End: 2024-02-17

## 2024-02-17 VITALS
DIASTOLIC BLOOD PRESSURE: 76 MMHG | RESPIRATION RATE: 18 BRPM | TEMPERATURE: 98 F | SYSTOLIC BLOOD PRESSURE: 129 MMHG | HEART RATE: 65 BPM

## 2024-02-17 LAB
ALBUMIN SERPL ELPH-MCNC: 4 G/DL — SIGNIFICANT CHANGE UP (ref 3.5–5.2)
ALP SERPL-CCNC: 72 U/L — SIGNIFICANT CHANGE UP (ref 30–115)
ALT FLD-CCNC: 17 U/L — SIGNIFICANT CHANGE UP (ref 0–41)
ANION GAP SERPL CALC-SCNC: 14 MMOL/L — SIGNIFICANT CHANGE UP (ref 7–14)
AST SERPL-CCNC: 21 U/L — SIGNIFICANT CHANGE UP (ref 0–41)
BASOPHILS # BLD AUTO: 0.03 K/UL — SIGNIFICANT CHANGE UP (ref 0–0.2)
BASOPHILS NFR BLD AUTO: 0.4 % — SIGNIFICANT CHANGE UP (ref 0–1)
BILIRUB SERPL-MCNC: 2.1 MG/DL — HIGH (ref 0.2–1.2)
BUN SERPL-MCNC: 15 MG/DL — SIGNIFICANT CHANGE UP (ref 10–20)
CALCIUM SERPL-MCNC: 9.5 MG/DL — SIGNIFICANT CHANGE UP (ref 8.4–10.5)
CHLORIDE SERPL-SCNC: 104 MMOL/L — SIGNIFICANT CHANGE UP (ref 98–110)
CO2 SERPL-SCNC: 24 MMOL/L — SIGNIFICANT CHANGE UP (ref 17–32)
CREAT SERPL-MCNC: 1.1 MG/DL — SIGNIFICANT CHANGE UP (ref 0.7–1.5)
EGFR: 71 ML/MIN/1.73M2 — SIGNIFICANT CHANGE UP
EOSINOPHIL # BLD AUTO: 0.1 K/UL — SIGNIFICANT CHANGE UP (ref 0–0.7)
EOSINOPHIL NFR BLD AUTO: 1.5 % — SIGNIFICANT CHANGE UP (ref 0–8)
GLUCOSE SERPL-MCNC: 136 MG/DL — HIGH (ref 70–99)
HCT VFR BLD CALC: 50.8 % — SIGNIFICANT CHANGE UP (ref 42–52)
HGB BLD-MCNC: 16.9 G/DL — SIGNIFICANT CHANGE UP (ref 14–18)
IMM GRANULOCYTES NFR BLD AUTO: 0.3 % — SIGNIFICANT CHANGE UP (ref 0.1–0.3)
LYMPHOCYTES # BLD AUTO: 1.14 K/UL — LOW (ref 1.2–3.4)
LYMPHOCYTES # BLD AUTO: 16.7 % — LOW (ref 20.5–51.1)
MAGNESIUM SERPL-MCNC: 1.6 MG/DL — LOW (ref 1.8–2.4)
MCHC RBC-ENTMCNC: 30.9 PG — SIGNIFICANT CHANGE UP (ref 27–31)
MCHC RBC-ENTMCNC: 33.3 G/DL — SIGNIFICANT CHANGE UP (ref 32–37)
MCV RBC AUTO: 92.9 FL — SIGNIFICANT CHANGE UP (ref 80–94)
MONOCYTES # BLD AUTO: 0.51 K/UL — SIGNIFICANT CHANGE UP (ref 0.1–0.6)
MONOCYTES NFR BLD AUTO: 7.5 % — SIGNIFICANT CHANGE UP (ref 1.7–9.3)
NEUTROPHILS # BLD AUTO: 5.02 K/UL — SIGNIFICANT CHANGE UP (ref 1.4–6.5)
NEUTROPHILS NFR BLD AUTO: 73.6 % — SIGNIFICANT CHANGE UP (ref 42.2–75.2)
NRBC # BLD: 0 /100 WBCS — SIGNIFICANT CHANGE UP (ref 0–0)
PLATELET # BLD AUTO: 167 K/UL — SIGNIFICANT CHANGE UP (ref 130–400)
PMV BLD: 10.5 FL — HIGH (ref 7.4–10.4)
POTASSIUM SERPL-MCNC: 3.9 MMOL/L — SIGNIFICANT CHANGE UP (ref 3.5–5)
POTASSIUM SERPL-SCNC: 3.9 MMOL/L — SIGNIFICANT CHANGE UP (ref 3.5–5)
PROT SERPL-MCNC: 6.7 G/DL — SIGNIFICANT CHANGE UP (ref 6–8)
RBC # BLD: 5.47 M/UL — SIGNIFICANT CHANGE UP (ref 4.7–6.1)
RBC # FLD: 14.3 % — SIGNIFICANT CHANGE UP (ref 11.5–14.5)
SODIUM SERPL-SCNC: 142 MMOL/L — SIGNIFICANT CHANGE UP (ref 135–146)
WBC # BLD: 6.82 K/UL — SIGNIFICANT CHANGE UP (ref 4.8–10.8)
WBC # FLD AUTO: 6.82 K/UL — SIGNIFICANT CHANGE UP (ref 4.8–10.8)

## 2024-02-17 PROCEDURE — 99239 HOSP IP/OBS DSCHRG MGMT >30: CPT

## 2024-02-17 PROCEDURE — 93018 CV STRESS TEST I&R ONLY: CPT

## 2024-02-17 PROCEDURE — 93306 TTE W/DOPPLER COMPLETE: CPT | Mod: 26

## 2024-02-17 PROCEDURE — 78452 HT MUSCLE IMAGE SPECT MULT: CPT | Mod: 26

## 2024-02-17 PROCEDURE — 93016 CV STRESS TEST SUPVJ ONLY: CPT

## 2024-02-17 RX ORDER — AMLODIPINE BESYLATE 2.5 MG/1
1 TABLET ORAL
Qty: 30 | Refills: 2
Start: 2024-02-17 | End: 2024-05-16

## 2024-02-17 RX ORDER — MAGNESIUM SULFATE 500 MG/ML
2 VIAL (ML) INJECTION
Refills: 0 | Status: DISCONTINUED | OUTPATIENT
Start: 2024-02-17 | End: 2024-02-17

## 2024-02-17 RX ADMIN — VALSARTAN 320 MILLIGRAM(S): 80 TABLET ORAL at 05:59

## 2024-02-17 RX ADMIN — PANTOPRAZOLE SODIUM 40 MILLIGRAM(S): 20 TABLET, DELAYED RELEASE ORAL at 05:59

## 2024-02-17 RX ADMIN — ENOXAPARIN SODIUM 40 MILLIGRAM(S): 100 INJECTION SUBCUTANEOUS at 05:59

## 2024-02-17 RX ADMIN — DAPAGLIFLOZIN 10 MILLIGRAM(S): 10 TABLET, FILM COATED ORAL at 06:04

## 2024-02-17 RX ADMIN — Medication 100 MILLIGRAM(S): at 06:03

## 2024-02-17 RX ADMIN — AMLODIPINE BESYLATE 5 MILLIGRAM(S): 2.5 TABLET ORAL at 05:59

## 2024-02-17 RX ADMIN — Medication 81 MILLIGRAM(S): at 12:21

## 2024-02-17 RX ADMIN — Medication 300 MILLIGRAM(S): at 12:21

## 2024-02-17 NOTE — DISCHARGE NOTE PROVIDER - NSDCCPCAREPLAN_GEN_ALL_CORE_FT
PRINCIPAL DISCHARGE DIAGNOSIS  Diagnosis: Chest pain  Assessment and Plan of Treatment: improved

## 2024-02-17 NOTE — DISCHARGE NOTE PROVIDER - CARE PROVIDER_API CALL
Sree Fernandes  Cardiology  [address] [place] [address], [address] [address]  Phone: [phone]  Fax: [phone]  Follow Up Time: 2 weeks

## 2024-02-17 NOTE — DISCHARGE NOTE PROVIDER - NSDCMRMEDTOKEN_GEN_ALL_CORE_FT
allopurinol 300 mg oral tablet: 1 tab(s) orally once a day  amLODIPine 5 mg oral tablet: 1 tab(s) orally once a day  aspirin 81 mg oral tablet, chewable: 1 tab(s) orally once a day  atorvastatin 40 mg oral tablet: 1 tab(s) orally once a day (at bedtime)  Farxiga 10 mg oral tablet: 1 tab(s) orally once a day  Fish Oil 1200 mg oral capsule: orally once a day  Flax Seed Oil oral capsule: 1200 milligram(s) orally once a day  metoprolol tartrate 100 mg oral tablet: 1 tab(s) orally every 12 hours  PriLOSEC OTC 20 mg oral delayed release tablet: 1 tab(s) orally once a day  valsartan 320 mg oral tablet: 1 tab(s) orally once a day

## 2024-02-17 NOTE — DISCHARGE NOTE NURSING/CASE MANAGEMENT/SOCIAL WORK - PATIENT PORTAL LINK FT
You can access the FollowMyHealth Patient Portal offered by [facility] by registering at the following website: [email or web address]. By joining [email or web address]’s FollowMyHealth portal, you will also be able to view your health information using other applications (apps) compatible with our system.

## 2024-02-17 NOTE — DISCHARGE NOTE PROVIDER - NSDCFUSCHEDAPPT_GEN_ALL_CORE_FT
Cayetano NIELSON  [facility] Physician [facility]  UROLOGY [address] [facility]  Scheduled Appointment: 05/14/2024

## 2024-02-17 NOTE — DISCHARGE NOTE PROVIDER - HOSPITAL COURSE
74 yo M with PMHx of CAD/s/p CABG  (last in 7/15/2020 Dr. Key, 5 vessels),  HTN, HLD, chronic back pain, BPH, GERD, gout, and renal cell carcinoma s/p partial left nephrectomy, presenting to the ED for evaluation of chest tightness and hypertension. Pt reporting since wednesday after having a slice of pizza he started having midsternal chest pressure/tightness similar to he had an MI in 2020, checked his BP and was 180/100. Patient was in the observation unit, plan was to do a stress test but during stress test, SBP increased to 220s , he was admitted to telemetry, serial troponin were stable, No event on telemetry, he underwent nuclear stress test and was negative, echo showed normal LVEF, he was started on Amlodipine,     Hypertensive Urgency:   Chest pain: possibly due to ischemic heart disease vs hypertensive urgency.   CAD s/p CABG in 2002.   Chest tightness started after eating a pizza, Not reproducible by palpation or movement   BP: 221/ 100, HR: 97 on admission  troponin 26 ( repeat 30 )-->33-->43, Pro-BNP: 306  EKG: no acute ischemic changes   Chest X-ray: Right basilar atelectasis  Nuclear stress test was negative for ischemia.   Echo showed normal LVEF 59%, mild MR, mild TR, mild AS.   Continue ASA, Metoprolol and Lipitor.   Cardiology follow up outpatient.   Start on Amlodipine for HTN, continue Valsartan.       # GERD  - C/w PPI     # GOUT  - C/w Allopurinol     # RCC s/p Nephrectomy  - Follows with Dr. Sylvia Arce  - C/w Deysi

## 2024-02-28 DIAGNOSIS — Z98.1 ARTHRODESIS STATUS: ICD-10-CM

## 2024-02-28 DIAGNOSIS — Z95.1 PRESENCE OF AORTOCORONARY BYPASS GRAFT: ICD-10-CM

## 2024-02-28 DIAGNOSIS — M10.9 GOUT, UNSPECIFIED: ICD-10-CM

## 2024-02-28 DIAGNOSIS — I10 ESSENTIAL (PRIMARY) HYPERTENSION: ICD-10-CM

## 2024-02-28 DIAGNOSIS — I25.2 OLD MYOCARDIAL INFARCTION: ICD-10-CM

## 2024-02-28 DIAGNOSIS — Z85.520 PERSONAL HISTORY OF MALIGNANT CARCINOID TUMOR OF KIDNEY: ICD-10-CM

## 2024-02-28 DIAGNOSIS — M54.9 DORSALGIA, UNSPECIFIED: ICD-10-CM

## 2024-02-28 DIAGNOSIS — I25.110 ATHEROSCLEROTIC HEART DISEASE OF NATIVE CORONARY ARTERY WITH UNSTABLE ANGINA PECTORIS: ICD-10-CM

## 2024-02-28 DIAGNOSIS — N40.0 BENIGN PROSTATIC HYPERPLASIA WITHOUT LOWER URINARY TRACT SYMPTOMS: ICD-10-CM

## 2024-02-28 DIAGNOSIS — K21.9 GASTRO-ESOPHAGEAL REFLUX DISEASE WITHOUT ESOPHAGITIS: ICD-10-CM

## 2024-02-28 DIAGNOSIS — I16.0 HYPERTENSIVE URGENCY: ICD-10-CM

## 2024-02-28 DIAGNOSIS — G89.29 OTHER CHRONIC PAIN: ICD-10-CM

## 2024-03-30 NOTE — DISCHARGE NOTE PROVIDER - DISCHARGE SERVICE FOR PATIENT
No on the discharge service for the patient. I have reviewed and made amendments to the documentation where necessary. Female

## 2024-05-03 ENCOUNTER — APPOINTMENT (OUTPATIENT)
Dept: ORTHOPEDIC SURGERY | Facility: CLINIC | Age: 74
End: 2024-05-03
Payer: MEDICARE

## 2024-05-03 PROCEDURE — 99213 OFFICE O/P EST LOW 20 MIN: CPT

## 2024-05-03 NOTE — HISTORY OF PRESENT ILLNESS
[de-identified] : Patient is a 73-year-old male here for reevaluation right knee osteoarthritis.  Patient last physical septation injection was about 6 months ago.  Patient states that he had mild relief.  Denies recent injury/trauma

## 2024-05-03 NOTE — DISCUSSION/SUMMARY
[de-identified] : Discussed prior x-rays in detail of right knee showing osteoarthritis.  Plan is for repeat viscosupplementation injection, discussed other treatment options in detail patient.  Discussed open patient injections ordered for right knee.  Patient understands agrees with plan.

## 2024-05-03 NOTE — PHYSICAL EXAM
[Right] : right knee [5___] : hamstring 5[unfilled]/5 [] : light touch is intact throughout [FreeTextEntry9] : Pain to range of motion right knee.  No pain left knee. [TWNoteComboBox7] : flexion 125 degrees [de-identified] : extension 0 degrees

## 2024-05-14 ENCOUNTER — APPOINTMENT (OUTPATIENT)
Dept: UROLOGY | Facility: CLINIC | Age: 74
End: 2024-05-14
Payer: MEDICARE

## 2024-05-14 VITALS
OXYGEN SATURATION: 92 % | HEART RATE: 72 BPM | WEIGHT: 246 LBS | HEIGHT: 68 IN | RESPIRATION RATE: 18 BRPM | DIASTOLIC BLOOD PRESSURE: 85 MMHG | BODY MASS INDEX: 37.28 KG/M2 | TEMPERATURE: 97.9 F | SYSTOLIC BLOOD PRESSURE: 130 MMHG

## 2024-05-14 DIAGNOSIS — N13.8 BENIGN PROSTATIC HYPERPLASIA WITH LOWER URINARY TRACT SYMPMS: ICD-10-CM

## 2024-05-14 DIAGNOSIS — N40.1 BENIGN PROSTATIC HYPERPLASIA WITH LOWER URINARY TRACT SYMPMS: ICD-10-CM

## 2024-05-14 DIAGNOSIS — N34.2 OTHER URETHRITIS: ICD-10-CM

## 2024-05-14 DIAGNOSIS — D41.4 NEOPLASM OF UNCERTAIN BEHAVIOR OF BLADDER: ICD-10-CM

## 2024-05-14 DIAGNOSIS — N28.9 DISORDER OF KIDNEY AND URETER, UNSPECIFIED: ICD-10-CM

## 2024-05-14 LAB
BILIRUB UR QL STRIP: NORMAL
COLLECTION METHOD: NORMAL
GLUCOSE UR-MCNC: NORMAL
HCG UR QL: 0.2 EU/DL
HGB UR QL STRIP.AUTO: NORMAL
KETONES UR-MCNC: NORMAL
LEUKOCYTE ESTERASE UR QL STRIP: NORMAL
NITRITE UR QL STRIP: NORMAL
PH UR STRIP: 5.5
PROT UR STRIP-MCNC: NORMAL
SP GR UR STRIP: 1.02

## 2024-05-14 PROCEDURE — G2211 COMPLEX E/M VISIT ADD ON: CPT

## 2024-05-14 PROCEDURE — 99213 OFFICE O/P EST LOW 20 MIN: CPT

## 2024-05-14 RX ORDER — NAPROXEN 500 MG/1
500 TABLET ORAL
Qty: 60 | Refills: 0 | Status: DISCONTINUED | COMMUNITY
Start: 2023-03-27 | End: 2024-05-14

## 2024-05-14 RX ORDER — AMLODIPINE BESYLATE 5 MG/1
5 TABLET ORAL
Refills: 0 | Status: ACTIVE | COMMUNITY

## 2024-05-14 RX ORDER — VALSARTAN 40 MG/1
TABLET, COATED ORAL
Refills: 0 | Status: DISCONTINUED | COMMUNITY
End: 2024-05-14

## 2024-05-14 RX ORDER — VALSARTAN 320 MG/1
320 TABLET, COATED ORAL
Refills: 0 | Status: ACTIVE | COMMUNITY

## 2024-05-14 RX ORDER — HYLAN G-F 20 16MG/2ML
16 SYRINGE (ML) INTRAARTICULAR
Qty: 1 | Refills: 0 | Status: DISCONTINUED | OUTPATIENT
Start: 2023-03-27 | End: 2024-05-14

## 2024-05-14 RX ORDER — DOCUSATE SODIUM 100 MG/1
100 CAPSULE ORAL
Refills: 0 | Status: DISCONTINUED | COMMUNITY
Start: 2020-07-22 | End: 2024-05-14

## 2024-05-14 RX ORDER — DAPAGLIFLOZIN 10 MG/1
10 TABLET, FILM COATED ORAL
Refills: 0 | Status: ACTIVE | COMMUNITY

## 2024-05-14 RX ORDER — HYLAN G-F 20 16MG/2ML
16 SYRINGE (ML) INTRAARTICULAR
Qty: 1 | Refills: 0 | Status: DISCONTINUED | OUTPATIENT
Start: 2023-07-21 | End: 2024-05-14

## 2024-05-14 RX ORDER — CLOPIDOGREL BISULFATE 75 MG/1
75 TABLET, FILM COATED ORAL DAILY
Qty: 30 | Refills: 0 | Status: DISCONTINUED | COMMUNITY
Start: 2020-07-22 | End: 2024-05-14

## 2024-05-28 ENCOUNTER — APPOINTMENT (OUTPATIENT)
Dept: ORTHOPEDIC SURGERY | Facility: CLINIC | Age: 74
End: 2024-05-28

## 2024-05-28 ENCOUNTER — APPOINTMENT (OUTPATIENT)
Dept: ORTHOPEDIC SURGERY | Facility: CLINIC | Age: 74
End: 2024-05-28
Payer: MEDICARE

## 2024-05-28 PROCEDURE — 99213 OFFICE O/P EST LOW 20 MIN: CPT | Mod: 25

## 2024-05-28 PROCEDURE — 20610 DRAIN/INJ JOINT/BURSA W/O US: CPT | Mod: RT

## 2024-05-28 NOTE — PHYSICAL EXAM
[Right] : right knee [5___] : hamstring 5[unfilled]/5 [] : light touch is intact throughout [FreeTextEntry9] : Pain to range of motion right knee.  No pain left knee. [TWNoteComboBox7] : flexion 125 degrees [de-identified] : extension 0 degrees

## 2024-05-28 NOTE — HISTORY OF PRESENT ILLNESS
[de-identified] : Patient is a 73-year-old male here for reevaluation right knee osteoarthritis.  Patient is here for Synvisc injection right knee.  Denies change in symptoms

## 2024-05-28 NOTE — DISCUSSION/SUMMARY
[de-identified] : An injection of Synvisc was injected into right knee after verbal consent using sterile technique. The risks, benefits, and alternatives to Visco-supplementation injection were explained in full to the patient. Risks outlined include but are not limited to infection, sepsis, bleeding, scarring, skin discoloration, temporary increase in pain, syncopal episode, failure to resolve symptoms, allergic reaction, and symptom recurrence. Signs and symptoms of infection reviewed, and patients advised to call immediately for redness, fevers, and/or chills. Patient understood the risks. All questions were answered. Sterile technique was used without complications. The patient tolerated the procedure well. Ice tonight to the injection site.        We will follow-up in 1 week for second injection.  Call if any questions or concerns.  Patient understands agrees with plan.

## 2024-06-04 ENCOUNTER — APPOINTMENT (OUTPATIENT)
Dept: ORTHOPEDIC SURGERY | Facility: CLINIC | Age: 74
End: 2024-06-04
Payer: MEDICARE

## 2024-06-04 PROCEDURE — 20610 DRAIN/INJ JOINT/BURSA W/O US: CPT | Mod: RT

## 2024-06-04 NOTE — DISCUSSION/SUMMARY
[de-identified] : An injection of Synvisc was injected into right knee after verbal consent using sterile technique. The risks, benefits, and alternatives to Visco-supplementation injection were explained in full to the patient. Risks outlined include but are not limited to infection, sepsis, bleeding, scarring, skin discoloration, temporary increase in pain, syncopal episode, failure to resolve symptoms, allergic reaction, and symptom recurrence. Signs and symptoms of infection reviewed, and patients advised to call immediately for redness, fevers, and/or chills. Patient understood the risks. All questions were answered. Sterile technique was used without complications. The patient tolerated the procedure well. Ice tonight to the injection site.        We will follow-up in 1 week for third injection.  Call if any questions or concerns.  Patient understands agrees with plan.

## 2024-06-04 NOTE — HISTORY OF PRESENT ILLNESS
[de-identified] : Patient is a 73-year-old male here for reevaluation right knee osteoarthritis.  Patient is here for Synvisc injection right knee.  Denies change in symptoms

## 2024-06-11 ENCOUNTER — APPOINTMENT (OUTPATIENT)
Dept: ORTHOPEDIC SURGERY | Facility: CLINIC | Age: 74
End: 2024-06-11
Payer: MEDICARE

## 2024-06-11 DIAGNOSIS — M17.11 UNILATERAL PRIMARY OSTEOARTHRITIS, RIGHT KNEE: ICD-10-CM

## 2024-06-11 PROCEDURE — 20610 DRAIN/INJ JOINT/BURSA W/O US: CPT | Mod: RT

## 2024-06-11 NOTE — HISTORY OF PRESENT ILLNESS
[de-identified] : Patient is a 73-year-old male here for reevaluation right knee osteoarthritis.  Patient is here for Synvisc injection right knee.  Denies change in symptoms

## 2024-06-11 NOTE — DISCUSSION/SUMMARY
[de-identified] : An injection of Synvisc was injected into right knee after verbal consent using sterile technique. The risks, benefits, and alternatives to Visco-supplementation injection were explained in full to the patient. Risks outlined include but are not limited to infection, sepsis, bleeding, scarring, skin discoloration, temporary increase in pain, syncopal episode, failure to resolve symptoms, allergic reaction, and symptom recurrence. Signs and symptoms of infection reviewed, and patients advised to call immediately for redness, fevers, and/or chills. Patient understood the risks. All questions were answered. Sterile technique was used without complications. The patient tolerated the procedure well. Ice tonight to the injection site.    Follow-up in 4 to 6 months

## 2025-06-17 NOTE — ED PROVIDER NOTE - WR ORDER ID 1
BCNN visited patient in the preoperative setting. Patients son, Ramana, joined at the bedside. Comfort pillow given to patient. Time spent listening, answering questions, and providing support to patient and friends/family.   
[id number]

## 2025-06-24 ENCOUNTER — APPOINTMENT (OUTPATIENT)
Dept: UROLOGY | Facility: CLINIC | Age: 75
End: 2025-06-24
Payer: MEDICARE

## 2025-06-24 VITALS — BODY MASS INDEX: 37.28 KG/M2 | WEIGHT: 246 LBS | HEIGHT: 68 IN

## 2025-06-24 LAB
BILIRUB UR QL STRIP: NORMAL
COLLECTION METHOD: NORMAL
GLUCOSE UR-MCNC: NORMAL
HCG UR QL: NORMAL EU/DL
HGB UR QL STRIP.AUTO: NORMAL
KETONES UR-MCNC: NORMAL
LEUKOCYTE ESTERASE UR QL STRIP: NORMAL
NITRITE UR QL STRIP: NORMAL
PH UR STRIP: 5.5
PROT UR STRIP-MCNC: NORMAL
SP GR UR STRIP: 1.01

## 2025-06-24 PROCEDURE — G2211 COMPLEX E/M VISIT ADD ON: CPT

## 2025-06-24 PROCEDURE — 99213 OFFICE O/P EST LOW 20 MIN: CPT

## 2025-06-30 NOTE — ED CDU PROVIDER INITIAL DAY NOTE - NSICDXPASTMEDICALHX_GEN_ALL_CORE_FT
PAST MEDICAL HISTORY:  Back pain     GERD (gastroesophageal reflux disease)     Gout     HTN (hypertension)     Renal cancer, left Renal cell carcinoma s/p partial left nephrectomy (20% removed)    
No